# Patient Record
Sex: FEMALE | Race: BLACK OR AFRICAN AMERICAN | Employment: FULL TIME | URBAN - METROPOLITAN AREA
[De-identification: names, ages, dates, MRNs, and addresses within clinical notes are randomized per-mention and may not be internally consistent; named-entity substitution may affect disease eponyms.]

---

## 2017-01-19 ENCOUNTER — OFFICE VISIT (OUTPATIENT)
Dept: INTERNAL MEDICINE CLINIC | Age: 52
End: 2017-01-19

## 2017-01-19 VITALS
DIASTOLIC BLOOD PRESSURE: 78 MMHG | SYSTOLIC BLOOD PRESSURE: 126 MMHG | WEIGHT: 280 LBS | RESPIRATION RATE: 14 BRPM | HEART RATE: 80 BPM | BODY MASS INDEX: 51.21 KG/M2

## 2017-01-19 DIAGNOSIS — G47.33 OSA (OBSTRUCTIVE SLEEP APNEA): ICD-10-CM

## 2017-01-19 DIAGNOSIS — R80.9 TYPE 2 DIABETES MELLITUS WITH MICROALBUMINURIA, WITHOUT LONG-TERM CURRENT USE OF INSULIN (HCC): ICD-10-CM

## 2017-01-19 DIAGNOSIS — E78.5 HYPERLIPIDEMIA, UNSPECIFIED HYPERLIPIDEMIA TYPE: ICD-10-CM

## 2017-01-19 DIAGNOSIS — I10 ESSENTIAL HYPERTENSION, BENIGN: ICD-10-CM

## 2017-01-19 DIAGNOSIS — J45.20 MILD INTERMITTENT ASTHMA WITHOUT COMPLICATION: ICD-10-CM

## 2017-01-19 DIAGNOSIS — E11.29 TYPE 2 DIABETES MELLITUS WITH MICROALBUMINURIA, WITHOUT LONG-TERM CURRENT USE OF INSULIN (HCC): ICD-10-CM

## 2017-01-19 DIAGNOSIS — E66.01 MORBID OBESITY WITH BMI OF 50.0-59.9, ADULT (HCC): ICD-10-CM

## 2017-01-19 DIAGNOSIS — E11.29 TYPE 2 DIABETES MELLITUS WITH MICROALBUMINURIA, WITHOUT LONG-TERM CURRENT USE OF INSULIN (HCC): Primary | ICD-10-CM

## 2017-01-19 DIAGNOSIS — Z12.11 COLON CANCER SCREENING: ICD-10-CM

## 2017-01-19 DIAGNOSIS — J00 NASOPHARYNGITIS: ICD-10-CM

## 2017-01-19 DIAGNOSIS — R80.9 TYPE 2 DIABETES MELLITUS WITH MICROALBUMINURIA, WITHOUT LONG-TERM CURRENT USE OF INSULIN (HCC): Primary | ICD-10-CM

## 2017-01-19 LAB
A/G RATIO: 1.2 (ref 1.1–2.2)
ALBUMIN SERPL-MCNC: 4.6 G/DL (ref 3.4–5)
ALP BLD-CCNC: 68 U/L (ref 40–129)
ALT SERPL-CCNC: 12 U/L (ref 10–40)
ANION GAP SERPL CALCULATED.3IONS-SCNC: 17 MMOL/L (ref 3–16)
AST SERPL-CCNC: 12 U/L (ref 15–37)
BASOPHILS ABSOLUTE: 0 K/UL (ref 0–0.2)
BASOPHILS RELATIVE PERCENT: 0.4 %
BILIRUB SERPL-MCNC: <0.2 MG/DL (ref 0–1)
BUN BLDV-MCNC: 12 MG/DL (ref 7–20)
CALCIUM SERPL-MCNC: 9.7 MG/DL (ref 8.3–10.6)
CHLORIDE BLD-SCNC: 96 MMOL/L (ref 99–110)
CO2: 26 MMOL/L (ref 21–32)
CREAT SERPL-MCNC: 0.8 MG/DL (ref 0.6–1.1)
EOSINOPHILS ABSOLUTE: 0.1 K/UL (ref 0–0.6)
EOSINOPHILS RELATIVE PERCENT: 0.9 %
GFR AFRICAN AMERICAN: >60
GFR NON-AFRICAN AMERICAN: >60
GLOBULIN: 3.8 G/DL
GLUCOSE BLD-MCNC: 130 MG/DL (ref 70–99)
HCT VFR BLD CALC: 32.7 % (ref 36–48)
HEMOGLOBIN: 10.9 G/DL (ref 12–16)
LYMPHOCYTES ABSOLUTE: 2.7 K/UL (ref 1–5.1)
LYMPHOCYTES RELATIVE PERCENT: 29.8 %
MCH RBC QN AUTO: 26.1 PG (ref 26–34)
MCHC RBC AUTO-ENTMCNC: 33.3 G/DL (ref 31–36)
MCV RBC AUTO: 78.2 FL (ref 80–100)
MONOCYTES ABSOLUTE: 0.4 K/UL (ref 0–1.3)
MONOCYTES RELATIVE PERCENT: 4.5 %
NEUTROPHILS ABSOLUTE: 5.7 K/UL (ref 1.7–7.7)
NEUTROPHILS RELATIVE PERCENT: 64.4 %
PDW BLD-RTO: 15.9 % (ref 12.4–15.4)
PLATELET # BLD: 389 K/UL (ref 135–450)
PMV BLD AUTO: 8.7 FL (ref 5–10.5)
POTASSIUM SERPL-SCNC: 4.5 MMOL/L (ref 3.5–5.1)
RBC # BLD: 4.18 M/UL (ref 4–5.2)
SODIUM BLD-SCNC: 139 MMOL/L (ref 136–145)
TOTAL PROTEIN: 8.4 G/DL (ref 6.4–8.2)
WBC # BLD: 8.9 K/UL (ref 4–11)

## 2017-01-19 PROCEDURE — 99214 OFFICE O/P EST MOD 30 MIN: CPT | Performed by: INTERNAL MEDICINE

## 2017-01-19 RX ORDER — FLUTICASONE PROPIONATE 50 MCG
1 SPRAY, SUSPENSION (ML) NASAL DAILY
Qty: 1 BOTTLE | Refills: 3 | Status: SHIPPED | OUTPATIENT
Start: 2017-01-19 | End: 2017-09-21 | Stop reason: SDUPTHER

## 2017-01-19 RX ORDER — AMLODIPINE BESYLATE 10 MG/1
TABLET ORAL
Qty: 30 TABLET | Refills: 5 | Status: SHIPPED | OUTPATIENT
Start: 2017-01-19 | End: 2017-02-22

## 2017-01-19 RX ORDER — FEXOFENADINE HCL 180 MG/1
180 TABLET ORAL DAILY
Qty: 30 TABLET | Refills: 5 | Status: SHIPPED | OUTPATIENT
Start: 2017-01-19 | End: 2017-09-21 | Stop reason: SDUPTHER

## 2017-01-19 RX ORDER — SIMVASTATIN 20 MG
20 TABLET ORAL DAILY
COMMUNITY
Start: 2017-01-16 | End: 2017-02-22

## 2017-01-19 RX ORDER — VALSARTAN AND HYDROCHLOROTHIAZIDE 320; 25 MG/1; MG/1
1 TABLET, FILM COATED ORAL DAILY
Qty: 30 TABLET | Refills: 5 | Status: SHIPPED | OUTPATIENT
Start: 2017-01-19 | End: 2017-09-21 | Stop reason: SDUPTHER

## 2017-01-20 LAB
ESTIMATED AVERAGE GLUCOSE: 159.9 MG/DL
HBA1C MFR BLD: 7.2 %

## 2017-01-26 ENCOUNTER — TELEPHONE (OUTPATIENT)
Dept: BARIATRICS/WEIGHT MGMT | Age: 52
End: 2017-01-26

## 2017-02-06 ENCOUNTER — OFFICE VISIT (OUTPATIENT)
Dept: INTERNAL MEDICINE CLINIC | Age: 52
End: 2017-02-06

## 2017-02-06 VITALS
BODY MASS INDEX: 51.58 KG/M2 | TEMPERATURE: 98.2 F | WEIGHT: 282 LBS | DIASTOLIC BLOOD PRESSURE: 84 MMHG | SYSTOLIC BLOOD PRESSURE: 136 MMHG

## 2017-02-06 DIAGNOSIS — R80.9 TYPE 2 DIABETES MELLITUS WITH MICROALBUMINURIA, WITHOUT LONG-TERM CURRENT USE OF INSULIN (HCC): ICD-10-CM

## 2017-02-06 DIAGNOSIS — E11.29 TYPE 2 DIABETES MELLITUS WITH MICROALBUMINURIA, WITHOUT LONG-TERM CURRENT USE OF INSULIN (HCC): ICD-10-CM

## 2017-02-06 DIAGNOSIS — J02.9 EXUDATIVE PHARYNGITIS: Primary | ICD-10-CM

## 2017-02-06 DIAGNOSIS — J02.9 EXUDATIVE PHARYNGITIS: ICD-10-CM

## 2017-02-06 LAB
BASOPHILS ABSOLUTE: 0.1 K/UL (ref 0–0.2)
BASOPHILS RELATIVE PERCENT: 1 %
EOSINOPHILS ABSOLUTE: 0.1 K/UL (ref 0–0.6)
EOSINOPHILS RELATIVE PERCENT: 2.2 %
HCT VFR BLD CALC: 30.7 % (ref 36–48)
HEMOGLOBIN: 9.9 G/DL (ref 12–16)
LYMPHOCYTES ABSOLUTE: 2.4 K/UL (ref 1–5.1)
LYMPHOCYTES RELATIVE PERCENT: 36.9 %
MCH RBC QN AUTO: 24.7 PG (ref 26–34)
MCHC RBC AUTO-ENTMCNC: 32.2 G/DL (ref 31–36)
MCV RBC AUTO: 76.7 FL (ref 80–100)
MONOCYTES ABSOLUTE: 0.4 K/UL (ref 0–1.3)
MONOCYTES RELATIVE PERCENT: 6.8 %
NEUTROPHILS ABSOLUTE: 3.5 K/UL (ref 1.7–7.7)
NEUTROPHILS RELATIVE PERCENT: 53.1 %
PDW BLD-RTO: 15.6 % (ref 12.4–15.4)
PLATELET # BLD: 334 K/UL (ref 135–450)
PMV BLD AUTO: 8.7 FL (ref 5–10.5)
RBC # BLD: 4 M/UL (ref 4–5.2)
WBC # BLD: 6.5 K/UL (ref 4–11)

## 2017-02-06 PROCEDURE — 99213 OFFICE O/P EST LOW 20 MIN: CPT | Performed by: INTERNAL MEDICINE

## 2017-02-06 PROCEDURE — 87880 STREP A ASSAY W/OPTIC: CPT | Performed by: INTERNAL MEDICINE

## 2017-02-06 RX ORDER — PENICILLIN V POTASSIUM 500 MG/1
500 TABLET ORAL 2 TIMES DAILY
Qty: 20 TABLET | Refills: 0 | Status: SHIPPED | OUTPATIENT
Start: 2017-02-06 | End: 2017-02-16

## 2017-02-07 LAB
ANION GAP SERPL CALCULATED.3IONS-SCNC: 15 MMOL/L (ref 3–16)
BUN BLDV-MCNC: 10 MG/DL (ref 7–20)
CALCIUM SERPL-MCNC: 9.2 MG/DL (ref 8.3–10.6)
CHLORIDE BLD-SCNC: 100 MMOL/L (ref 99–110)
CO2: 26 MMOL/L (ref 21–32)
CREAT SERPL-MCNC: 0.7 MG/DL (ref 0.6–1.1)
GFR AFRICAN AMERICAN: >60
GFR NON-AFRICAN AMERICAN: >60
GLUCOSE BLD-MCNC: 119 MG/DL (ref 70–99)
POTASSIUM SERPL-SCNC: 4.2 MMOL/L (ref 3.5–5.1)
S PYO AG THROAT QL: NORMAL
SODIUM BLD-SCNC: 141 MMOL/L (ref 136–145)

## 2017-02-08 LAB
ORGANISM: ABNORMAL
THROAT CULTURE: ABNORMAL
THROAT CULTURE: ABNORMAL

## 2017-02-21 DIAGNOSIS — E78.5 HYPERLIPIDEMIA: ICD-10-CM

## 2017-02-21 DIAGNOSIS — E11.9 TYPE 2 DIABETES MELLITUS WITHOUT COMPLICATION (HCC): ICD-10-CM

## 2017-02-21 DIAGNOSIS — I10 ESSENTIAL HYPERTENSION, BENIGN: ICD-10-CM

## 2017-02-22 RX ORDER — ASPIRIN 81 MG/1
TABLET, CHEWABLE ORAL
Qty: 30 TABLET | Refills: 10 | Status: SHIPPED | OUTPATIENT
Start: 2017-02-22 | End: 2018-03-06 | Stop reason: SDUPTHER

## 2017-02-22 RX ORDER — AMLODIPINE BESYLATE 10 MG/1
TABLET ORAL
Qty: 30 TABLET | Refills: 4 | Status: SHIPPED | OUTPATIENT
Start: 2017-02-22 | End: 2017-09-21 | Stop reason: SDUPTHER

## 2017-02-22 RX ORDER — SIMVASTATIN 20 MG
TABLET ORAL
Qty: 30 TABLET | Refills: 10 | Status: SHIPPED | OUTPATIENT
Start: 2017-02-22 | End: 2018-03-06 | Stop reason: SDUPTHER

## 2017-03-02 ENCOUNTER — OFFICE VISIT (OUTPATIENT)
Dept: GYNECOLOGY | Age: 52
End: 2017-03-02

## 2017-03-02 VITALS
TEMPERATURE: 96.1 F | SYSTOLIC BLOOD PRESSURE: 166 MMHG | HEIGHT: 62 IN | RESPIRATION RATE: 17 BRPM | HEART RATE: 62 BPM | WEIGHT: 287 LBS | DIASTOLIC BLOOD PRESSURE: 103 MMHG | BODY MASS INDEX: 52.81 KG/M2

## 2017-03-02 DIAGNOSIS — D25.1 INTRAMURAL LEIOMYOMA OF UTERUS: Primary | ICD-10-CM

## 2017-03-02 DIAGNOSIS — D50.0 IRON DEFICIENCY ANEMIA DUE TO CHRONIC BLOOD LOSS: ICD-10-CM

## 2017-03-02 PROCEDURE — 99214 OFFICE O/P EST MOD 30 MIN: CPT | Performed by: OBSTETRICS & GYNECOLOGY

## 2017-03-05 ASSESSMENT — ENCOUNTER SYMPTOMS
EYES NEGATIVE: 1
GASTROINTESTINAL NEGATIVE: 1
RESPIRATORY NEGATIVE: 1

## 2017-03-09 ENCOUNTER — HOSPITAL ENCOUNTER (OUTPATIENT)
Dept: ULTRASOUND IMAGING | Age: 52
Discharge: HOME | End: 2017-03-09
Attending: OBSTETRICS & GYNECOLOGY | Admitting: OBSTETRICS & GYNECOLOGY

## 2017-03-09 DIAGNOSIS — D25.1 INTRAMURAL LEIOMYOMA OF UTERUS: ICD-10-CM

## 2017-03-09 DIAGNOSIS — D50.0 IRON DEFICIENCY ANEMIA DUE TO CHRONIC BLOOD LOSS: ICD-10-CM

## 2017-03-16 ENCOUNTER — TELEPHONE (OUTPATIENT)
Dept: GYNECOLOGY | Age: 52
End: 2017-03-16

## 2017-04-05 PROBLEM — K63.5 COLON POLYPS: Status: ACTIVE | Noted: 2017-04-05

## 2017-04-12 ENCOUNTER — OFFICE VISIT (OUTPATIENT)
Dept: PULMONOLOGY | Age: 52
End: 2017-04-12

## 2017-04-12 VITALS
HEART RATE: 86 BPM | WEIGHT: 282 LBS | BODY MASS INDEX: 51.89 KG/M2 | TEMPERATURE: 98.2 F | SYSTOLIC BLOOD PRESSURE: 138 MMHG | OXYGEN SATURATION: 97 % | RESPIRATION RATE: 16 BRPM | DIASTOLIC BLOOD PRESSURE: 84 MMHG | HEIGHT: 62 IN

## 2017-04-12 DIAGNOSIS — G47.33 OSA TREATED WITH BIPAP: Primary | ICD-10-CM

## 2017-04-12 PROCEDURE — 99213 OFFICE O/P EST LOW 20 MIN: CPT | Performed by: INTERNAL MEDICINE

## 2017-04-12 RX ORDER — M-VIT,TX,IRON,MINS/CALC/FOLIC 27MG-0.4MG
1 TABLET ORAL DAILY
Status: ON HOLD | COMMUNITY
End: 2020-11-06 | Stop reason: CLARIF

## 2017-04-12 ASSESSMENT — SLEEP AND FATIGUE QUESTIONNAIRES
HOW LIKELY ARE YOU TO NOD OFF OR FALL ASLEEP WHILE WATCHING TV: 1
ESS TOTAL SCORE: 8
HOW LIKELY ARE YOU TO NOD OFF OR FALL ASLEEP WHILE SITTING AND READING: 1
HOW LIKELY ARE YOU TO NOD OFF OR FALL ASLEEP WHILE SITTING AND TALKING TO SOMEONE: 1
HOW LIKELY ARE YOU TO NOD OFF OR FALL ASLEEP IN A CAR, WHILE STOPPED FOR A FEW MINUTES IN TRAFFIC: 1
HOW LIKELY ARE YOU TO NOD OFF OR FALL ASLEEP WHEN YOU ARE A PASSENGER IN A CAR FOR AN HOUR WITHOUT A BREAK: 1
HOW LIKELY ARE YOU TO NOD OFF OR FALL ASLEEP WHILE SITTING INACTIVE IN A PUBLIC PLACE: 1
HOW LIKELY ARE YOU TO NOD OFF OR FALL ASLEEP WHILE LYING DOWN TO REST IN THE AFTERNOON WHEN CIRCUMSTANCES PERMIT: 1
HOW LIKELY ARE YOU TO NOD OFF OR FALL ASLEEP WHILE SITTING QUIETLY AFTER LUNCH WITHOUT ALCOHOL: 1

## 2017-04-19 ENCOUNTER — OFFICE VISIT (OUTPATIENT)
Dept: INTERNAL MEDICINE CLINIC | Age: 52
End: 2017-04-19

## 2017-04-19 VITALS
HEART RATE: 63 BPM | OXYGEN SATURATION: 95 % | SYSTOLIC BLOOD PRESSURE: 126 MMHG | BODY MASS INDEX: 51.94 KG/M2 | WEIGHT: 284 LBS | DIASTOLIC BLOOD PRESSURE: 70 MMHG

## 2017-04-19 DIAGNOSIS — E78.5 HYPERLIPIDEMIA, UNSPECIFIED HYPERLIPIDEMIA TYPE: ICD-10-CM

## 2017-04-19 DIAGNOSIS — R80.9 TYPE 2 DIABETES MELLITUS WITH MICROALBUMINURIA, WITHOUT LONG-TERM CURRENT USE OF INSULIN (HCC): Primary | ICD-10-CM

## 2017-04-19 DIAGNOSIS — I10 ESSENTIAL HYPERTENSION, BENIGN: ICD-10-CM

## 2017-04-19 DIAGNOSIS — D50.0 IRON DEFICIENCY ANEMIA DUE TO CHRONIC BLOOD LOSS: ICD-10-CM

## 2017-04-19 DIAGNOSIS — E11.29 TYPE 2 DIABETES MELLITUS WITH MICROALBUMINURIA, WITHOUT LONG-TERM CURRENT USE OF INSULIN (HCC): Primary | ICD-10-CM

## 2017-04-19 LAB — HBA1C MFR BLD: 7.2 %

## 2017-04-19 PROCEDURE — 83036 HEMOGLOBIN GLYCOSYLATED A1C: CPT | Performed by: INTERNAL MEDICINE

## 2017-04-19 PROCEDURE — 99214 OFFICE O/P EST MOD 30 MIN: CPT | Performed by: INTERNAL MEDICINE

## 2017-04-26 ENCOUNTER — TELEPHONE (OUTPATIENT)
Dept: GYNECOLOGY | Age: 52
End: 2017-04-26

## 2017-05-10 DIAGNOSIS — E11.9 TYPE 2 DIABETES MELLITUS WITHOUT COMPLICATION, UNSPECIFIED LONG TERM INSULIN USE STATUS: ICD-10-CM

## 2017-05-10 DIAGNOSIS — E87.6 HYPOKALEMIA: ICD-10-CM

## 2017-05-10 RX ORDER — POTASSIUM CHLORIDE 1500 MG/1
TABLET, EXTENDED RELEASE ORAL
Qty: 30 TABLET | Refills: 0 | Status: SHIPPED | OUTPATIENT
Start: 2017-05-10 | End: 2017-06-05 | Stop reason: SDUPTHER

## 2017-05-10 RX ORDER — GLIMEPIRIDE 4 MG/1
TABLET ORAL
Qty: 30 TABLET | Refills: 0 | Status: SHIPPED | OUTPATIENT
Start: 2017-05-10 | End: 2017-06-11 | Stop reason: SDUPTHER

## 2017-05-30 ENCOUNTER — TELEPHONE (OUTPATIENT)
Dept: GYNECOLOGY | Age: 52
End: 2017-05-30

## 2017-06-01 ENCOUNTER — OFFICE VISIT (OUTPATIENT)
Dept: INTERNAL MEDICINE CLINIC | Age: 52
End: 2017-06-01

## 2017-06-01 VITALS
SYSTOLIC BLOOD PRESSURE: 138 MMHG | BODY MASS INDEX: 51.58 KG/M2 | WEIGHT: 282 LBS | RESPIRATION RATE: 14 BRPM | DIASTOLIC BLOOD PRESSURE: 74 MMHG | HEART RATE: 60 BPM

## 2017-06-01 DIAGNOSIS — I10 ESSENTIAL HYPERTENSION, BENIGN: ICD-10-CM

## 2017-06-01 DIAGNOSIS — G47.33 OSA (OBSTRUCTIVE SLEEP APNEA): ICD-10-CM

## 2017-06-01 DIAGNOSIS — E11.29 TYPE 2 DIABETES MELLITUS WITH MICROALBUMINURIA, WITHOUT LONG-TERM CURRENT USE OF INSULIN (HCC): ICD-10-CM

## 2017-06-01 DIAGNOSIS — R06.09 DOE (DYSPNEA ON EXERTION): ICD-10-CM

## 2017-06-01 DIAGNOSIS — D50.0 IRON DEFICIENCY ANEMIA DUE TO CHRONIC BLOOD LOSS: ICD-10-CM

## 2017-06-01 DIAGNOSIS — Z01.818 PREOP GENERAL PHYSICAL EXAM: Primary | ICD-10-CM

## 2017-06-01 DIAGNOSIS — E66.01 MORBID OBESITY WITH BMI OF 50.0-59.9, ADULT (HCC): ICD-10-CM

## 2017-06-01 DIAGNOSIS — D25.1 FIBROIDS, INTRAMURAL: ICD-10-CM

## 2017-06-01 DIAGNOSIS — R80.9 TYPE 2 DIABETES MELLITUS WITH MICROALBUMINURIA, WITHOUT LONG-TERM CURRENT USE OF INSULIN (HCC): ICD-10-CM

## 2017-06-01 DIAGNOSIS — J98.4 RESTRICTIVE LUNG DISEASE: ICD-10-CM

## 2017-06-01 DIAGNOSIS — E78.5 HYPERLIPIDEMIA, UNSPECIFIED HYPERLIPIDEMIA TYPE: ICD-10-CM

## 2017-06-01 PROCEDURE — 93000 ELECTROCARDIOGRAM COMPLETE: CPT | Performed by: INTERNAL MEDICINE

## 2017-06-01 PROCEDURE — 99244 OFF/OP CNSLTJ NEW/EST MOD 40: CPT | Performed by: INTERNAL MEDICINE

## 2017-06-02 LAB
A/G RATIO: 1.2 (ref 1.1–2.2)
ALBUMIN SERPL-MCNC: 4.3 G/DL (ref 3.4–5)
ALP BLD-CCNC: 60 U/L (ref 40–129)
ALT SERPL-CCNC: 14 U/L (ref 10–40)
ANION GAP SERPL CALCULATED.3IONS-SCNC: 14 MMOL/L (ref 3–16)
AST SERPL-CCNC: 14 U/L (ref 15–37)
BASOPHILS ABSOLUTE: 0 K/UL (ref 0–0.2)
BASOPHILS RELATIVE PERCENT: 0.6 %
BILIRUB SERPL-MCNC: <0.2 MG/DL (ref 0–1)
BUN BLDV-MCNC: 11 MG/DL (ref 7–20)
CALCIUM SERPL-MCNC: 9 MG/DL (ref 8.3–10.6)
CHLORIDE BLD-SCNC: 99 MMOL/L (ref 99–110)
CHOLESTEROL, TOTAL: 188 MG/DL (ref 0–199)
CO2: 26 MMOL/L (ref 21–32)
CREAT SERPL-MCNC: 0.7 MG/DL (ref 0.6–1.1)
EOSINOPHILS ABSOLUTE: 0.1 K/UL (ref 0–0.6)
EOSINOPHILS RELATIVE PERCENT: 1.4 %
ESTIMATED AVERAGE GLUCOSE: 171.4 MG/DL
FOLATE: 13.6 NG/ML (ref 4.78–24.2)
GFR AFRICAN AMERICAN: >60
GFR NON-AFRICAN AMERICAN: >60
GLOBULIN: 3.7 G/DL
GLUCOSE BLD-MCNC: 144 MG/DL (ref 70–99)
HBA1C MFR BLD: 7.6 %
HCT VFR BLD CALC: 34.3 % (ref 36–48)
HDLC SERPL-MCNC: 45 MG/DL (ref 40–60)
HEMOGLOBIN: 11 G/DL (ref 12–16)
LDL CHOLESTEROL CALCULATED: 126 MG/DL
LYMPHOCYTES ABSOLUTE: 2.3 K/UL (ref 1–5.1)
LYMPHOCYTES RELATIVE PERCENT: 34.3 %
MCH RBC QN AUTO: 25 PG (ref 26–34)
MCHC RBC AUTO-ENTMCNC: 32.2 G/DL (ref 31–36)
MCV RBC AUTO: 77.6 FL (ref 80–100)
MONOCYTES ABSOLUTE: 0.3 K/UL (ref 0–1.3)
MONOCYTES RELATIVE PERCENT: 5.2 %
NEUTROPHILS ABSOLUTE: 3.9 K/UL (ref 1.7–7.7)
NEUTROPHILS RELATIVE PERCENT: 58.5 %
PDW BLD-RTO: 17.4 % (ref 12.4–15.4)
PLATELET # BLD: 267 K/UL (ref 135–450)
PMV BLD AUTO: 8.8 FL (ref 5–10.5)
POTASSIUM SERPL-SCNC: 4 MMOL/L (ref 3.5–5.1)
RBC # BLD: 4.41 M/UL (ref 4–5.2)
SODIUM BLD-SCNC: 139 MMOL/L (ref 136–145)
TOTAL PROTEIN: 8 G/DL (ref 6.4–8.2)
TRIGL SERPL-MCNC: 87 MG/DL (ref 0–150)
VITAMIN B-12: 304 PG/ML (ref 211–911)
VLDLC SERPL CALC-MCNC: 17 MG/DL
WBC # BLD: 6.6 K/UL (ref 4–11)

## 2017-06-05 ENCOUNTER — TELEPHONE (OUTPATIENT)
Dept: INTERNAL MEDICINE CLINIC | Age: 52
End: 2017-06-05

## 2017-06-05 DIAGNOSIS — E87.6 HYPOKALEMIA: ICD-10-CM

## 2017-06-05 RX ORDER — POTASSIUM CHLORIDE 1500 MG/1
TABLET, EXTENDED RELEASE ORAL
Qty: 30 TABLET | Refills: 0 | Status: SHIPPED | OUTPATIENT
Start: 2017-06-05 | End: 2017-07-04 | Stop reason: SDUPTHER

## 2017-06-07 ENCOUNTER — HOSPITAL ENCOUNTER (OUTPATIENT)
Dept: NON INVASIVE DIAGNOSTICS | Age: 52
Discharge: HOME | End: 2017-06-07
Attending: INTERNAL MEDICINE | Admitting: INTERNAL MEDICINE

## 2017-06-07 DIAGNOSIS — Z01.818 ENCOUNTER FOR OTHER PREPROCEDURAL EXAMINATION: ICD-10-CM

## 2017-06-07 DIAGNOSIS — Z01.818 PREOP GENERAL PHYSICAL EXAM: ICD-10-CM

## 2017-06-07 DIAGNOSIS — R06.09 DOE (DYSPNEA ON EXERTION): ICD-10-CM

## 2017-06-07 DIAGNOSIS — I10 ESSENTIAL HYPERTENSION: ICD-10-CM

## 2017-06-07 LAB
LV EF: 68 %
LVEF MODALITY: NORMAL

## 2017-06-08 ENCOUNTER — OFFICE VISIT (OUTPATIENT)
Dept: CARDIOLOGY CLINIC | Age: 52
End: 2017-06-08

## 2017-06-08 ENCOUNTER — TELEPHONE (OUTPATIENT)
Dept: CARDIOLOGY CLINIC | Age: 52
End: 2017-06-08

## 2017-06-08 ENCOUNTER — HOSPITAL ENCOUNTER (OUTPATIENT)
Dept: PREADMISSION TESTING | Age: 52
Discharge: HOME | End: 2017-06-08
Attending: OBSTETRICS & GYNECOLOGY | Admitting: OBSTETRICS & GYNECOLOGY

## 2017-06-08 VITALS — WEIGHT: 280 LBS | HEIGHT: 63 IN | BODY MASS INDEX: 49.61 KG/M2

## 2017-06-08 VITALS
WEIGHT: 282.6 LBS | DIASTOLIC BLOOD PRESSURE: 70 MMHG | BODY MASS INDEX: 50.07 KG/M2 | HEIGHT: 63 IN | HEART RATE: 64 BPM | SYSTOLIC BLOOD PRESSURE: 110 MMHG

## 2017-06-08 DIAGNOSIS — I10 ESSENTIAL HYPERTENSION, BENIGN: ICD-10-CM

## 2017-06-08 DIAGNOSIS — R80.9 TYPE 2 DIABETES MELLITUS WITH MICROALBUMINURIA, WITHOUT LONG-TERM CURRENT USE OF INSULIN (HCC): ICD-10-CM

## 2017-06-08 DIAGNOSIS — E66.01 MORBID OBESITY WITH BMI OF 50.0-59.9, ADULT (HCC): ICD-10-CM

## 2017-06-08 DIAGNOSIS — E11.29 TYPE 2 DIABETES MELLITUS WITH MICROALBUMINURIA, WITHOUT LONG-TERM CURRENT USE OF INSULIN (HCC): ICD-10-CM

## 2017-06-08 DIAGNOSIS — E78.5 HYPERLIPIDEMIA, UNSPECIFIED HYPERLIPIDEMIA TYPE: ICD-10-CM

## 2017-06-08 DIAGNOSIS — D25.1 FIBROIDS, INTRAMURAL: ICD-10-CM

## 2017-06-08 DIAGNOSIS — I10 ESSENTIAL HYPERTENSION, BENIGN: Primary | ICD-10-CM

## 2017-06-08 LAB
A/G RATIO: 1.1 (ref 1.1–2.2)
ALBUMIN SERPL-MCNC: 4.2 G/DL (ref 3.4–5)
ALP BLD-CCNC: 69 U/L (ref 40–129)
ALT SERPL-CCNC: 14 U/L (ref 10–40)
ANION GAP SERPL CALCULATED.3IONS-SCNC: 19 MMOL/L (ref 3–16)
AST SERPL-CCNC: 12 U/L (ref 15–37)
BASOPHILS ABSOLUTE: 0 K/UL (ref 0–0.2)
BASOPHILS RELATIVE PERCENT: 0.3 %
BILIRUB SERPL-MCNC: 0.3 MG/DL (ref 0–1)
BUN BLDV-MCNC: 15 MG/DL (ref 7–20)
CALCIUM SERPL-MCNC: 9.4 MG/DL (ref 8.3–10.6)
CHLORIDE BLD-SCNC: 100 MMOL/L (ref 99–110)
CO2: 25 MMOL/L (ref 21–32)
CREAT SERPL-MCNC: 0.7 MG/DL (ref 0.6–1.1)
EOSINOPHILS ABSOLUTE: 0.1 K/UL (ref 0–0.6)
EOSINOPHILS RELATIVE PERCENT: 0.8 %
GFR AFRICAN AMERICAN: >60
GFR NON-AFRICAN AMERICAN: >60
GLOBULIN: 3.8 G/DL
GLUCOSE BLD-MCNC: 102 MG/DL (ref 70–99)
HCT VFR BLD CALC: 35.2 % (ref 36–48)
HEMOGLOBIN: 11.6 G/DL (ref 12–16)
INR BLD: 1.11 (ref 0.85–1.15)
LYMPHOCYTES ABSOLUTE: 2.6 K/UL (ref 1–5.1)
LYMPHOCYTES RELATIVE PERCENT: 35 %
MCH RBC QN AUTO: 25.4 PG (ref 26–34)
MCHC RBC AUTO-ENTMCNC: 33 G/DL (ref 31–36)
MCV RBC AUTO: 76.9 FL (ref 80–100)
MONOCYTES ABSOLUTE: 0.4 K/UL (ref 0–1.3)
MONOCYTES RELATIVE PERCENT: 5.2 %
NEUTROPHILS ABSOLUTE: 4.4 K/UL (ref 1.7–7.7)
NEUTROPHILS RELATIVE PERCENT: 58.7 %
PDW BLD-RTO: 17.9 % (ref 12.4–15.4)
PLATELET # BLD: 298 K/UL (ref 135–450)
PMV BLD AUTO: 8.8 FL (ref 5–10.5)
POTASSIUM SERPL-SCNC: 4 MMOL/L (ref 3.5–5.1)
PROTHROMBIN TIME: 12.5 SEC (ref 9.6–13)
RBC # BLD: 4.58 M/UL (ref 4–5.2)
SODIUM BLD-SCNC: 144 MMOL/L (ref 136–145)
TOTAL PROTEIN: 8 G/DL (ref 6.4–8.2)
WBC # BLD: 7.5 K/UL (ref 4–11)

## 2017-06-08 PROCEDURE — 99214 OFFICE O/P EST MOD 30 MIN: CPT | Performed by: INTERNAL MEDICINE

## 2017-06-08 PROCEDURE — 93000 ELECTROCARDIOGRAM COMPLETE: CPT | Performed by: INTERNAL MEDICINE

## 2017-06-08 RX ORDER — LIDOCAINE HYDROCHLORIDE 10 MG/ML
1 INJECTION, SOLUTION EPIDURAL; INFILTRATION; INTRACAUDAL; PERINEURAL
Status: CANCELLED | OUTPATIENT
Start: 2017-06-08 | End: 2017-06-08

## 2017-06-08 RX ORDER — CHLORHEXIDINE GLUCONATE 0.12 MG/ML
15 RINSE ORAL 2 TIMES DAILY
Status: CANCELLED | OUTPATIENT
Start: 2017-06-08

## 2017-06-08 RX ORDER — SODIUM CHLORIDE 0.9 % (FLUSH) 0.9 %
10 SYRINGE (ML) INJECTION PRN
Status: CANCELLED | OUTPATIENT
Start: 2017-06-08

## 2017-06-08 RX ORDER — SODIUM CHLORIDE, SODIUM LACTATE, POTASSIUM CHLORIDE, CALCIUM CHLORIDE 600; 310; 30; 20 MG/100ML; MG/100ML; MG/100ML; MG/100ML
INJECTION, SOLUTION INTRAVENOUS CONTINUOUS
Status: CANCELLED | OUTPATIENT
Start: 2017-06-08

## 2017-06-08 RX ORDER — SODIUM CHLORIDE 0.9 % (FLUSH) 0.9 %
10 SYRINGE (ML) INJECTION EVERY 12 HOURS SCHEDULED
Status: CANCELLED | OUTPATIENT
Start: 2017-06-08

## 2017-06-08 RX ORDER — HEPARIN SODIUM 5000 [USP'U]/ML
5000 INJECTION, SOLUTION INTRAVENOUS; SUBCUTANEOUS ONCE
Status: CANCELLED | OUTPATIENT
Start: 2017-06-08 | End: 2017-06-08

## 2017-06-08 RX ORDER — LANCETS 33 GAUGE
EACH MISCELLANEOUS
Qty: 100 EACH | Refills: 11 | Status: SHIPPED | OUTPATIENT
Start: 2017-06-08 | End: 2017-06-21 | Stop reason: SDUPTHER

## 2017-06-09 ENCOUNTER — HOSPITAL ENCOUNTER (OUTPATIENT)
Dept: NON INVASIVE DIAGNOSTICS | Age: 52
Discharge: HOME | End: 2017-06-09
Attending: INTERNAL MEDICINE | Admitting: INTERNAL MEDICINE

## 2017-06-09 ENCOUNTER — HOSPITAL ENCOUNTER (OUTPATIENT)
Dept: SURGERY | Age: 52
Discharge: HOME | End: 2017-06-09
Admitting: OBSTETRICS & GYNECOLOGY

## 2017-06-09 DIAGNOSIS — Z01.818 ENCOUNTER FOR OTHER PREPROCEDURAL EXAMINATION: ICD-10-CM

## 2017-06-09 LAB
LV EF: 63 %
LVEF MODALITY: NORMAL

## 2017-06-11 DIAGNOSIS — E11.9 TYPE 2 DIABETES MELLITUS WITHOUT COMPLICATION, UNSPECIFIED LONG TERM INSULIN USE STATUS: ICD-10-CM

## 2017-06-12 RX ORDER — GLIMEPIRIDE 4 MG/1
TABLET ORAL
Qty: 30 TABLET | Refills: 0 | Status: SHIPPED | OUTPATIENT
Start: 2017-06-12 | End: 2017-07-20 | Stop reason: SDUPTHER

## 2017-06-13 ENCOUNTER — HOSPITAL ENCOUNTER (OUTPATIENT)
Dept: CARDIAC CATH/INVASIVE PROCEDURES | Age: 52
Discharge: HOME | End: 2017-06-13
Attending: INTERNAL MEDICINE | Admitting: INTERNAL MEDICINE

## 2017-06-13 VITALS — HEIGHT: 63 IN | BODY MASS INDEX: 49.61 KG/M2 | WEIGHT: 280 LBS

## 2017-06-13 DIAGNOSIS — Z01.818 ENCOUNTER FOR OTHER PREPROCEDURAL EXAMINATION: ICD-10-CM

## 2017-06-13 DIAGNOSIS — E11.9 TYPE 2 DIABETES MELLITUS WITHOUT COMPLICATION (HCC): ICD-10-CM

## 2017-06-13 LAB
CHOLESTEROL, TOTAL: 144 MG/DL (ref 0–199)
HDLC SERPL-MCNC: 41 MG/DL (ref 40–60)
LDL CHOLESTEROL CALCULATED: 86 MG/DL
TRIGL SERPL-MCNC: 86 MG/DL (ref 0–150)
VLDLC SERPL CALC-MCNC: 17 MG/DL

## 2017-06-13 PROCEDURE — 93458 L HRT ARTERY/VENTRICLE ANGIO: CPT | Performed by: INTERNAL MEDICINE

## 2017-06-13 RX ORDER — ONDANSETRON 2 MG/ML
4 INJECTION INTRAMUSCULAR; INTRAVENOUS EVERY 6 HOURS PRN
Status: DISCONTINUED | OUTPATIENT
Start: 2017-06-13 | End: 2017-06-14 | Stop reason: HOSPADM

## 2017-06-13 RX ORDER — SODIUM CHLORIDE 9 MG/ML
INJECTION, SOLUTION INTRAVENOUS CONTINUOUS
Status: ACTIVE | OUTPATIENT
Start: 2017-06-13 | End: 2017-06-13

## 2017-06-13 RX ORDER — SODIUM CHLORIDE 0.9 % (FLUSH) 0.9 %
10 SYRINGE (ML) INJECTION EVERY 12 HOURS SCHEDULED
Status: DISCONTINUED | OUTPATIENT
Start: 2017-06-13 | End: 2017-06-14 | Stop reason: HOSPADM

## 2017-06-13 RX ORDER — ACETAMINOPHEN 325 MG/1
650 TABLET ORAL EVERY 4 HOURS PRN
Status: DISCONTINUED | OUTPATIENT
Start: 2017-06-13 | End: 2017-06-14 | Stop reason: HOSPADM

## 2017-06-13 RX ORDER — SODIUM CHLORIDE 0.9 % (FLUSH) 0.9 %
10 SYRINGE (ML) INJECTION PRN
Status: DISCONTINUED | OUTPATIENT
Start: 2017-06-13 | End: 2017-06-14 | Stop reason: HOSPADM

## 2017-06-13 ASSESSMENT — ENCOUNTER SYMPTOMS
EYES NEGATIVE: 1
ALLERGIC/IMMUNOLOGIC NEGATIVE: 1
GASTROINTESTINAL NEGATIVE: 1
SHORTNESS OF BREATH: 1

## 2017-06-21 ENCOUNTER — OFFICE VISIT (OUTPATIENT)
Dept: INTERNAL MEDICINE CLINIC | Age: 52
End: 2017-06-21

## 2017-06-21 ENCOUNTER — HOSPITAL ENCOUNTER (OUTPATIENT)
Dept: VASCULAR LAB | Age: 52
Discharge: HOME | End: 2017-06-21
Attending: INTERNAL MEDICINE | Admitting: INTERNAL MEDICINE

## 2017-06-21 VITALS
RESPIRATION RATE: 14 BRPM | BODY MASS INDEX: 51.19 KG/M2 | HEART RATE: 60 BPM | WEIGHT: 289 LBS | DIASTOLIC BLOOD PRESSURE: 82 MMHG | SYSTOLIC BLOOD PRESSURE: 152 MMHG

## 2017-06-21 DIAGNOSIS — M79.604 RIGHT LEG PAIN: ICD-10-CM

## 2017-06-21 DIAGNOSIS — R60.0 LOCALIZED EDEMA: ICD-10-CM

## 2017-06-21 DIAGNOSIS — I10 ESSENTIAL HYPERTENSION, BENIGN: Primary | ICD-10-CM

## 2017-06-21 DIAGNOSIS — R80.9 TYPE 2 DIABETES MELLITUS WITH MICROALBUMINURIA, WITHOUT LONG-TERM CURRENT USE OF INSULIN (HCC): ICD-10-CM

## 2017-06-21 DIAGNOSIS — E11.29 TYPE 2 DIABETES MELLITUS WITH MICROALBUMINURIA, WITHOUT LONG-TERM CURRENT USE OF INSULIN (HCC): ICD-10-CM

## 2017-06-21 DIAGNOSIS — R60.0 LEG EDEMA, RIGHT: ICD-10-CM

## 2017-06-21 DIAGNOSIS — E11.9 TYPE 2 DIABETES MELLITUS WITHOUT COMPLICATION, WITHOUT LONG-TERM CURRENT USE OF INSULIN (HCC): ICD-10-CM

## 2017-06-21 LAB
A/G RATIO: 1.1 (ref 1.1–2.2)
ALBUMIN SERPL-MCNC: 4.2 G/DL (ref 3.4–5)
ALP BLD-CCNC: 73 U/L (ref 40–129)
ALT SERPL-CCNC: 13 U/L (ref 10–40)
ANION GAP SERPL CALCULATED.3IONS-SCNC: 16 MMOL/L (ref 3–16)
AST SERPL-CCNC: 11 U/L (ref 15–37)
BASOPHILS ABSOLUTE: 0 K/UL (ref 0–0.2)
BASOPHILS RELATIVE PERCENT: 0.6 %
BILIRUB SERPL-MCNC: 0.3 MG/DL (ref 0–1)
BUN BLDV-MCNC: 11 MG/DL (ref 7–20)
CALCIUM SERPL-MCNC: 9.4 MG/DL (ref 8.3–10.6)
CHLORIDE BLD-SCNC: 97 MMOL/L (ref 99–110)
CO2: 25 MMOL/L (ref 21–32)
CREAT SERPL-MCNC: 0.7 MG/DL (ref 0.6–1.1)
CREATININE URINE: 65.9 MG/DL (ref 28–259)
EOSINOPHILS ABSOLUTE: 0.1 K/UL (ref 0–0.6)
EOSINOPHILS RELATIVE PERCENT: 1 %
GFR AFRICAN AMERICAN: >60
GFR NON-AFRICAN AMERICAN: >60
GLOBULIN: 3.8 G/DL
GLUCOSE BLD-MCNC: 138 MG/DL (ref 70–99)
HCT VFR BLD CALC: 35.2 % (ref 36–48)
HEMOGLOBIN: 11.2 G/DL (ref 12–16)
INR BLD: 1.07 (ref 0.85–1.15)
LYMPHOCYTES ABSOLUTE: 2.2 K/UL (ref 1–5.1)
LYMPHOCYTES RELATIVE PERCENT: 31.8 %
MCH RBC QN AUTO: 25.1 PG (ref 26–34)
MCHC RBC AUTO-ENTMCNC: 31.8 G/DL (ref 31–36)
MCV RBC AUTO: 79 FL (ref 80–100)
MICROALBUMIN UR-MCNC: 4.3 MG/DL
MICROALBUMIN/CREAT UR-RTO: 65.3 MG/G (ref 0–30)
MONOCYTES ABSOLUTE: 0.3 K/UL (ref 0–1.3)
MONOCYTES RELATIVE PERCENT: 5 %
NEUTROPHILS ABSOLUTE: 4.2 K/UL (ref 1.7–7.7)
NEUTROPHILS RELATIVE PERCENT: 61.6 %
PDW BLD-RTO: 17.9 % (ref 12.4–15.4)
PLATELET # BLD: 327 K/UL (ref 135–450)
PMV BLD AUTO: 8.9 FL (ref 5–10.5)
POTASSIUM SERPL-SCNC: 4.1 MMOL/L (ref 3.5–5.1)
PROTHROMBIN TIME: 12.1 SEC (ref 9.6–13)
RBC # BLD: 4.45 M/UL (ref 4–5.2)
SODIUM BLD-SCNC: 138 MMOL/L (ref 136–145)
TOTAL PROTEIN: 8 G/DL (ref 6.4–8.2)
WBC # BLD: 6.8 K/UL (ref 4–11)

## 2017-06-21 PROCEDURE — 99214 OFFICE O/P EST MOD 30 MIN: CPT | Performed by: INTERNAL MEDICINE

## 2017-06-21 RX ORDER — ATENOLOL 50 MG/1
50 TABLET ORAL DAILY
Qty: 30 TABLET | Refills: 5 | Status: SHIPPED | OUTPATIENT
Start: 2017-06-21 | End: 2017-09-21 | Stop reason: SDUPTHER

## 2017-06-21 RX ORDER — LANCETS 33 GAUGE
EACH MISCELLANEOUS
Qty: 100 EACH | Refills: 11 | Status: SHIPPED | OUTPATIENT
Start: 2017-06-21 | End: 2017-09-21 | Stop reason: SDUPTHER

## 2017-06-22 LAB
ESTIMATED AVERAGE GLUCOSE: 168.6 MG/DL
HBA1C MFR BLD: 7.5 %

## 2017-07-04 DIAGNOSIS — E87.6 HYPOKALEMIA: ICD-10-CM

## 2017-07-05 RX ORDER — POTASSIUM CHLORIDE 1500 MG/1
TABLET, EXTENDED RELEASE ORAL
Qty: 30 TABLET | Refills: 0 | Status: SHIPPED | OUTPATIENT
Start: 2017-07-05 | End: 2017-08-11 | Stop reason: SDUPTHER

## 2017-07-10 ENCOUNTER — OFFICE VISIT (OUTPATIENT)
Dept: PULMONOLOGY | Age: 52
End: 2017-07-10

## 2017-07-10 VITALS
OXYGEN SATURATION: 95 % | BODY MASS INDEX: 51.03 KG/M2 | DIASTOLIC BLOOD PRESSURE: 80 MMHG | RESPIRATION RATE: 16 BRPM | HEART RATE: 62 BPM | HEIGHT: 63 IN | WEIGHT: 288 LBS | TEMPERATURE: 98 F | SYSTOLIC BLOOD PRESSURE: 120 MMHG

## 2017-07-10 DIAGNOSIS — G47.33 OSA TREATED WITH BIPAP: Primary | ICD-10-CM

## 2017-07-10 PROCEDURE — 99213 OFFICE O/P EST LOW 20 MIN: CPT | Performed by: INTERNAL MEDICINE

## 2017-07-13 ENCOUNTER — TELEPHONE (OUTPATIENT)
Dept: INTERNAL MEDICINE CLINIC | Age: 52
End: 2017-07-13

## 2017-07-13 ENCOUNTER — OFFICE VISIT (OUTPATIENT)
Dept: INTERNAL MEDICINE CLINIC | Age: 52
End: 2017-07-13

## 2017-07-13 VITALS
OXYGEN SATURATION: 94 % | SYSTOLIC BLOOD PRESSURE: 151 MMHG | BODY MASS INDEX: 51.3 KG/M2 | HEART RATE: 59 BPM | DIASTOLIC BLOOD PRESSURE: 90 MMHG | WEIGHT: 289.6 LBS

## 2017-07-13 DIAGNOSIS — R22.43 LOCALIZED SWELLING OF BOTH LOWER LEGS: Primary | ICD-10-CM

## 2017-07-13 DIAGNOSIS — R22.43 LOCALIZED SWELLING OF BOTH LOWER LEGS: ICD-10-CM

## 2017-07-13 PROCEDURE — 99213 OFFICE O/P EST LOW 20 MIN: CPT | Performed by: NURSE PRACTITIONER

## 2017-07-20 ENCOUNTER — TELEPHONE (OUTPATIENT)
Dept: INTERNAL MEDICINE CLINIC | Age: 52
End: 2017-07-20

## 2017-07-20 DIAGNOSIS — E11.9 TYPE 2 DIABETES MELLITUS WITHOUT COMPLICATION, UNSPECIFIED LONG TERM INSULIN USE STATUS: ICD-10-CM

## 2017-07-20 RX ORDER — GLIMEPIRIDE 4 MG/1
TABLET ORAL
Qty: 30 TABLET | Refills: 0 | Status: SHIPPED | OUTPATIENT
Start: 2017-07-20 | End: 2017-09-21 | Stop reason: SDUPTHER

## 2017-08-02 LAB
CATARACTS: POSITIVE
DIABETIC RETINOPATHY: NEGATIVE
GLAUCOMA: NEGATIVE
INTRAOCULAR PRESSURE EYE: NORMAL
VISUAL ACUITY DISTANCE LEFT EYE: NORMAL
VISUAL ACUITY DISTANCE RIGHT EYE: NORMAL

## 2017-08-11 DIAGNOSIS — E87.6 HYPOKALEMIA: ICD-10-CM

## 2017-08-11 RX ORDER — POTASSIUM CHLORIDE 1500 MG/1
TABLET, EXTENDED RELEASE ORAL
Qty: 30 TABLET | Refills: 0 | Status: SHIPPED | OUTPATIENT
Start: 2017-08-11 | End: 2017-09-11 | Stop reason: SDUPTHER

## 2017-09-11 DIAGNOSIS — E87.6 HYPOKALEMIA: ICD-10-CM

## 2017-09-11 RX ORDER — POTASSIUM CHLORIDE 20 MEQ/1
TABLET, EXTENDED RELEASE ORAL
Qty: 30 TABLET | Refills: 0 | Status: SHIPPED | OUTPATIENT
Start: 2017-09-11 | End: 2017-09-21 | Stop reason: SDUPTHER

## 2017-09-21 ENCOUNTER — OFFICE VISIT (OUTPATIENT)
Dept: INTERNAL MEDICINE CLINIC | Age: 52
End: 2017-09-21

## 2017-09-21 VITALS
WEIGHT: 286.8 LBS | SYSTOLIC BLOOD PRESSURE: 135 MMHG | RESPIRATION RATE: 14 BRPM | BODY MASS INDEX: 50.8 KG/M2 | DIASTOLIC BLOOD PRESSURE: 79 MMHG | HEART RATE: 61 BPM

## 2017-09-21 DIAGNOSIS — R80.9 TYPE 2 DIABETES MELLITUS WITH MICROALBUMINURIA, WITHOUT LONG-TERM CURRENT USE OF INSULIN (HCC): Primary | ICD-10-CM

## 2017-09-21 DIAGNOSIS — G47.33 OSA (OBSTRUCTIVE SLEEP APNEA): ICD-10-CM

## 2017-09-21 DIAGNOSIS — E78.5 HYPERLIPIDEMIA, UNSPECIFIED HYPERLIPIDEMIA TYPE: ICD-10-CM

## 2017-09-21 DIAGNOSIS — E11.29 TYPE 2 DIABETES MELLITUS WITH MICROALBUMINURIA, WITHOUT LONG-TERM CURRENT USE OF INSULIN (HCC): ICD-10-CM

## 2017-09-21 DIAGNOSIS — E66.01 MORBID OBESITY WITH BMI OF 50.0-59.9, ADULT (HCC): ICD-10-CM

## 2017-09-21 DIAGNOSIS — E11.29 TYPE 2 DIABETES MELLITUS WITH MICROALBUMINURIA, WITHOUT LONG-TERM CURRENT USE OF INSULIN (HCC): Primary | ICD-10-CM

## 2017-09-21 DIAGNOSIS — R80.9 TYPE 2 DIABETES MELLITUS WITH MICROALBUMINURIA, WITHOUT LONG-TERM CURRENT USE OF INSULIN (HCC): ICD-10-CM

## 2017-09-21 DIAGNOSIS — D25.1 FIBROIDS, INTRAMURAL: ICD-10-CM

## 2017-09-21 DIAGNOSIS — Z23 NEEDS FLU SHOT: ICD-10-CM

## 2017-09-21 DIAGNOSIS — I10 ESSENTIAL HYPERTENSION, BENIGN: ICD-10-CM

## 2017-09-21 DIAGNOSIS — J45.20 MILD INTERMITTENT ASTHMA WITHOUT COMPLICATION: ICD-10-CM

## 2017-09-21 DIAGNOSIS — E87.6 HYPOKALEMIA: ICD-10-CM

## 2017-09-21 PROBLEM — M79.604 RIGHT LEG PAIN: Status: RESOLVED | Noted: 2017-06-21 | Resolved: 2017-09-21

## 2017-09-21 PROBLEM — R60.0 LEG EDEMA, RIGHT: Status: RESOLVED | Noted: 2017-06-21 | Resolved: 2017-09-21

## 2017-09-21 LAB
A/G RATIO: 1.2 (ref 1.1–2.2)
ALBUMIN SERPL-MCNC: 4.6 G/DL (ref 3.4–5)
ALP BLD-CCNC: 66 U/L (ref 40–129)
ALT SERPL-CCNC: 14 U/L (ref 10–40)
ANION GAP SERPL CALCULATED.3IONS-SCNC: 15 MMOL/L (ref 3–16)
AST SERPL-CCNC: 14 U/L (ref 15–37)
BASOPHILS ABSOLUTE: 0 K/UL (ref 0–0.2)
BASOPHILS RELATIVE PERCENT: 0.5 %
BILIRUB SERPL-MCNC: 0.3 MG/DL (ref 0–1)
BUN BLDV-MCNC: 17 MG/DL (ref 7–20)
CALCIUM SERPL-MCNC: 9.8 MG/DL (ref 8.3–10.6)
CHLORIDE BLD-SCNC: 98 MMOL/L (ref 99–110)
CO2: 26 MMOL/L (ref 21–32)
CREAT SERPL-MCNC: 0.7 MG/DL (ref 0.6–1.1)
EOSINOPHILS ABSOLUTE: 0.1 K/UL (ref 0–0.6)
EOSINOPHILS RELATIVE PERCENT: 1.2 %
GFR AFRICAN AMERICAN: >60
GFR NON-AFRICAN AMERICAN: >60
GLOBULIN: 3.9 G/DL
GLUCOSE BLD-MCNC: 90 MG/DL (ref 70–99)
HCT VFR BLD CALC: 37.4 % (ref 36–48)
HEMOGLOBIN: 12.4 G/DL (ref 12–16)
LYMPHOCYTES ABSOLUTE: 2.8 K/UL (ref 1–5.1)
LYMPHOCYTES RELATIVE PERCENT: 39.9 %
MCH RBC QN AUTO: 26.5 PG (ref 26–34)
MCHC RBC AUTO-ENTMCNC: 33 G/DL (ref 31–36)
MCV RBC AUTO: 80.2 FL (ref 80–100)
MONOCYTES ABSOLUTE: 0.4 K/UL (ref 0–1.3)
MONOCYTES RELATIVE PERCENT: 5.5 %
NEUTROPHILS ABSOLUTE: 3.8 K/UL (ref 1.7–7.7)
NEUTROPHILS RELATIVE PERCENT: 52.9 %
PDW BLD-RTO: 15.1 % (ref 12.4–15.4)
PLATELET # BLD: 290 K/UL (ref 135–450)
PMV BLD AUTO: 9 FL (ref 5–10.5)
POTASSIUM SERPL-SCNC: 4 MMOL/L (ref 3.5–5.1)
RBC # BLD: 4.67 M/UL (ref 4–5.2)
SODIUM BLD-SCNC: 139 MMOL/L (ref 136–145)
TOTAL PROTEIN: 8.5 G/DL (ref 6.4–8.2)
WBC # BLD: 7.1 K/UL (ref 4–11)

## 2017-09-21 PROCEDURE — 90630 INFLUENZA, QUADV, 18-64 YRS, ID, PF, MICRO INJ, 0.1ML (FLUZONE QUADV, PF): CPT | Performed by: INTERNAL MEDICINE

## 2017-09-21 PROCEDURE — 99214 OFFICE O/P EST MOD 30 MIN: CPT | Performed by: INTERNAL MEDICINE

## 2017-09-21 PROCEDURE — 90471 IMMUNIZATION ADMIN: CPT | Performed by: INTERNAL MEDICINE

## 2017-09-21 RX ORDER — ALBUTEROL SULFATE 90 UG/1
2 AEROSOL, METERED RESPIRATORY (INHALATION) EVERY 6 HOURS PRN
Qty: 1 INHALER | Refills: 5 | Status: SHIPPED | OUTPATIENT
Start: 2017-09-21 | End: 2019-09-09

## 2017-09-21 RX ORDER — AMLODIPINE BESYLATE 10 MG/1
TABLET ORAL
Qty: 30 TABLET | Refills: 5 | Status: SHIPPED | OUTPATIENT
Start: 2017-09-21 | End: 2018-06-04 | Stop reason: SDUPTHER

## 2017-09-21 RX ORDER — GLIMEPIRIDE 4 MG/1
TABLET ORAL
Qty: 30 TABLET | Refills: 5 | Status: SHIPPED | OUTPATIENT
Start: 2017-09-21 | End: 2017-11-17 | Stop reason: SDUPTHER

## 2017-09-21 RX ORDER — POTASSIUM CHLORIDE 20 MEQ/1
TABLET, EXTENDED RELEASE ORAL
Qty: 30 TABLET | Refills: 5 | Status: SHIPPED | OUTPATIENT
Start: 2017-09-21 | End: 2018-08-26 | Stop reason: SDUPTHER

## 2017-09-21 RX ORDER — LANCETS 33 GAUGE
EACH MISCELLANEOUS
Qty: 100 EACH | Refills: 5 | Status: SHIPPED | OUTPATIENT
Start: 2017-09-21 | End: 2019-01-02 | Stop reason: SDUPTHER

## 2017-09-21 RX ORDER — FEXOFENADINE HCL 180 MG/1
180 TABLET ORAL DAILY
Qty: 30 TABLET | Refills: 5 | Status: SHIPPED | OUTPATIENT
Start: 2017-09-21 | End: 2019-09-09 | Stop reason: SDUPTHER

## 2017-09-21 RX ORDER — FLUTICASONE PROPIONATE 50 MCG
1 SPRAY, SUSPENSION (ML) NASAL DAILY
Qty: 1 BOTTLE | Refills: 5 | Status: SHIPPED | OUTPATIENT
Start: 2017-09-21 | End: 2019-09-09 | Stop reason: SDUPTHER

## 2017-09-21 RX ORDER — VALSARTAN AND HYDROCHLOROTHIAZIDE 320; 25 MG/1; MG/1
1 TABLET, FILM COATED ORAL DAILY
Qty: 30 TABLET | Refills: 5 | Status: SHIPPED | OUTPATIENT
Start: 2017-09-21 | End: 2018-05-29 | Stop reason: SDUPTHER

## 2017-09-21 RX ORDER — METFORMIN HYDROCHLORIDE 500 MG/1
TABLET, EXTENDED RELEASE ORAL
Qty: 120 TABLET | Refills: 5 | Status: SHIPPED | OUTPATIENT
Start: 2017-09-21 | End: 2018-07-27 | Stop reason: SDUPTHER

## 2017-09-21 RX ORDER — ATENOLOL 50 MG/1
50 TABLET ORAL DAILY
Qty: 30 TABLET | Refills: 5 | Status: SHIPPED | OUTPATIENT
Start: 2017-09-21 | End: 2018-06-26 | Stop reason: SDUPTHER

## 2017-09-21 ASSESSMENT — PATIENT HEALTH QUESTIONNAIRE - PHQ9
1. LITTLE INTEREST OR PLEASURE IN DOING THINGS: 0
SUM OF ALL RESPONSES TO PHQ QUESTIONS 1-9: 0
2. FEELING DOWN, DEPRESSED OR HOPELESS: 0
SUM OF ALL RESPONSES TO PHQ9 QUESTIONS 1 & 2: 0

## 2017-09-22 DIAGNOSIS — R77.9 ELEVATED SERUM PROTEIN LEVEL: Primary | ICD-10-CM

## 2017-09-22 LAB
ESTIMATED AVERAGE GLUCOSE: 162.8 MG/DL
HBA1C MFR BLD: 7.3 %

## 2017-09-25 ENCOUNTER — OFFICE VISIT (OUTPATIENT)
Dept: INTERNAL MEDICINE CLINIC | Age: 52
End: 2017-09-25

## 2017-09-25 VITALS
SYSTOLIC BLOOD PRESSURE: 157 MMHG | HEART RATE: 72 BPM | BODY MASS INDEX: 51.12 KG/M2 | WEIGHT: 288.6 LBS | DIASTOLIC BLOOD PRESSURE: 86 MMHG | OXYGEN SATURATION: 95 %

## 2017-09-25 DIAGNOSIS — M54.50 ACUTE RIGHT-SIDED LOW BACK PAIN WITHOUT SCIATICA: Primary | ICD-10-CM

## 2017-09-25 DIAGNOSIS — R35.0 URINARY FREQUENCY: ICD-10-CM

## 2017-09-25 LAB
BILIRUBIN, POC: NORMAL
BLOOD URINE, POC: NORMAL
CLARITY, POC: CLEAR
COLOR, POC: NORMAL
GLUCOSE URINE, POC: NORMAL
KETONES, POC: NORMAL
LEUKOCYTE EST, POC: NORMAL
NITRITE, POC: NORMAL
PH, POC: 6
PROTEIN, POC: NORMAL
SPECIFIC GRAVITY, POC: 1005
UROBILINOGEN, POC: 0.2

## 2017-09-25 PROCEDURE — 81002 URINALYSIS NONAUTO W/O SCOPE: CPT | Performed by: NURSE PRACTITIONER

## 2017-09-25 PROCEDURE — 99213 OFFICE O/P EST LOW 20 MIN: CPT | Performed by: NURSE PRACTITIONER

## 2017-09-25 RX ORDER — NAPROXEN 500 MG/1
500 TABLET ORAL 2 TIMES DAILY WITH MEALS
Qty: 60 TABLET | Refills: 0 | Status: SHIPPED | OUTPATIENT
Start: 2017-09-25 | End: 2017-11-16 | Stop reason: ALTCHOICE

## 2017-10-12 ENCOUNTER — CARE COORDINATION (OUTPATIENT)
Dept: CARE COORDINATION | Age: 52
End: 2017-10-12

## 2017-11-01 NOTE — CARE COORDINATION
Called and left message with [facility] name and cell number, brief description of [facility] role and message to please call.

## 2017-11-16 ENCOUNTER — OFFICE VISIT (OUTPATIENT)
Dept: INTERNAL MEDICINE CLINIC | Age: 52
End: 2017-11-16

## 2017-11-16 VITALS
HEART RATE: 65 BPM | DIASTOLIC BLOOD PRESSURE: 84 MMHG | WEIGHT: 288 LBS | SYSTOLIC BLOOD PRESSURE: 138 MMHG | BODY MASS INDEX: 51.02 KG/M2 | OXYGEN SATURATION: 95 %

## 2017-11-16 DIAGNOSIS — E78.5 HYPERLIPIDEMIA, UNSPECIFIED HYPERLIPIDEMIA TYPE: ICD-10-CM

## 2017-11-16 DIAGNOSIS — R77.9 ELEVATED SERUM PROTEIN LEVEL: ICD-10-CM

## 2017-11-16 DIAGNOSIS — G47.33 OSA (OBSTRUCTIVE SLEEP APNEA): ICD-10-CM

## 2017-11-16 DIAGNOSIS — I10 ESSENTIAL HYPERTENSION, BENIGN: ICD-10-CM

## 2017-11-16 DIAGNOSIS — R80.9 TYPE 2 DIABETES MELLITUS WITH MICROALBUMINURIA, WITHOUT LONG-TERM CURRENT USE OF INSULIN (HCC): Primary | ICD-10-CM

## 2017-11-16 DIAGNOSIS — E11.29 TYPE 2 DIABETES MELLITUS WITH MICROALBUMINURIA, WITHOUT LONG-TERM CURRENT USE OF INSULIN (HCC): ICD-10-CM

## 2017-11-16 DIAGNOSIS — E55.9 VITAMIN D DEFICIENCY: ICD-10-CM

## 2017-11-16 DIAGNOSIS — R80.9 TYPE 2 DIABETES MELLITUS WITH MICROALBUMINURIA, WITHOUT LONG-TERM CURRENT USE OF INSULIN (HCC): ICD-10-CM

## 2017-11-16 DIAGNOSIS — J45.20 MILD INTERMITTENT ASTHMA WITHOUT COMPLICATION: ICD-10-CM

## 2017-11-16 DIAGNOSIS — E11.29 TYPE 2 DIABETES MELLITUS WITH MICROALBUMINURIA, WITHOUT LONG-TERM CURRENT USE OF INSULIN (HCC): Primary | ICD-10-CM

## 2017-11-16 LAB
BASOPHILS ABSOLUTE: 0 K/UL (ref 0–0.2)
BASOPHILS RELATIVE PERCENT: 0.6 %
EOSINOPHILS ABSOLUTE: 0.1 K/UL (ref 0–0.6)
EOSINOPHILS RELATIVE PERCENT: 1 %
HBA1C MFR BLD: 5.8 %
HCT VFR BLD CALC: 37.2 % (ref 36–48)
HEMOGLOBIN: 12.4 G/DL (ref 12–16)
LYMPHOCYTES ABSOLUTE: 2.9 K/UL (ref 1–5.1)
LYMPHOCYTES RELATIVE PERCENT: 40 %
MCH RBC QN AUTO: 26.5 PG (ref 26–34)
MCHC RBC AUTO-ENTMCNC: 33.3 G/DL (ref 31–36)
MCV RBC AUTO: 79.7 FL (ref 80–100)
MONOCYTES ABSOLUTE: 0.5 K/UL (ref 0–1.3)
MONOCYTES RELATIVE PERCENT: 6.4 %
NEUTROPHILS ABSOLUTE: 3.8 K/UL (ref 1.7–7.7)
NEUTROPHILS RELATIVE PERCENT: 52 %
PDW BLD-RTO: 16.1 % (ref 12.4–15.4)
PLATELET # BLD: 276 K/UL (ref 135–450)
PMV BLD AUTO: 9.3 FL (ref 5–10.5)
RBC # BLD: 4.67 M/UL (ref 4–5.2)
WBC # BLD: 7.2 K/UL (ref 4–11)

## 2017-11-16 PROCEDURE — 83036 HEMOGLOBIN GLYCOSYLATED A1C: CPT | Performed by: INTERNAL MEDICINE

## 2017-11-16 PROCEDURE — 99214 OFFICE O/P EST MOD 30 MIN: CPT | Performed by: INTERNAL MEDICINE

## 2017-11-16 NOTE — PROGRESS NOTES
[facility]- Internal Medicine  Progress Note  Dian Roman. Rosalina Sosa MD, MPH     Assessment/Plan    Marilyn Parr was seen today for follow-up. Diagnoses and all orders for this visit:  Type 2 diabetes mellitus with microalbuminuria ([address]  Has added a small amount of exercise into schedule  Tolerating metformin,a nd brings BS readings - which are 120- 300, mostly in the mid-hi 100s  POCT A1C 5.8- much lower than expected based on home readings- will recheck in the lab  CMP,A1C    Essential hypertension, benign  Stable, at goal, using thigh cuff,continue current regimen. Goals reviewed with pt, who will continue to monitor. ZA (obstructive sleep apnea)  Non compliant with RX  Advised to resume nighlty  Follow up with Dr Brown/Hill    Mild intermittent asthma without complication  Stable, rarely using albuterol    Hyperlipidemia  No change    Elevated serum protein level  Check SPEP, CBC,CMP    Vitamin D deficiency  Recheck with labs today              Discussed medications with patient, who voiced understanding of their use and indications. All questions answered. Return in about 2 months (around 1/16/2018) for 30 minute.                  Bria Sheth   YOB: 1965    Date of Visit:  11/16/2017    Allergies   Allergen Reactions    Prednisone      Made her feel loopy     Outpatient Prescriptions Marked as Taking for the 11/16/17 encounter (Office Visit) with Kenda Jeans, MD   Medication Sig Dispense Refill    naproxen (EC NAPROSYN) 500 MG EC tablet Take 1 tablet by mouth 2 times daily (with meals) 60 tablet 0    ONETOUCH DELICA LANCETS 14R MISC TEST THREE TIMES DAILY 100 each 5    amLODIPine (NORVASC) 10 MG tablet TAKE ONE TABLET BY MOUTH DAILY 30 tablet 5    valsartan-hydrochlorothiazide (DIOVAN HCT) 320-25 MG per tablet Take 1 tablet by mouth daily 30 tablet 5    fluticasone (FLONASE) 50 MCG/ACT nasal spray 1 spray by Nasal route daily 1 Bottle 5    fexofenadine (ALLEGRA ALLERGY) 180 MG tablet Take 1 tablet by mouth daily Start after completion of Allegra-D 30 tablet 5    metFORMIN (GLUCOPHAGE-XR) 500 MG extended release tablet TAKE FOUR TABLETS BY MOUTH DAILY WITH BREAKFAST 120 tablet 5    potassium chloride (KLOR-CON M) 20 MEQ extended release tablet TAKE ONE TABLET BY MOUTH DAILY 30 tablet 5    glimepiride (AMARYL) 4 MG tablet TAKE ONE TABLET BY MOUTH EVERY MORNING BEFORE BREAKFAST 30 tablet 5    atenolol (TENORMIN) 50 MG tablet Take 1 tablet by mouth daily 30 tablet 5    albuterol sulfate HFA (PROVENTIL HFA) 108 (90 Base) MCG/ACT inhaler Inhale 2 puffs into the lungs every 6 hours as needed for Wheezing 1 Inhaler 5    glucose blood VI test strips (ONE TOUCH ULTRA TEST) strip TEST THREE TIMES A  strip 4    ONE TOUCH ULTRA TEST strip TEST THREE TIMES A  strip 4    Multiple Vitamins-Minerals (THERAPEUTIC MULTIVITAMIN-MINERALS) tablet Take 1 tablet by mouth daily      aspirin 81 MG chewable tablet CHEW ONE TABLET BY MOUTH DAILY 30 tablet 10    simvastatin (ZOCOR) 20 MG tablet TAKE ONE TABLET BY MOUTH EVERY NIGHT AT BEDTIME 30 tablet 10       Chief Complaint   Patient presents with    Follow-up     DM   per MA triage  HPI  Pt is here for follow up of chronic medical problems, mainly DM,HTN    No pain, not taking naproxen  Back on 2 g metformin , tolerating    Asthma:  Current treatment includes beta agonist inhalers. Using preventive medication(s) consistently: not applicable. Residual symptoms: dyspnea. Patient denies any other symptoms. She requires her rescue inhaler 1 time(s) per month. Treatment Adherence:   Medication compliance:  compliant all of the time  Diet compliance:  noncompliant: did not go to DM ed  Weight trend: stable- morbidly obese  Current exercise: stationary bike 5 minutes, 15 minutes exercise tape QOD. 3-4 x/ week   Barriers: fear of failure    Diabetes Mellitus Type 2: Current symptoms/problems include none.     Home blood sugar records: fasting range: 150-170  Any episodes of hypoglycemia? no  Eye exam current (within one year): yes  Tobacco history: She  reports that she has never smoked. She has never used smokeless tobacco.   Daily Aspirin? Yes    Hypertension:  Home blood pressure monitoring: No.  She is not adherent to a low sodium diet. Patient denies chest pain, headache, lightheadedness, blurred vision, peripheral edema, palpitations, dry cough and fatigue. Antihypertensive medication side effects: no medication side effects noted. Use of agents associated with hypertension: none. Hyperlipidemia:  No new myalgias or GI upset on simvastatin (Zocor). Lab Results   Component Value Date    LABA1C 5.8 11/16/2017    LABA1C 7.3 09/21/2017    LABA1C 7.5 06/21/2017     Lab Results   Component Value Date    LABMICR 4.30 (H) 06/21/2017    CREATININE 0.7 09/21/2017     Lab Results   Component Value Date    ALT 14 09/21/2017    AST 14 (L) 09/21/2017     Lab Results   Component Value Date    CHOL 144 06/13/2017    TRIG 86 06/13/2017    HDL 41 06/13/2017    LDLCALC 86 06/13/2017            No results found for: [facility]  Lab Results   Component Value Date    TSH 1.90 10/03/2016    TSH 2.68 02/19/2016    TSH 1.73 10/27/2014       Vit D, 25-Hydroxy (ng/ml)   Date Value   05/21/2013 14 (L)   Sleep Apnea:  Current treatment: cPAP. Compliance: compliant most of the time. Residual symptoms include: snoring. Review of Systems  As documented in HPI   Vitals:    11/16/17 1411 11/16/17 1429   BP: (!) 148/96 138/84   Pulse: 65    SpO2: 95%    Weight: 288 lb (130.6 kg)      Body mass index is 51.02 kg/m². Wt Readings from Last 3 Encounters:   11/16/17 288 lb (130.6 kg)   09/25/17 288 lb 9.6 oz (130.9 kg)   09/21/17 286 lb 12.8 oz (130.1 kg)     BP Readings from Last 3 Encounters:   11/16/17 138/84   09/25/17 (!) 157/86   09/21/17 135/79        Physical Exam   Constitutional: She is oriented to person, place, and time.  She appears well-developed and well-nourished. No distress. Eyes: Conjunctivae are normal. No scleral icterus. Neck: No JVD present. Cardiovascular: Normal rate, regular rhythm, normal heart sounds and intact distal pulses. Pulmonary/Chest: Breath sounds normal. No respiratory distress. She has no wheezes. She has no rales. Abdominal: Soft. She exhibits no mass. There is no tenderness. Musculoskeletal: She exhibits no edema. Neurological: She is alert and oriented to person, place, and time. Skin: Skin is warm and dry. Psychiatric: She has a normal mood and affect.

## 2017-11-17 ENCOUNTER — CARE COORDINATION (OUTPATIENT)
Dept: CARE COORDINATION | Age: 52
End: 2017-11-17

## 2017-11-17 DIAGNOSIS — E55.9 VITAMIN D DEFICIENCY: Primary | ICD-10-CM

## 2017-11-17 LAB
A/G RATIO: 1.2 (ref 1.1–2.2)
ALBUMIN SERPL-MCNC: 3.2 G/DL (ref 3.1–4.9)
ALBUMIN SERPL-MCNC: 4.4 G/DL (ref 3.4–5)
ALP BLD-CCNC: 67 U/L (ref 40–129)
ALPHA-1-GLOBULIN: 0.3 G/DL (ref 0.2–0.4)
ALPHA-2-GLOBULIN: 0.8 G/DL (ref 0.4–1.1)
ALT SERPL-CCNC: 14 U/L (ref 10–40)
ANION GAP SERPL CALCULATED.3IONS-SCNC: 11 MMOL/L (ref 3–16)
AST SERPL-CCNC: 13 U/L (ref 15–37)
BETA GLOBULIN: 1.5 G/DL (ref 0.9–1.6)
BILIRUB SERPL-MCNC: 0.3 MG/DL (ref 0–1)
BUN BLDV-MCNC: 14 MG/DL (ref 7–20)
CALCIUM SERPL-MCNC: 10.1 MG/DL (ref 8.3–10.6)
CHLORIDE BLD-SCNC: 98 MMOL/L (ref 99–110)
CO2: 30 MMOL/L (ref 21–32)
CREAT SERPL-MCNC: 0.6 MG/DL (ref 0.6–1.1)
ESTIMATED AVERAGE GLUCOSE: 174.3 MG/DL
GAMMA GLOBULIN: 2.2 G/DL (ref 0.6–1.8)
GFR AFRICAN AMERICAN: >60
GFR NON-AFRICAN AMERICAN: >60
GLOBULIN: 3.6 G/DL
GLUCOSE BLD-MCNC: 68 MG/DL (ref 70–99)
HBA1C MFR BLD: 7.7 %
POTASSIUM SERPL-SCNC: 3.9 MMOL/L (ref 3.5–5.1)
SODIUM BLD-SCNC: 139 MMOL/L (ref 136–145)
SPE/IFE INTERPRETATION: NORMAL
TOTAL PROTEIN: 8 G/DL (ref 6.4–8.2)
VITAMIN D 25-HYDROXY: 14.1 NG/ML

## 2017-11-17 RX ORDER — GLIMEPIRIDE 2 MG/1
TABLET ORAL
Qty: 30 TABLET | Refills: 5 | Status: SHIPPED | OUTPATIENT
Start: 2017-11-17 | End: 2018-01-18 | Stop reason: SDUPTHER

## 2017-11-17 RX ORDER — ERGOCALCIFEROL 1.25 MG/1
50000 CAPSULE ORAL WEEKLY
Qty: 12 CAPSULE | Refills: 0 | Status: SHIPPED | OUTPATIENT
Start: 2017-11-17 | End: 2018-04-05

## 2018-01-18 ENCOUNTER — CARE COORDINATION (OUTPATIENT)
Dept: CARE COORDINATION | Age: 53
End: 2018-01-18

## 2018-01-18 ENCOUNTER — OFFICE VISIT (OUTPATIENT)
Dept: INTERNAL MEDICINE CLINIC | Age: 53
End: 2018-01-18

## 2018-01-18 VITALS
BODY MASS INDEX: 50.64 KG/M2 | WEIGHT: 285.8 LBS | SYSTOLIC BLOOD PRESSURE: 138 MMHG | DIASTOLIC BLOOD PRESSURE: 84 MMHG | HEIGHT: 63 IN | HEART RATE: 79 BPM | OXYGEN SATURATION: 94 %

## 2018-01-18 DIAGNOSIS — R80.9 TYPE 2 DIABETES MELLITUS WITH MICROALBUMINURIA, WITHOUT LONG-TERM CURRENT USE OF INSULIN (HCC): Primary | ICD-10-CM

## 2018-01-18 DIAGNOSIS — E78.5 HYPERLIPIDEMIA, UNSPECIFIED HYPERLIPIDEMIA TYPE: ICD-10-CM

## 2018-01-18 DIAGNOSIS — E11.29 TYPE 2 DIABETES MELLITUS WITH MICROALBUMINURIA, WITHOUT LONG-TERM CURRENT USE OF INSULIN (HCC): Primary | ICD-10-CM

## 2018-01-18 DIAGNOSIS — G47.33 OSA (OBSTRUCTIVE SLEEP APNEA): ICD-10-CM

## 2018-01-18 DIAGNOSIS — I10 ESSENTIAL HYPERTENSION, BENIGN: ICD-10-CM

## 2018-01-18 DIAGNOSIS — E66.01 MORBID OBESITY WITH BMI OF 50.0-59.9, ADULT (HCC): ICD-10-CM

## 2018-01-18 PROCEDURE — 99214 OFFICE O/P EST MOD 30 MIN: CPT | Performed by: INTERNAL MEDICINE

## 2018-01-18 RX ORDER — GLIMEPIRIDE 4 MG/1
TABLET ORAL
Qty: 30 TABLET | Refills: 3 | Status: SHIPPED | OUTPATIENT
Start: 2018-01-18 | End: 2018-07-27 | Stop reason: SDUPTHER

## 2018-01-18 NOTE — ASSESSMENT & PLAN NOTE
Uncontrolled based on pt reports  Total noncompliance with diet- needs info on carb counting/ diet  CMP,A1C  Increase glimepiride, consider GLP 1 agent

## 2018-01-18 NOTE — PROGRESS NOTES
[facility]- Internal Medicine  Progress Note  Kisha Roland. Isamar Denney MD, MPH     Assessment/Plan    Sabiha Tom was seen today for follow-up. Diagnoses and all orders for this visit:  Type 2 diabetes mellitus with microalbuminuria ([facility]  Uncontrolled based on pt reports  Total noncompliance with diet- needs info on carb counting/ diet  CMP,A1C  Increase glimepiride, consider GLP 1 agent      Essential hypertension, benign  Stable, at goal, continue current regimen. Goals reviewed with pt, who was advised to monitor. CMP, TSH  Con current Rx, work on wt loss, cont cpap    Hyperlipidemia  Stable on zocor, asa  recheck in June 2018  Lifestyle mods    ZA (obstructive sleep apnea)  Complaint with CPAP without residual sx    Morbid obesity with BMI of 50.0-59.9, adult ([facility]  Working on diet with RN CC  Consider GLP 1 agent             Discussed medications with patient, who voiced understanding of their use and indications. All questions answered. Return in about 3 months (around 4/18/2018). Pepito Tucker   YOB: 1965    Date of Visit:  1/18/2018    Allergies   Allergen Reactions    Prednisone      Made her feel loopy     Outpatient Prescriptions Marked as Taking for the 1/18/18 encounter (Office Visit) with Marlon Chino MD   Medication Sig Dispense Refill    vitamin D (ERGOCALCIFEROL) 78727 units CAPS capsule Take 1 capsule by mouth once a week Take prescription supplement for 3 months, then switch to OTC vitamin D3 at 2000 IU/day for maintenance.  12 capsule 0    glimepiride (AMARYL) 2 MG tablet TAKE ONE TABLET BY MOUTH EVERY MORNING BEFORE BREAKFAST 30 tablet 5    ONETOUCH DELICA LANCETS 92A MISC TEST THREE TIMES DAILY 100 each 5    amLODIPine (NORVASC) 10 MG tablet TAKE ONE TABLET BY MOUTH DAILY 30 tablet 5    valsartan-hydrochlorothiazide (DIOVAN HCT) 320-25 MG per tablet Take 1 tablet by mouth daily 30 tablet 5    fluticasone (FLONASE) 50 MCG/ACT nasal spray 1 spray by Nasal route daily 1 Bottle 5    fexofenadine (ALLEGRA ALLERGY) 180 MG tablet Take 1 tablet by mouth daily Start after completion of Allegra-D 30 tablet 5    metFORMIN (GLUCOPHAGE-XR) 500 MG extended release tablet TAKE FOUR TABLETS BY MOUTH DAILY WITH BREAKFAST 120 tablet 5    potassium chloride (KLOR-CON M) 20 MEQ extended release tablet TAKE ONE TABLET BY MOUTH DAILY 30 tablet 5    atenolol (TENORMIN) 50 MG tablet Take 1 tablet by mouth daily 30 tablet 5    albuterol sulfate HFA (PROVENTIL HFA) 108 (90 Base) MCG/ACT inhaler Inhale 2 puffs into the lungs every 6 hours as needed for Wheezing 1 Inhaler 5    glucose blood VI test strips (ONE TOUCH ULTRA TEST) strip TEST THREE TIMES A  strip 4    ONE TOUCH ULTRA TEST strip TEST THREE TIMES A  strip 4    Multiple Vitamins-Minerals (THERAPEUTIC MULTIVITAMIN-MINERALS) tablet Take 1 tablet by mouth daily      aspirin 81 MG chewable tablet CHEW ONE TABLET BY MOUTH DAILY 30 tablet 10    simvastatin (ZOCOR) 20 MG tablet TAKE ONE TABLET BY MOUTH EVERY NIGHT AT BEDTIME 30 tablet 10    ferrous sulfate (FE TABS) 325 (65 FE) MG EC tablet Take 1 tablet by mouth 2 times daily 60 tablet 3       Chief Complaint   Patient presents with    Follow-up     2 months   per MA triage  HPI  Pt is here for follow up of chronic medical problems  CC:  Patient presents for follow-up of   1. Type 2 diabetes mellitus with microalbuminuria, without long-term current use of insulin ([facility]    2. Hyperlipidemia, unspecified hyperlipidemia type    3. Essential hypertension, benign    4. ZA (obstructive sleep apnea)    5. Morbid obesity with BMI of 50.0-59.9, adult ([facility]        Treatment Adherence:   Medication compliance:  compliant all of the time  Diet compliance:  noncompliant: see below  Weight trend: stable  Current exercise: walks every day at work - 2 flights up and down all day.    Barriers: none    Diabetes Mellitus Type 2: Current symptoms/problems include

## 2018-01-18 NOTE — ASSESSMENT & PLAN NOTE
Stable, at goal, continue current regimen. Goals reviewed with pt, who was advised to monitor.   CMP, TSH  Con current Rx, work on wt loss, cont cpap

## 2018-01-18 NOTE — PATIENT INSTRUCTIONS
plate format. Talk to your doctor, a dietitian, or a diabetes educator about your concerns. Carbohydrate counting  With carbohydrate counting, you plan meals based on the amount of carbohydrate in each food. Carbohydrate raises blood sugar higher and more quickly than any other nutrient. It is found in desserts, breads and cereals, and fruit. It's also found in starchy vegetables such as potatoes and corn, grains such as rice and pasta, and milk and yogurt. Spreading carbohydrate throughout the day helps keep your blood sugar levels within your target range. Your daily amount depends on several things, including your weight, how active you are, which diabetes medicines you take, and what your goals are for your blood sugar levels. A registered dietitian or diabetes educator can help you plan how much carbohydrate to include in each meal and snack. A guideline for your daily amount of carbohydrate is:  · 45 to 60 grams at each meal. That's about the same as 3 to 4 carbohydrate servings. · 15 to 20 grams at each snack. That's about the same as 1 carbohydrate serving. The Nutrition Facts label on packaged foods tells you how much carbohydrate is in a serving of the food. First, look at the serving size on the food label. Is that the amount you eat in a serving? All of the nutrition information on a food label is based on that serving size. So if you eat more or less than that, you'll need to adjust the other numbers. Total carbohydrate is the next thing you need to look for on the label. If you count carbohydrate servings, one serving of carbohydrate is 15 grams. For foods that don't come with labels, such as fresh fruits and vegetables, you'll need a guide that lists carbohydrate in these foods. Ask your doctor, dietitian, or diabetes educator about books or other nutrition guides you can use.   If you take insulin, you need to know how many grams of carbohydrate are in a meal. This lets you know how much Version: 11.5  © 6377-6497 Healthwise, "[employer]. Care instructions adapted under license by [facility] ([facility]). If you have questions about a medical condition or this instruction, always ask your healthcare professional. [address] any warranty or liability for your use of this information. Patient Education        Learning About Diabetes Food Guidelines  Your Care Instructions    Meal planning is important to manage diabetes. It helps keep your blood sugar at a target level (which you set with your doctor). You don't have to eat special foods. You can eat what your family eats, including sweets once in a while. But you do have to pay attention to how often you eat and how much you eat of certain foods. You may want to work with a dietitian or a certified diabetes educator (CDE) to help you plan meals and snacks. A dietitian or CDE can also help you lose weight if that is one of your goals. What should you know about eating carbs? Managing the amount of carbohydrate (carbs) you eat is an important part of healthy meals when you have diabetes. Carbohydrate is found in many foods. · Learn which foods have carbs. And learn the amounts of carbs in different foods. ¨ Bread, cereal, pasta, and rice have about 15 grams of carbs in a serving. A serving is 1 slice of bread (1 ounce), ½ cup of cooked cereal, or 1/3 cup of cooked pasta or rice. ¨ Fruits have 15 grams of carbs in a serving. A serving is 1 small fresh fruit, such as an apple or orange; ½ of a banana; ½ cup of cooked or canned fruit; ½ cup of fruit juice; 1 cup of melon or raspberries; or 2 tablespoons of dried fruit. ¨ Milk and no-sugar-added yogurt have 15 grams of carbs in a serving. A serving is 1 cup of milk or 2/3 cup of no-sugar-added yogurt. ¨ Starchy vegetables have 15 grams of carbs in a serving.  A serving is ½ cup of mashed potatoes or sweet potato; 1 cup winter squash; ½ of a small baked potato; ½ cup of cooked beans; or ½ cup cooked corn or green peas. · Learn how much carbs to eat each day and at each meal. A dietitian or CDE can teach you how to keep track of the amount of carbs you eat. This is called carbohydrate counting. · If you are not sure how to count carbohydrate grams, use the Plate Method to plan meals. It is a good, quick way to make sure that you have a balanced meal. It also helps you spread carbs throughout the day. ¨ Divide your plate by types of foods. Put non-starchy vegetables on half the plate, meat or other protein food on one-quarter of the plate, and a grain or starchy vegetable in the final quarter of the plate. To this you can add a small piece of fruit and 1 cup of milk or yogurt, depending on how many carbs you are supposed to eat at a meal.  · Try to eat about the same amount of carbs at each meal. Do not \"save up\" your daily allowance of carbs to eat at one meal.  · Proteins have very little or no carbs per serving. Examples of proteins are beef, chicken, turkey, fish, eggs, tofu, cheese, cottage cheese, and peanut butter. A serving size of meat is 3 ounces, which is about the size of a deck of cards. Examples of meat substitute serving sizes (equal to 1 ounce of meat) are 1/4 cup of cottage cheese, 1 egg, 1 tablespoon of peanut butter, and ½ cup of tofu. How can you eat out and still eat healthy? · Learn to estimate the serving sizes of foods that have carbohydrate. If you measure food at home, it will be easier to estimate the amount in a serving of restaurant food. · If the meal you order has too much carbohydrate (such as potatoes, corn, or baked beans), ask to have a low-carbohydrate food instead. Ask for a salad or green vegetables. · If you use insulin, check your blood sugar before and after eating out to help you plan how much to eat in the future. · If you eat more carbohydrate at a meal than you had planned, take a walk or do other exercise.  This will help lower your blood

## 2018-01-18 NOTE — CARE COORDINATION
Independent  Is patient able to live independently?:  Yes     Current Housing:  Private Residence        Per the Fall Risk Screening, did the patient have 2 or more falls or 1 fall with injury in the past year?:  No     Frequent urination at night?:  No  Do you use rails/bars?:  No  Do you have a non-slip tub mat?:  No     Are you experiencing loss of meaning?:  No  Are you experiencing loss of hope and peace?:  No     Thinking about your patient's physical health needs, are there any symptoms or problems (risk indicators) you are unsure about that require further investigation?:  Mild vague physical symptoms or problems; but do not impact on daily life or are not of concern to patient   Are the patients physical health problems impacting on their mental well-being?:  Mild impact on mental well-being e.g. \"\"feeling fed-up\"\", \"\"reduced enjoyment\"\"   Are there any problems with your patients lifestyle behaviors (alcohol, drugs, diet, exercise) that are impacting on physical or mental well-being?:  Some mild concern of potential negative impact on well-being   Do you have any other concerns about your patients mental well-being?  How would you rate their severity and impact on the patient?:  No identified areas of concern   How would you rate their home environment in terms of safety and stability (including domestic violence, insecure housing, neighbor harassment)?:  Consistently safe, supportive, stable, no identified problems   How do daily activities impact on the patient's well-being? (include current or anticipated unemployment, work, caregiving, access to transportation or other):  No identified problems or perceived positive benefits   How would you rate their social network (family, work, friends)?:  Good participation with social networks   How would you rate their financial resources (including ability to afford all required medical care)?:  Financially secure, resources adequate, no identified problems How wells does the patient now understand their health and well-being (symptoms, signs or risk factors) and what they need to do to manage their health?:  Reasonable to good understanding and already engages in managing health or is willing to undertake better management   How well do you think your patient can engage in healthcare discussions? (Barriers include language, deafness, aphasia, alcohol or drug problems, learning difficulties, concentration):  Clear and open communication, no identified barriers   Do other services need to be involved to help this patient?:  Other care/services not required at this time   Are current services involved with this patient well-coordinated? (Include coordination with other services you are now recommendation): All required care/services in place and well-coordinated   Suggested Interventions and Community Resources  Diabetes Education: In Process    Life Skills Coaching: In Process   Registered Dietician:  Not Started   Zone Management Tools:  Completed         Set up/Review an Education Plan, Set up/Review Goals              Prior to Admission medications    Medication Sig Start Date End Date Taking? Authorizing Provider   glimepiride (AMARYL) 4 MG tablet TAKE ONE TABLET BY MOUTH EVERY MORNING BEFORE BREAKFAST 1/18/18   Heron Felix MD   vitamin D (ERGOCALCIFEROL) 22138 units CAPS capsule Take 1 capsule by mouth once a week Take prescription supplement for 3 months, then switch to OTC vitamin D3 at 2000 IU/day for maintenance.  11/17/17   Heron Felix MD   [address] [facility] [address] [facility] TEST THREE TIMES DAILY 9/21/17   Heron Felix MD   amLODIPine (NORVASC) 10 MG tablet TAKE ONE TABLET BY MOUTH DAILY 9/21/17   Heron Felix MD   valsartan-hydrochlorothiazide (DIOVAN HCT) 320-25 MG per tablet Take 1 tablet by mouth daily 9/21/17   Heron Felix MD   fluticasone [facility]) 50 MCG/ACT nasal spray 1 spray

## 2018-02-21 ENCOUNTER — OFFICE VISIT (OUTPATIENT)
Dept: INTERNAL MEDICINE CLINIC | Age: 53
End: 2018-02-21

## 2018-02-21 VITALS
DIASTOLIC BLOOD PRESSURE: 76 MMHG | RESPIRATION RATE: 14 BRPM | BODY MASS INDEX: 51.19 KG/M2 | SYSTOLIC BLOOD PRESSURE: 124 MMHG | HEART RATE: 68 BPM | WEIGHT: 284.4 LBS

## 2018-02-21 DIAGNOSIS — R80.9 TYPE 2 DIABETES MELLITUS WITH MICROALBUMINURIA, WITHOUT LONG-TERM CURRENT USE OF INSULIN (HCC): Primary | ICD-10-CM

## 2018-02-21 DIAGNOSIS — E11.29 TYPE 2 DIABETES MELLITUS WITH MICROALBUMINURIA, WITHOUT LONG-TERM CURRENT USE OF INSULIN (HCC): Primary | ICD-10-CM

## 2018-02-21 PROCEDURE — 99213 OFFICE O/P EST LOW 20 MIN: CPT | Performed by: INTERNAL MEDICINE

## 2018-02-21 RX ORDER — PIOGLITAZONEHYDROCHLORIDE 15 MG/1
15 TABLET ORAL DAILY
Qty: 30 TABLET | Refills: 2 | Status: SHIPPED | OUTPATIENT
Start: 2018-02-21 | End: 2019-01-07 | Stop reason: ALTCHOICE

## 2018-02-21 NOTE — PATIENT INSTRUCTIONS

## 2018-02-21 NOTE — PROGRESS NOTES
TOUCH ULTRA TEST strip TEST THREE TIMES A DAY 17  Yes Nicole Martinez MD   Multiple Vitamins-Minerals (THERAPEUTIC MULTIVITAMIN-MINERALS) tablet Take 1 tablet by mouth daily   Yes Historical Provider, MD   aspirin 81 MG chewable tablet CHEW ONE TABLET BY MOUTH DAILY 17  Yes Nicole Martinez MD   simvastatin (ZOCOR) 20 MG tablet TAKE ONE TABLET BY MOUTH EVERY NIGHT AT BEDTIME 17  Yes Nicole Martinez MD   ferrous sulfate (FE TABS) 325 (65 FE) MG EC tablet Take 1 tablet by mouth 2 times daily 10/4/16  Yes Nicole Martinez MD       Past Medical History:   Diagnosis Date    Asthma exacerbation 2016    Back pain     Diabetes mellitus ([address] 2014    TYPE 2    Epicondylitis, lateral (tennis elbow) 10/27/2014    Fibroid 2011    Hyperlipidemia     Hypertension     Iron deficiency anemia due to chronic blood loss 10/4/2016    Knee pain     ZA (obstructive sleep apnea) 3/1/2011    Sleep study done 2011     Vitamin D deficiency 2012       Past Surgical History:   Procedure Laterality Date     SECTION      x3    COLONOSCOPY      TUBAL LIGATION      BTL       Social History   Substance Use Topics    Smoking status: Never Smoker    Smokeless tobacco: Never Used      Comment: passive smoke exposure     Alcohol use 0.0 oz/week      Comment: occasionally        Family History   Problem Relation Age of Onset    Diabetes Mother      DM 2    Heart Attack Mother     Heart Disease Mother     High Blood Pressure Mother     Cancer Father      Lung    Stroke Father 48     smoker    No Known Problems Brother     Hypertension Sister     Hypertension Brother     No Known Problems Maternal Aunt     No Known Problems Maternal Uncle     No Known Problems Paternal Aunt     No Known Problems Paternal Uncle     No Known Problems Maternal Grandmother     No Known Problems Maternal Grandfather     No Known Problems Paternal Grandmother     No Known Problems Paternal Grandfather     No Known Problems Other     Rheum Arthritis Neg Hx     Osteoarthritis Neg Hx     Asthma Neg Hx     Breast Cancer Neg Hx     Heart Failure Neg Hx     High Cholesterol Neg Hx     Migraines Neg Hx     Ovarian Cancer Neg Hx     Rashes/Skin Problems Neg Hx     Seizures Neg Hx     Thyroid Disease Neg Hx

## 2018-03-06 ENCOUNTER — TELEPHONE (OUTPATIENT)
Dept: INTERNAL MEDICINE CLINIC | Age: 53
End: 2018-03-06

## 2018-03-06 DIAGNOSIS — E78.5 HYPERLIPIDEMIA: ICD-10-CM

## 2018-03-06 DIAGNOSIS — E11.9 TYPE 2 DIABETES MELLITUS WITHOUT COMPLICATION (HCC): ICD-10-CM

## 2018-03-06 RX ORDER — SIMVASTATIN 20 MG
TABLET ORAL
Qty: 30 TABLET | Refills: 8 | Status: SHIPPED | OUTPATIENT
Start: 2018-03-06 | End: 2019-04-02 | Stop reason: SDUPTHER

## 2018-03-06 RX ORDER — ASPIRIN 81 MG/1
TABLET, CHEWABLE ORAL
Qty: 30 TABLET | Refills: 9 | Status: SHIPPED | OUTPATIENT
Start: 2018-03-06 | End: 2019-04-02 | Stop reason: SDUPTHER

## 2018-03-06 NOTE — TELEPHONE ENCOUNTER
Contacted patient to let her know what Dr Tamiko Greenwood advised regarding Vit D3 rx. Patient voiced understanding and will  Vit D OTC. Patient stated that the Dulaglutide that was called in was too expensive- $500 for her to . Wants to know if there was something else that she could take? Please advise. We also have insulin samples available.

## 2018-03-07 ENCOUNTER — TELEPHONE (OUTPATIENT)
Dept: ORTHOPEDIC SURGERY | Age: 53
End: 2018-03-07

## 2018-04-03 ENCOUNTER — CARE COORDINATION (OUTPATIENT)
Dept: CARE COORDINATION | Age: 53
End: 2018-04-03

## 2018-04-05 ENCOUNTER — OFFICE VISIT (OUTPATIENT)
Dept: INTERNAL MEDICINE CLINIC | Age: 53
End: 2018-04-05

## 2018-04-05 VITALS
HEART RATE: 74 BPM | RESPIRATION RATE: 14 BRPM | SYSTOLIC BLOOD PRESSURE: 140 MMHG | DIASTOLIC BLOOD PRESSURE: 70 MMHG | WEIGHT: 276.2 LBS | BODY MASS INDEX: 49.71 KG/M2

## 2018-04-05 DIAGNOSIS — E78.5 HYPERLIPIDEMIA, UNSPECIFIED HYPERLIPIDEMIA TYPE: ICD-10-CM

## 2018-04-05 DIAGNOSIS — R80.9 TYPE 2 DIABETES MELLITUS WITH MICROALBUMINURIA, WITHOUT LONG-TERM CURRENT USE OF INSULIN (HCC): ICD-10-CM

## 2018-04-05 DIAGNOSIS — Z12.39 SCREENING FOR BREAST CANCER: Primary | ICD-10-CM

## 2018-04-05 DIAGNOSIS — I10 ESSENTIAL HYPERTENSION, BENIGN: ICD-10-CM

## 2018-04-05 DIAGNOSIS — E11.29 TYPE 2 DIABETES MELLITUS WITH MICROALBUMINURIA, WITHOUT LONG-TERM CURRENT USE OF INSULIN (HCC): ICD-10-CM

## 2018-04-05 PROCEDURE — 99214 OFFICE O/P EST MOD 30 MIN: CPT | Performed by: INTERNAL MEDICINE

## 2018-04-06 ENCOUNTER — TELEPHONE (OUTPATIENT)
Dept: INTERNAL MEDICINE CLINIC | Age: 53
End: 2018-04-06

## 2018-04-11 ENCOUNTER — HOSPITAL ENCOUNTER (OUTPATIENT)
Dept: MAMMOGRAPHY | Age: 53
Discharge: HOME | End: 2018-04-11
Attending: INTERNAL MEDICINE | Admitting: INTERNAL MEDICINE

## 2018-04-11 DIAGNOSIS — Z12.31 VISIT FOR SCREENING MAMMOGRAM: ICD-10-CM

## 2018-05-29 ENCOUNTER — TELEPHONE (OUTPATIENT)
Dept: INTERNAL MEDICINE CLINIC | Age: 53
End: 2018-05-29

## 2018-05-29 DIAGNOSIS — I10 ESSENTIAL HYPERTENSION, BENIGN: ICD-10-CM

## 2018-05-29 RX ORDER — VALSARTAN AND HYDROCHLOROTHIAZIDE 320; 25 MG/1; MG/1
1 TABLET, FILM COATED ORAL DAILY
Qty: 30 TABLET | Refills: 5 | Status: SHIPPED | OUTPATIENT
Start: 2018-05-29 | End: 2018-11-26 | Stop reason: SDUPTHER

## 2018-06-04 DIAGNOSIS — I10 ESSENTIAL HYPERTENSION, BENIGN: ICD-10-CM

## 2018-06-04 RX ORDER — AMLODIPINE BESYLATE 10 MG/1
TABLET ORAL
Qty: 30 TABLET | Refills: 5 | Status: SHIPPED | OUTPATIENT
Start: 2018-06-04 | End: 2019-01-24 | Stop reason: SDUPTHER

## 2018-06-22 ENCOUNTER — CARE COORDINATION (OUTPATIENT)
Dept: INTERNAL MEDICINE CLINIC | Age: 53
End: 2018-06-22

## 2018-06-25 ENCOUNTER — CARE COORDINATION (OUTPATIENT)
Dept: CARE COORDINATION | Age: 53
End: 2018-06-25

## 2018-07-02 ENCOUNTER — CARE COORDINATION (OUTPATIENT)
Dept: CARE COORDINATION | Age: 53
End: 2018-07-02

## 2018-07-05 ENCOUNTER — OFFICE VISIT (OUTPATIENT)
Dept: INTERNAL MEDICINE CLINIC | Age: 53
End: 2018-07-05

## 2018-07-05 VITALS
BODY MASS INDEX: 50.94 KG/M2 | DIASTOLIC BLOOD PRESSURE: 84 MMHG | WEIGHT: 283 LBS | SYSTOLIC BLOOD PRESSURE: 150 MMHG | OXYGEN SATURATION: 96 % | HEART RATE: 86 BPM

## 2018-07-05 DIAGNOSIS — E78.5 HYPERLIPIDEMIA, UNSPECIFIED HYPERLIPIDEMIA TYPE: ICD-10-CM

## 2018-07-05 DIAGNOSIS — E11.29 TYPE 2 DIABETES MELLITUS WITH MICROALBUMINURIA, WITHOUT LONG-TERM CURRENT USE OF INSULIN (HCC): Primary | ICD-10-CM

## 2018-07-05 DIAGNOSIS — R80.9 TYPE 2 DIABETES MELLITUS WITH MICROALBUMINURIA, WITHOUT LONG-TERM CURRENT USE OF INSULIN (HCC): Primary | ICD-10-CM

## 2018-07-05 DIAGNOSIS — I10 ESSENTIAL HYPERTENSION, BENIGN: ICD-10-CM

## 2018-07-05 LAB — HBA1C MFR BLD: 8.2 %

## 2018-07-05 PROCEDURE — 99214 OFFICE O/P EST MOD 30 MIN: CPT | Performed by: INTERNAL MEDICINE

## 2018-07-05 PROCEDURE — 83036 HEMOGLOBIN GLYCOSYLATED A1C: CPT | Performed by: INTERNAL MEDICINE

## 2018-07-05 NOTE — PROGRESS NOTES
Hyperlipidemia, unspecified hyperlipidemia type  Patient is tolerating anti-lipid meds such as statin without complications, no myalgia. Preventive medicine: patient has had indicated immunizations. Orders Placed This Encounter   Procedures    POCT glycosylated hemoglobin (Hb A1C)       Prior to Admission medications    Medication Sig Start Date End Date Taking?  Authorizing Provider   Insulin Degludec 200 UNIT/ML SOPN Inject 15 Units into the skin every evening   Yes Historical Provider, MD   Liraglutide (VICTOZA) 18 MG/3ML SOPN SC injection Inject 1.2 mg into the skin daily 7/5/18  Yes Magdi Gan DO   atenolol (TENORMIN) 50 MG tablet TAKE ONE TABLET BY MOUTH DAILY 6/26/18  Yes Magdi Gan DO   ONE TOUCH ULTRA TEST strip USE TO TEST THREE TIMES A DAY 6/25/18  Yes Magdi Gan DO   amLODIPine (NORVASC) 10 MG tablet TAKE ONE TABLET BY MOUTH DAILY 6/4/18  Yes Magdi Gan DO   valsartan-hydrochlorothiazide (DIOVAN HCT) 320-25 MG per tablet Take 1 tablet by mouth daily 5/29/18  Yes Magdi Gan DO   aspirin 81 MG chewable tablet CHEW ONE TABLET BY MOUTH DAILY 3/6/18  Yes Magdi Gan DO   simvastatin (ZOCOR) 20 MG tablet TAKE ONE TABLET BY MOUTH EVERY NIGHT AT BEDTIME 3/6/18  Yes Magdi Gan DO   glimepiride (AMARYL) 4 MG tablet TAKE ONE TABLET BY MOUTH EVERY MORNING BEFORE BREAKFAST 1/18/18  Yes MD Nieves Eller LANCETS [id number] [facility] TEST THREE TIMES DAILY 9/21/17  Yes Karen Torres MD   metFORMIN (GLUCOPHAGE-XR) 500 MG extended release tablet TAKE FOUR TABLETS BY MOUTH DAILY WITH BREAKFAST  Patient taking differently: Take 1,000 mg by mouth 2 times daily (before meals) TAKE FOUR TABLETS BY MOUTH DAILY WITH BREAKFAST 9/21/17  Yes Karen Torres MD   potassium chloride (KLOR-CON M) 20 MEQ extended release tablet TAKE ONE TABLET BY MOUTH DAILY 9/21/17  Yes Karen Torres MD   albuterol sulfate HFA (PROVENTIL HFA) 108 (90 Base) MCG/ACT inhaler Inhale 2 puffs into the lungs every 6 hours as needed for Wheezing 17  Yes Rudolph Egan MD   Multiple Vitamins-Minerals (THERAPEUTIC MULTIVITAMIN-MINERALS) tablet Take 1 tablet by mouth daily   Yes Historical Provider, MD   pioglitazone (ACTOS) 15 MG tablet Take 1 tablet by mouth daily 18   Verlee Sicard, DO   fluticasone [facility]) 50 MCG/ACT nasal spray 1 spray by Nasal route daily 17   Rudolph Egan MD   fexofenadine [facility] ALLERGY) 180 MG tablet Take 1 tablet by mouth daily Start after completion of Allegra-D 17   Rudolph Egan MD   Compression Stockings MISC by Does not apply route Bilateral knee high stockings.   Size 20-30 17   Raulito Henson, APRN - CNP       Past Medical History:   Diagnosis Date    Asthma exacerbation 2016    Back pain     Diabetes mellitus ([address] 2014    TYPE 2    Epicondylitis, lateral (tennis elbow) 10/27/2014    Fibroid 2011    Hyperlipidemia     Hypertension     Iron deficiency anemia due to chronic blood loss 10/4/2016    Knee pain     ZA (obstructive sleep apnea) 3/1/2011    Sleep study done 2011     Vitamin D deficiency 2012       Past Surgical History:   Procedure Laterality Date     SECTION      x3    COLONOSCOPY      TUBAL LIGATION      BTL       Social History   Substance Use Topics    Smoking status: Never Smoker    Smokeless tobacco: Never Used      Comment: passive smoke exposure     Alcohol use 0.0 oz/week      Comment: occasionally        Family History   Problem Relation Age of Onset    Diabetes Mother         DM 2    Heart Attack Mother     Heart Disease Mother     High Blood Pressure Mother     Cancer Father         Lung    Stroke Father 48        smoker    No Known Problems Brother     Hypertension Sister     Hypertension Brother     No Known Problems Maternal Aunt     No Known Problems Maternal Uncle     No Known Problems Paternal

## 2018-07-05 NOTE — PATIENT INSTRUCTIONS
Patient Education        Home Blood Pressure Test: About This Test  What is it? A home blood pressure test allows you to keep track of your blood pressure at home. Blood pressure is a measure of the force of blood against the walls of your arteries. Blood pressure readings include two numbers, such as 130/80 (say \"130 over 80\"). The first number is the systolic pressure. The second number is the diastolic pressure. Why is this test done? You may do this test at home to:  · Find out if you have high blood pressure. · Track your blood pressure if you have high blood pressure. · Track how well medicine is working to reduce high blood pressure. · Check how lifestyle changes, such as weight loss and exercise, are affecting blood pressure. How can you prepare for the test?  · Do not use caffeine, tobacco, or medicines known to raise blood pressure (such as nasal decongestant sprays) for at least 30 minutes before taking your blood pressure. · Do not exercise for at least 30 minutes before taking your blood pressure. What happens before the test?  Take your blood pressure while you feel comfortable and relaxed. Sit quietly with both feet on the floor for at least 5 minutes before the test.  What happens during the test?  · Sit with your arm slightly bent and resting on a table so that your upper arm is at the same level as your heart. · Roll up your sleeve or take off your shirt to expose your upper arm. · Wrap the blood pressure cuff around your upper arm so that the lower edge of the cuff is about 1 inch above the bend of your elbow. Proceed with the following steps depending on if you are using an automatic or manual pressure monitor. Automatic blood pressure monitors  · Press the on/off button on the automatic monitor and wait until the ready-to-measure \"heart\" symbol appears next to zero in the display window. · Press the start button. The cuff will inflate and deflate by itself.   · Your blood pressure numbers will appear on the screen. · Write your numbers in your log book, along with the date and time. Manual blood pressure monitors  · Place the earpieces of a stethoscope in your ears, and place the bell of the stethoscope over the artery, just below the cuff. · Close the valve on the rubber inflating bulb. · Squeeze the bulb rapidly with your opposite hand to inflate the cuff until the dial or column of mercury reads about 30 mm Hg higher than your usual systolic pressure. If you do not know your usual pressure, inflate the cuff to 210 mm Hg or until the pulse at your wrist disappears. · Open the pressure valve just slightly by twisting or pressing the valve on the bulb. · As you watch the pressure slowly fall, note the level on the dial at which you first start to hear a pulsing or tapping sound through the stethoscope. This is your systolic blood pressure. · Continue letting the air out slowly. The sounds will become muffled and will finally disappear. Note the pressure when the sounds completely disappear. This is your diastolic blood pressure. Let out all the remaining air. · Write your numbers in your log book, along with the date and time. What else should you know about the test?  Here are the categories of blood pressure for adults:  Ideal blood pressure. Systolic is less than 038, and diastolic is less than 80. Elevated blood pressure. Systolic is 771 to 484, and diastolic is less than 80. High blood pressure (hypertension). Systolic is 972 or above. Diastolic is 80 or above. One or both numbers may be high. It is more accurate to take the average of several readings made throughout the day than to rely on a single reading. Follow-up care is a key part of your treatment and safety. Be sure to make and go to all appointments, and call your doctor if you are having problems. It's also a good idea to keep a list of the medicines you take. Where can you learn more?   Go to

## 2018-07-09 ENCOUNTER — CARE COORDINATION (OUTPATIENT)
Dept: CARE COORDINATION | Age: 53
End: 2018-07-09

## 2018-07-11 ENCOUNTER — CARE COORDINATION (OUTPATIENT)
Dept: INTERNAL MEDICINE CLINIC | Age: 53
End: 2018-07-11

## 2018-07-17 ENCOUNTER — CARE COORDINATION (OUTPATIENT)
Dept: CARE COORDINATION | Age: 53
End: 2018-07-17

## 2018-07-17 NOTE — CARE COORDINATION
RD outreach to remind patient of appt tomorrow 7/18/18 at 3:00 pm. LM and request for call back if r/s is needed.

## 2018-07-18 ENCOUNTER — CARE COORDINATION (OUTPATIENT)
Dept: CARE COORDINATION | Age: 53
End: 2018-07-18

## 2018-07-18 NOTE — CARE COORDINATION
Date    CREATININE 0.6 2018    BUN 12 2018     2018    K 4.3 2018    CL 94 (L) 2018    CO2 26 2018         Anthropometric Measurements:    Height: 5'2\" (62 in.)  Weight: 276 lbs. (125.5 kg)---> Did not weigh today, this weight is from 18  BMI: 50.5  IBW: 50.1 kg (110 lbs. )  %IBW: 150.5%  AdBW: 80.3 kg (177 lbs.)    Physical Exam Findings:    Deferred    Food and Nutrition History:    24-Hour Diet Recall    Breakfast  Time: 8:30 am  Consumed: [address] (two cheeseburgers or one breakfast sandwich), or skips    AM Snack  Time: N/A  Consumed: Chips    Lunch  Time: 1:45 pm  Consumed: Packs leftovers from night before    PM Snack  None    Dinner  Time: Tries to consume before 8 pm  Consumed: Spaghetti/ramen noodles/sometimes skips/sometimes just juice    Bedtime Snack  Time: N/A  Consumed: Chips/SF Ice Cream    Beverages: Water (~64 oz.), Juice (~64 oz.), Soda occasionally    Frequency of meals away from home: 2-3x weekly for breakfast and once every other week for dinner    Exercise: None    Blood Sugar Checks: occasionally--checked this morning at fastin mg/dL    Medications: Patient states she is taking her diabetes medications regularly now but was not in the past. She had a questions in regards to her insulin and she stated that she has only been taking 2 U in the evenings (This is because the box on the directions stated 2U to check if insulin was coming out of pen before giving self injection.) RD discussed that notes say 15 U and discussed how to properly give injection and how to use pen. Patient verbalized understanding. Summary of Appointment: Patient was seen today for diabetes education session. We discussed patient's current intake and eating habits (listed above in food recall). Patient states that she used to attend DM classes with her  but never paid attention during the class.  She also stated that her goals for today's session are to learn ([address] Equation). Estimated daily CHO Needs: 168 g (based on 45% total calorie intake)  Estimated daily Protein Needs: 100 g (based on 0.8 g/kg)  Estimated daily Fluid Needs: 3136 ml (103 oz.) (based on 25 mL/kg)    Intervention #1    Nutrition Counseling: Used open-ended questions to assess patients perceived susceptibility and severity of disease state. Discussed potential impact of health threat on patient's lifestyle. Used open-ended questions to assess patient's perception regarding benefits of and barriers to implementation of nutrition therapy. Goal(s): Patient will apply constructs discussed during nutrition counseling to initiate and encourage behavior change to positively affect disease state/nutrition status. Intervention #2    Nutrition Education: Clearly defined the benefits of nutrition therapy. Summarized and affirmed positive aspects of current nutrition patterns. Provided education regarding value of adherence to controlled CHO intake. Discussed ways to establish application of Plate Method meal planning efforts and discussed concepts of alternatives and choices regarding implementation of consistent CHO diet. Explored ideas for small, incremental goals to initiate behavior change. Goal(s) Patient will apply education to define small goals that will help to implement consistent CHO nutrition therapy. Nutrition Monitoring and Evaluation    Indicator/Goal Criteria   #1 CHO Count/protein #1 45 g CHO per meal/15 CHO per snack. Always pair CHO with protein at each meal and snack   #2 Juice Intake #2 Limit juice to only 2 glasses daily instead of 6 and replace with water   #3 PA #3 Walk 3x weekly for 30 minutes   #4 BS checks: Be more consistent with checking sugars (at least every morning at fasting and 2-hour after meal)    Follow Up:  In-person f/u appt Thursday, August 9 at 11:30 am.      Merlyn Rosado RDN, LD  Care Coordination Team Dietitian  [phone]

## 2018-07-19 ENCOUNTER — CARE COORDINATION (OUTPATIENT)
Dept: CARE COORDINATION | Age: 53
End: 2018-07-19

## 2018-07-27 RX ORDER — GLIMEPIRIDE 2 MG/1
TABLET ORAL
Qty: 30 TABLET | Refills: 4 | Status: SHIPPED | OUTPATIENT
Start: 2018-07-27 | End: 2019-01-24 | Stop reason: SDUPTHER

## 2018-07-27 RX ORDER — METFORMIN HYDROCHLORIDE 500 MG/1
TABLET, EXTENDED RELEASE ORAL
Qty: 120 TABLET | Refills: 4 | Status: SHIPPED | OUTPATIENT
Start: 2018-07-27 | End: 2019-01-24 | Stop reason: SDUPTHER

## 2018-08-08 ENCOUNTER — CARE COORDINATION (OUTPATIENT)
Dept: CARE COORDINATION | Age: 53
End: 2018-08-08

## 2018-08-08 NOTE — CARE COORDINATION
RD outreach to remind patient of appt tomorrow after PCP OV at 11:30 am. Patient confirmed appt. Will notify [facility].

## 2018-08-09 ENCOUNTER — OFFICE VISIT (OUTPATIENT)
Dept: GYNECOLOGY | Age: 53
End: 2018-08-09

## 2018-08-09 ENCOUNTER — CARE COORDINATION (OUTPATIENT)
Dept: CARE COORDINATION | Age: 53
End: 2018-08-09

## 2018-08-09 VITALS
BODY MASS INDEX: 49.99 KG/M2 | SYSTOLIC BLOOD PRESSURE: 127 MMHG | RESPIRATION RATE: 17 BRPM | WEIGHT: 282.13 LBS | HEIGHT: 63 IN | HEART RATE: 51 BPM | DIASTOLIC BLOOD PRESSURE: 75 MMHG

## 2018-08-09 DIAGNOSIS — Z01.419 WELL WOMAN EXAM WITH ROUTINE GYNECOLOGICAL EXAM: Primary | ICD-10-CM

## 2018-08-09 PROCEDURE — 99396 PREV VISIT EST AGE 40-64: CPT | Performed by: OBSTETRICS & GYNECOLOGY

## 2018-08-09 NOTE — CARE COORDINATION
Awilda Smith  2018    Registered Dietitian Progress Note for Care Coordination    Barriers to Meeting Goals: none    Action:  -Met with patient Face-to-Face at PCP office. Patient stated that she has been making healthy changes to her lifestyle. She recently joined a water challenge at work for 6 six weeks. She is to consume 64 oz. Water daily. This has helped her decrease juice and she stated that she is no longer consuming any juice. Her biggest challenge is exercise and is currently only moving while at work. She stated that she is going to start walking 3x weekly for 30 minutes. Her diet recall is below. She stated that she is watching carbohydrates and is staying under 45 g CHO per meal and 15 g CHO per snack. Educated patient on pairing protein with CHO at each meal and snack. Also discussed ways to reduce sodium and fat from diet. She is currently logging all blood sugars and is trying to be more consistent with BS checks. Numbers are below. Patient is happy with her progress and stated that she is going to continue to take each goal one at a time. All patient's concerns/questions were answered and patient verbalized understanding of all information discussed.     24-Hour Diet Recall    Breakfast  Consumed: English Muffin with egg, Monegasque smith, and cheese    AM Snack  Consumed: grapes/cucumber in pickle juice    Lunch  Consumed: BBQ pulled pork sandwich    PM Snack  Consumed: None    Dinner  Time: 8 pm  Consumed: grilled cheese    Beverages: Water (>64 oz.)    Blood sugar readings:  Today: Fastin mg/dL  Yesterday: Fastin, afternoon: 199 mg/dL (stated she ate too much and portion was big with unhealthy option)  8/3: 124 mg/dL  : 311 (consumed fish sandwich)  : Afternoon 88 mg/dL (three white castle sandwiches)  : 170 mg/dL fasting  : 172 Afternoon  : Fastin mg/dL, After Lunch: 255 mg/dL (did not say what she ate)  : Fastin mg/dL, noon: 151 mg/dL    Plan of Care:  -Increase exercise 3x weekly for 30 minutes  -Continue to log readings  -Consume protein at each meal and snack    Follow Up:  -Will do f/u face-to-face 10/5 before PCP OV at 2:30 pm. (A1C will be checked then)      Della Morales, [address] [place] [address],   Care Coordination Team Dietitian  [phone]

## 2018-08-12 ASSESSMENT — ENCOUNTER SYMPTOMS
EYES NEGATIVE: 1
RESPIRATORY NEGATIVE: 1
GASTROINTESTINAL NEGATIVE: 1

## 2018-08-13 LAB
HPV COMMENT: NORMAL
HPV TYPE 16: NOT DETECTED
HPV TYPE 18: NOT DETECTED
HPVOH (OTHER TYPES): NOT DETECTED

## 2018-08-13 NOTE — PROGRESS NOTES
Subjective:      Patient ID: Joi Juarez is a 47 y.o. female. Patient is here for annual. Patient with occasional vaginal irritation, none now. Irregular cycles-perimenopausal.         Review of Systems   Constitutional: Negative. HENT: Negative. Eyes: Negative. Respiratory: Negative. Cardiovascular: Negative. Gastrointestinal: Negative. Genitourinary: Negative. Musculoskeletal: Negative. Skin: Negative. Neurological: Negative. Psychiatric/Behavioral: Negative. Date of Birth 1965  Past Medical History:   Diagnosis Date    Asthma exacerbation 2016    Back pain     Diabetes mellitus ([address] 2014    TYPE 2    Epicondylitis, lateral (tennis elbow) 10/27/2014    Fibroid 2011    Hyperlipidemia     Hypertension     Iron deficiency anemia due to chronic blood loss 10/4/2016    Knee pain     Menorrhagia     ZA (obstructive sleep apnea) 3/1/2011    Sleep study done 2011     Vitamin D deficiency 2012     Past Surgical History:   Procedure Laterality Date     SECTION      x3    COLONOSCOPY      TUBAL LIGATION      BTL     OB History    Para Term  AB Living   3 3 3     3   SAB TAB Ectopic Molar Multiple Live Births             3      # Outcome Date GA Lbr Alexey/2nd Weight Sex Delivery Anes PTL Lv   3 Term 200 37w0d   M CS-Unspec   ROSELIA   2 Term 80 37w0d   F CS-Unspec   ROSELIA   1 Term 1986 40w0d   M CS-Unspec   ROSELIA        Social History     Social History    Marital status:      Spouse name: Serafin Hung Number of children: 3    Years of education: N/A     Occupational History          fulltime.  People Working cooperatively     Social History Main Topics    Smoking status: Never Smoker    Smokeless tobacco: Never Used      Comment: passive smoke exposure     Alcohol use 0.0 oz/week      Comment: occasionally     Drug use: No    Sexual activity: Yes     Partners: Male     Other Topics Concern    Not on file     Social History Narrative    No narrative on file     Allergies   Allergen Reactions    Prednisone Other (See Comments)     Made her feel loopy     Outpatient Prescriptions Marked as Taking for the 8/9/18 encounter (Office Visit) with Reba Coats MD   Medication Sig Dispense Refill    metFORMIN (GLUCOPHAGE-XR) 500 MG extended release tablet TAKE FOUR TABLETS BY MOUTH DAILY WITH BREAKFAST 120 tablet 4    glimepiride (AMARYL) 2 MG tablet TAKE ONE TABLET BY MOUTH EVERY MORNING BEFORE BREAKFAST 30 tablet 4    Insulin Degludec 200 UNIT/ML SOPN Inject 15 Units into the skin every evening      atenolol (TENORMIN) 50 MG tablet TAKE ONE TABLET BY MOUTH DAILY 30 tablet 2    ONE TOUCH ULTRA TEST strip USE TO TEST THREE TIMES A DAY 50 strip 3    amLODIPine (NORVASC) 10 MG tablet TAKE ONE TABLET BY MOUTH DAILY 30 tablet 5    valsartan-hydrochlorothiazide (DIOVAN HCT) 320-25 MG per tablet Take 1 tablet by mouth daily 30 tablet 5    aspirin 81 MG chewable tablet CHEW ONE TABLET BY MOUTH DAILY 30 tablet 9    simvastatin (ZOCOR) 20 MG tablet TAKE ONE TABLET BY MOUTH EVERY NIGHT AT BEDTIME 30 tablet 8    pioglitazone (ACTOS) 15 MG tablet Take 1 tablet by mouth daily 30 tablet 2    ONETOUCH DELICA LANCETS 98Z MISC TEST THREE TIMES DAILY 100 each 5    fluticasone (FLONASE) 50 MCG/ACT nasal spray 1 spray by Nasal route daily 1 Bottle 5    fexofenadine (ALLEGRA ALLERGY) 180 MG tablet Take 1 tablet by mouth daily Start after completion of Allegra-D 30 tablet 5    potassium chloride (KLOR-CON M) 20 MEQ extended release tablet TAKE ONE TABLET BY MOUTH DAILY 30 tablet 5    albuterol sulfate HFA (PROVENTIL HFA) 108 (90 Base) MCG/ACT inhaler Inhale 2 puffs into the lungs every 6 hours as needed for Wheezing 1 Inhaler 5    Compression Stockings MISC by Does not apply route Bilateral knee high stockings.   Size 20-30 1 each 0    Multiple Vitamins-Minerals (THERAPEUTIC MULTIVITAMIN-MINERALS) tablet Take 1 tablet by mouth daily       Family History   Problem Relation Age of Onset    Diabetes Mother         DM 2    Heart Attack Mother     Heart Disease Mother     High Blood Pressure Mother     Cancer Father         Lung    Stroke Father 48        smoker    No Known Problems Brother     Hypertension Sister     Hypertension Brother     No Known Problems Maternal Aunt     No Known Problems Maternal Uncle     No Known Problems Paternal Aunt     No Known Problems Paternal Uncle     No Known Problems Maternal Grandmother     No Known Problems Maternal Grandfather     No Known Problems Paternal Grandmother     No Known Problems Paternal Grandfather     No Known Problems Other     Rheum Arthritis Neg Hx     Osteoarthritis Neg Hx     Asthma Neg Hx     Breast Cancer Neg Hx     Heart Failure Neg Hx     High Cholesterol Neg Hx     Migraines Neg Hx     Ovarian Cancer Neg Hx     Rashes/Skin Problems Neg Hx     Seizures Neg Hx     Thyroid Disease Neg Hx      /75 (Site: Left Arm, Position: Sitting, Cuff Size: Large Adult)   Pulse 51   Resp 17   Ht 5' 3\" (1.6 m)   Wt 282 lb 2 oz (128 kg)   Breastfeeding? No   BMI 49.98 kg/m²       Objective:   Physical Exam   Constitutional: She is oriented to person, place, and time. She appears well-developed and well-nourished. No distress. HENT:   Head: Normocephalic and atraumatic. Eyes: Pupils are equal, round, and reactive to light. EOM are normal.   Neck: Normal range of motion. Neck supple. No thyromegaly present. Cardiovascular: Normal rate, regular rhythm and normal heart sounds. Exam reveals no gallop and no friction rub. No murmur heard. Pulmonary/Chest: Effort normal and breath sounds normal. No respiratory distress. She has no wheezes. She has no rales. Abdominal: Soft. Bowel sounds are normal. She exhibits no distension and no mass. There is no hepatomegaly. There is no tenderness. There is no rebound and no guarding. No hernia.

## 2018-08-26 DIAGNOSIS — E87.6 HYPOKALEMIA: ICD-10-CM

## 2018-08-27 RX ORDER — POTASSIUM CHLORIDE 20 MEQ/1
TABLET, EXTENDED RELEASE ORAL
Qty: 30 TABLET | Refills: 5 | Status: SHIPPED | OUTPATIENT
Start: 2018-08-27 | End: 2018-09-05 | Stop reason: SDUPTHER

## 2018-09-04 DIAGNOSIS — E87.6 HYPOKALEMIA: ICD-10-CM

## 2018-09-05 RX ORDER — POTASSIUM CHLORIDE 20 MEQ/1
TABLET, EXTENDED RELEASE ORAL
Qty: 30 TABLET | Refills: 2 | Status: SHIPPED | OUTPATIENT
Start: 2018-09-05 | End: 2019-05-06 | Stop reason: ALTCHOICE

## 2018-09-30 DIAGNOSIS — I10 ESSENTIAL HYPERTENSION, BENIGN: ICD-10-CM

## 2018-10-01 ENCOUNTER — CARE COORDINATION (OUTPATIENT)
Dept: CARE COORDINATION | Age: 53
End: 2018-10-01

## 2018-10-01 RX ORDER — ATENOLOL 50 MG/1
TABLET ORAL
Qty: 30 TABLET | Refills: 5 | Status: SHIPPED | OUTPATIENT
Start: 2018-10-01 | End: 2019-05-31 | Stop reason: SDUPTHER

## 2018-10-01 NOTE — CARE COORDINATION
Call to patient. LM on VM w [facility] name and cell to please call    Patient met w RD on 8/9.  Follow up re changes / goals    Lab Results   Component Value Date    LABA1C 8.2 07/05/2018     Lab Results   Component Value Date    .3 04/05/2018       Future Appointments  Date Time Provider Thu Yanez   10/5/2018 3:00 PM Yusef King DO [place] [id number] [facility]     PLan  Upcoming appt

## 2018-10-03 ENCOUNTER — CARE COORDINATION (OUTPATIENT)
Dept: CARE COORDINATION | Age: 53
End: 2018-10-03

## 2018-10-05 ENCOUNTER — OFFICE VISIT (OUTPATIENT)
Dept: INTERNAL MEDICINE CLINIC | Age: 53
End: 2018-10-05
Payer: COMMERCIAL

## 2018-10-05 ENCOUNTER — CARE COORDINATION (OUTPATIENT)
Dept: CARE COORDINATION | Age: 53
End: 2018-10-05

## 2018-10-05 VITALS
SYSTOLIC BLOOD PRESSURE: 142 MMHG | WEIGHT: 284.2 LBS | HEIGHT: 63 IN | HEART RATE: 71 BPM | RESPIRATION RATE: 14 BRPM | BODY MASS INDEX: 50.36 KG/M2 | OXYGEN SATURATION: 96 % | DIASTOLIC BLOOD PRESSURE: 80 MMHG

## 2018-10-05 DIAGNOSIS — M72.2 PLANTAR FASCIITIS OF LEFT FOOT: ICD-10-CM

## 2018-10-05 DIAGNOSIS — E11.29 TYPE 2 DIABETES MELLITUS WITH MICROALBUMINURIA, WITHOUT LONG-TERM CURRENT USE OF INSULIN (HCC): Primary | ICD-10-CM

## 2018-10-05 DIAGNOSIS — E66.01 CLASS 3 SEVERE OBESITY DUE TO EXCESS CALORIES WITH SERIOUS COMORBIDITY AND BODY MASS INDEX (BMI) OF 50.0 TO 59.9 IN ADULT (HCC): ICD-10-CM

## 2018-10-05 DIAGNOSIS — I10 ESSENTIAL HYPERTENSION, BENIGN: ICD-10-CM

## 2018-10-05 DIAGNOSIS — E11.29 TYPE 2 DIABETES MELLITUS WITH MICROALBUMINURIA, WITHOUT LONG-TERM CURRENT USE OF INSULIN (HCC): ICD-10-CM

## 2018-10-05 DIAGNOSIS — R80.9 TYPE 2 DIABETES MELLITUS WITH MICROALBUMINURIA, WITHOUT LONG-TERM CURRENT USE OF INSULIN (HCC): ICD-10-CM

## 2018-10-05 DIAGNOSIS — R80.9 TYPE 2 DIABETES MELLITUS WITH MICROALBUMINURIA, WITHOUT LONG-TERM CURRENT USE OF INSULIN (HCC): Primary | ICD-10-CM

## 2018-10-05 DIAGNOSIS — E78.5 HYPERLIPIDEMIA, UNSPECIFIED HYPERLIPIDEMIA TYPE: ICD-10-CM

## 2018-10-05 LAB
A/G RATIO: 1.1 (ref 1.1–2.2)
ALBUMIN SERPL-MCNC: 4.3 G/DL (ref 3.4–5)
ALP BLD-CCNC: 72 U/L (ref 40–129)
ALT SERPL-CCNC: 14 U/L (ref 10–40)
ANION GAP SERPL CALCULATED.3IONS-SCNC: 16 MMOL/L (ref 3–16)
AST SERPL-CCNC: 14 U/L (ref 15–37)
BILIRUB SERPL-MCNC: <0.2 MG/DL (ref 0–1)
BUN BLDV-MCNC: 16 MG/DL (ref 7–20)
CALCIUM SERPL-MCNC: 9.9 MG/DL (ref 8.3–10.6)
CHLORIDE BLD-SCNC: 96 MMOL/L (ref 99–110)
CO2: 28 MMOL/L (ref 21–32)
CREAT SERPL-MCNC: 0.7 MG/DL (ref 0.6–1.1)
GFR AFRICAN AMERICAN: >60
GFR NON-AFRICAN AMERICAN: >60
GLOBULIN: 4 G/DL
GLUCOSE BLD-MCNC: 136 MG/DL (ref 70–99)
POTASSIUM SERPL-SCNC: 4 MMOL/L (ref 3.5–5.1)
SODIUM BLD-SCNC: 140 MMOL/L (ref 136–145)
TOTAL PROTEIN: 8.3 G/DL (ref 6.4–8.2)

## 2018-10-05 PROCEDURE — 99214 OFFICE O/P EST MOD 30 MIN: CPT | Performed by: INTERNAL MEDICINE

## 2018-10-05 NOTE — CARE COORDINATION
blood sugar check #2 keep log of readings #2 continues to do so, however lost meter this week and has not checked in a couple of days   #3  Protein Intake #3 Consume at each meal and snack #3 in progress, incorporate more nuts at snack time       Plan of Care:  - Aim for 6,000 steps daily  - Get new meter and continue to check sugars  - Pair CHO with protein at meals and snacks    Follow Up:    -will call in two weeks.       Nai Flores RDN, LD  Care Coordination Team Dietitian  [phone]

## 2018-10-05 NOTE — PROGRESS NOTES
and all orders for this visit:    Type 2 diabetes mellitus with microalbuminuria, without long-term current use of insulin (HCC)  On actos, stat, ARB, sulfonylurea, metformin, uncontrolled, last A1c was 8.2%, will recheck value, unfortunately has not lost weight  -     COMPREHENSIVE METABOLIC PANEL; Future  -     HEMOGLOBIN A1C; Future  -     Diabetic Foot Exam    Hyperlipidemia, unspecified hyperlipidemia type  Patient is tolerating anti-lipid meds such as statin without complications, no myalgia. Essential hypertension, benign  Controlled overall, continue meds, follow renal function     Plantar fasciitis of left foot  Given stretches PDF    Class 3 obesity (BMI > 50)  We discussed the option of weight reduction, have elected on considering weight management programs. Other orders  -     Blood Glucose Monitoring Suppl (ACURA BLOOD GLUCOSE METER) w/Device KIT; Inject 1 Applicatorful into the skin 2 times daily (before meals)    Preventive medicine: patient has has not yet had indicated immunizations. She will get flu shot @ work, mammogram due 4/2019, Cscope due 2022, evidence of fragments of tubular adenoma. Orders Placed This Encounter   Procedures    COMPREHENSIVE METABOLIC PANEL    HEMOGLOBIN A1C    Diabetic Foot Exam       Prior to Admission medications    Medication Sig Start Date End Date Taking?  Authorizing Provider   Blood Glucose Monitoring Suppl [facility] BLOOD GLUCOSE METER) w/Device KIT Inject 1 Applicatorful into the skin 2 times daily (before meals) 10/5/18  Yes Yusef Sale, DO   atenolol (TENORMIN) 50 MG tablet TAKE ONE TABLET BY MOUTH DAILY 10/1/18  Yes Yusef Sale, DO   potassium chloride (KLOR-CON M) 20 MEQ extended release tablet TAKE ONE TABLET BY MOUTH DAILY 9/5/18  Yes Yusef Sale, DO   metFORMIN (GLUCOPHAGE-XR) 500 MG extended release tablet TAKE FOUR TABLETS BY MOUTH DAILY WITH BREAKFAST 7/27/18  Yes Yusef Sale, DO   glimepiride (AMARYL) 2 MG tablet TAKE ONE TABLET BY MOUTH EVERY MORNING BEFORE BREAKFAST 7/27/18  Yes Rose Vizcaino DO   Insulin Degludec 200 UNIT/ML SOPN Inject 15 Units into the skin every evening   Yes Historical Provider, MD   ONE TOUCH ULTRA TEST strip USE TO TEST THREE TIMES A DAY 6/25/18  Yes Rose Vizcaino DO   amLODIPine (NORVASC) 10 MG tablet TAKE ONE TABLET BY MOUTH DAILY  Patient taking differently: 5 mg TAKE ONE TABLET BY MOUTH DAILY 6/4/18  Yes Rose Vizcaino DO   valsartan-hydrochlorothiazide (DIOVAN HCT) 320-25 MG per tablet Take 1 tablet by mouth daily 5/29/18  Yes Rose Vizcaino DO   aspirin 81 MG chewable tablet CHEW ONE TABLET BY MOUTH DAILY 3/6/18  Yes Rose Vizcaino DO   simvastatin (ZOCOR) 20 MG tablet TAKE ONE TABLET BY MOUTH EVERY NIGHT AT BEDTIME 3/6/18  Yes Rose Vizcaino DO   pioglitazone (ACTOS) 15 MG tablet Take 1 tablet by mouth daily 2/21/18  Yes DO CATIA LozoyaTOUCH DELICA LANCETS 40U MISC TEST THREE TIMES DAILY 9/21/17  Yes Hawk Ann MD   fexofenadine [facility] ALLERGY) 180 MG tablet Take 1 tablet by mouth daily Start after completion of Allegra-D 9/21/17  Yes Hawk Ann MD   albuterol sulfate HFA (PROVENTIL HFA) 108 (90 Base) MCG/ACT inhaler Inhale 2 puffs into the lungs every 6 hours as needed for Wheezing 9/21/17  Yes Hawk Ann MD   Multiple Vitamins-Minerals (THERAPEUTIC MULTIVITAMIN-MINERALS) tablet Take 1 tablet by mouth daily   Yes Historical Provider, MD   fluticasone [facility]) 50 MCG/ACT nasal spray 1 spray by Nasal route daily 9/21/17   Hawk Ann MD       Past Medical History:   Diagnosis Date    Asthma exacerbation 6/2/2016    Back pain     Diabetes mellitus ([address] 7/29/2014    TYPE 2    Epicondylitis, lateral (tennis elbow) 10/27/2014    Fibroid 1/2011    Hyperlipidemia     Hypertension     Iron deficiency anemia due to chronic blood loss 10/4/2016    Knee pain     Menorrhagia     ZA (obstructive sleep apnea) 3/1/2011    Sleep study done 2/25/2011    [place]

## 2018-10-05 NOTE — PATIENT INSTRUCTIONS
Patient Education            Current as of: April 3, 2017  Content Version: 11.7  © 3039-2199 [employer]. Care instructions adapted under license by [facility] ([facility]). If you have questions about a medical condition or this instruction, always ask your healthcare professional. [address] any warranty or liability for your use of this information.

## 2018-10-06 LAB
ESTIMATED AVERAGE GLUCOSE: 200.1 MG/DL
HBA1C MFR BLD: 8.6 %

## 2018-10-09 ENCOUNTER — TELEPHONE (OUTPATIENT)
Dept: INTERNAL MEDICINE CLINIC | Age: 53
End: 2018-10-09

## 2018-10-10 ENCOUNTER — TELEPHONE (OUTPATIENT)
Dept: INTERNAL MEDICINE CLINIC | Age: 53
End: 2018-10-10

## 2018-10-10 NOTE — TELEPHONE ENCOUNTER
Patient called at 12:00 because she just picked up her Byduren earlier and she wants to confirm what the correct dosage is. Should she go with instructions on the box? Please call at number provided to discuss/clarify. Thanks.

## 2018-10-17 ENCOUNTER — CARE COORDINATION (OUTPATIENT)
Dept: CARE COORDINATION | Age: 53
End: 2018-10-17

## 2018-10-17 NOTE — CARE COORDINATION
Lisandro Or  10/17/2018    Registered Dietitian Progress Note for Care Coordination    Assessment: Eliseo Moncada is a 47 y.o. female. Barriers to meeting goals: lack of motivation    Action:  - Spoke with patient over the phone regarding f/u from appointment two weeks ago. Patient had A1C checked after appointment it went increased from 8.2% to 8.6%. She had not been checking BS readings d/t broken meter. However, patient stated that she got a new meter and checked this morning and it was 187 mg/dL. She plans to check again later in the day. She started counting CHO in certain foods and sticking to 15 g CHO per snack. She hasn't increased steps but plans to start incorporating more activity throughout the day. Patient also continues to consume >64 oz. Water daily. Discussed tips for proper portion control and continuation of label reading for CHO intake. All patient's concerns/questions were answered and patient verbalized understanding of all information discussed. Nutrition Monitoring and Evaluation  Indicator/Goal Criteria Progress   #1 PA #1 Aim for 6,000 steps daily #1 Still averaging around 3,500 steps daily   #2 BS checks #2 get new meter to start checking sugars regularly #2 got new meter, this morning was 187 mg/dL   #3  CHO/Protein Intake #3 Pair CHO with protein at each meal and snack #3 Focusing on CHO intake and slowly incorporating protein to each meal/snack       Plan of Care:  - Increase steps daily to 6,000  - Pair CHO with protein at each meal and snack  - Follow MyPlate guidelines for proper portion control    Follow Up:    -Will call in one month. Will notify [facility] of update.       Noe Reeves RDN, LD  Care Coordination Team Dietitian  [phone]

## 2018-10-19 ENCOUNTER — NURSE ONLY (OUTPATIENT)
Dept: INTERNAL MEDICINE CLINIC | Age: 53
End: 2018-10-19

## 2018-10-19 DIAGNOSIS — E11.8 TYPE 2 DIABETES MELLITUS WITH COMPLICATION, WITHOUT LONG-TERM CURRENT USE OF INSULIN (HCC): Primary | ICD-10-CM

## 2018-11-19 ENCOUNTER — CARE COORDINATION (OUTPATIENT)
Dept: CARE COORDINATION | Age: 53
End: 2018-11-19

## 2018-11-26 DIAGNOSIS — I10 ESSENTIAL HYPERTENSION, BENIGN: ICD-10-CM

## 2018-11-27 RX ORDER — VALSARTAN AND HYDROCHLOROTHIAZIDE 320; 25 MG/1; MG/1
TABLET, FILM COATED ORAL
Qty: 30 TABLET | Refills: 4 | Status: SHIPPED | OUTPATIENT
Start: 2018-11-27 | End: 2019-01-07 | Stop reason: ALTCHOICE

## 2018-11-28 ENCOUNTER — TELEPHONE (OUTPATIENT)
Dept: BARIATRICS/WEIGHT MGMT | Age: 53
End: 2018-11-28

## 2018-12-04 ENCOUNTER — CARE COORDINATION (OUTPATIENT)
Dept: INTERNAL MEDICINE CLINIC | Age: 53
End: 2018-12-04

## 2019-01-02 RX ORDER — LANCETS 33 GAUGE
EACH MISCELLANEOUS
Qty: 100 EACH | Refills: 10 | Status: SHIPPED | OUTPATIENT
Start: 2019-01-02 | End: 2020-04-13 | Stop reason: SDUPTHER

## 2019-01-07 ENCOUNTER — OFFICE VISIT (OUTPATIENT)
Dept: INTERNAL MEDICINE CLINIC | Age: 54
End: 2019-01-07
Payer: COMMERCIAL

## 2019-01-07 VITALS
BODY MASS INDEX: 50.79 KG/M2 | RESPIRATION RATE: 16 BRPM | SYSTOLIC BLOOD PRESSURE: 138 MMHG | HEIGHT: 62 IN | DIASTOLIC BLOOD PRESSURE: 86 MMHG | HEART RATE: 64 BPM | TEMPERATURE: 98.5 F | WEIGHT: 276 LBS

## 2019-01-07 DIAGNOSIS — E78.5 HYPERLIPIDEMIA, UNSPECIFIED HYPERLIPIDEMIA TYPE: ICD-10-CM

## 2019-01-07 DIAGNOSIS — R80.9 TYPE 2 DIABETES MELLITUS WITH MICROALBUMINURIA, WITHOUT LONG-TERM CURRENT USE OF INSULIN (HCC): Primary | ICD-10-CM

## 2019-01-07 DIAGNOSIS — E66.01 CLASS 3 SEVERE OBESITY DUE TO EXCESS CALORIES WITH SERIOUS COMORBIDITY AND BODY MASS INDEX (BMI) OF 50.0 TO 59.9 IN ADULT (HCC): ICD-10-CM

## 2019-01-07 DIAGNOSIS — J45.20 MILD INTERMITTENT ASTHMA WITHOUT COMPLICATION: ICD-10-CM

## 2019-01-07 DIAGNOSIS — I10 ESSENTIAL HYPERTENSION, BENIGN: ICD-10-CM

## 2019-01-07 DIAGNOSIS — E11.29 TYPE 2 DIABETES MELLITUS WITH MICROALBUMINURIA, WITHOUT LONG-TERM CURRENT USE OF INSULIN (HCC): Primary | ICD-10-CM

## 2019-01-07 LAB — HBA1C MFR BLD: 8 %

## 2019-01-07 PROCEDURE — 99214 OFFICE O/P EST MOD 30 MIN: CPT | Performed by: INTERNAL MEDICINE

## 2019-01-07 PROCEDURE — 83036 HEMOGLOBIN GLYCOSYLATED A1C: CPT | Performed by: INTERNAL MEDICINE

## 2019-01-07 RX ORDER — LOSARTAN POTASSIUM AND HYDROCHLOROTHIAZIDE 12.5; 1 MG/1; MG/1
1 TABLET ORAL DAILY
Qty: 30 TABLET | Refills: 5 | Status: SHIPPED | OUTPATIENT
Start: 2019-01-07 | End: 2019-05-06 | Stop reason: ALTCHOICE

## 2019-01-07 ASSESSMENT — PATIENT HEALTH QUESTIONNAIRE - PHQ9
SUM OF ALL RESPONSES TO PHQ QUESTIONS 1-9: 0
1. LITTLE INTEREST OR PLEASURE IN DOING THINGS: 0
2. FEELING DOWN, DEPRESSED OR HOPELESS: 0
SUM OF ALL RESPONSES TO PHQ QUESTIONS 1-9: 0
SUM OF ALL RESPONSES TO PHQ9 QUESTIONS 1 & 2: 0

## 2019-01-24 DIAGNOSIS — I10 ESSENTIAL HYPERTENSION, BENIGN: ICD-10-CM

## 2019-01-29 RX ORDER — GLIMEPIRIDE 2 MG/1
TABLET ORAL
Qty: 30 TABLET | Refills: 3 | Status: SHIPPED | OUTPATIENT
Start: 2019-01-29 | End: 2019-01-31 | Stop reason: DRUGHIGH

## 2019-01-29 RX ORDER — METFORMIN HYDROCHLORIDE 500 MG/1
TABLET, EXTENDED RELEASE ORAL
Qty: 120 TABLET | Refills: 3 | Status: SHIPPED | OUTPATIENT
Start: 2019-01-29 | End: 2019-08-30 | Stop reason: SDUPTHER

## 2019-01-29 RX ORDER — AMLODIPINE BESYLATE 10 MG/1
TABLET ORAL
Qty: 30 TABLET | Refills: 4 | Status: SHIPPED | OUTPATIENT
Start: 2019-01-29 | End: 2019-08-30 | Stop reason: SDUPTHER

## 2019-01-31 ENCOUNTER — CARE COORDINATION (OUTPATIENT)
Dept: CARE COORDINATION | Age: 54
End: 2019-01-31

## 2019-01-31 RX ORDER — GLIMEPIRIDE 4 MG/1
4 TABLET ORAL
COMMUNITY
End: 2019-07-04 | Stop reason: SDUPTHER

## 2019-04-02 ENCOUNTER — CARE COORDINATION (OUTPATIENT)
Dept: INTERNAL MEDICINE CLINIC | Age: 54
End: 2019-04-02

## 2019-05-06 ENCOUNTER — OFFICE VISIT (OUTPATIENT)
Dept: INTERNAL MEDICINE CLINIC | Age: 54
End: 2019-05-06
Payer: COMMERCIAL

## 2019-05-06 VITALS
TEMPERATURE: 98.5 F | SYSTOLIC BLOOD PRESSURE: 138 MMHG | WEIGHT: 280 LBS | HEIGHT: 62 IN | HEART RATE: 60 BPM | BODY MASS INDEX: 51.53 KG/M2 | RESPIRATION RATE: 16 BRPM | OXYGEN SATURATION: 98 % | DIASTOLIC BLOOD PRESSURE: 80 MMHG

## 2019-05-06 DIAGNOSIS — I10 ESSENTIAL HYPERTENSION, BENIGN: ICD-10-CM

## 2019-05-06 DIAGNOSIS — R80.9 TYPE 2 DIABETES MELLITUS WITH MICROALBUMINURIA, WITHOUT LONG-TERM CURRENT USE OF INSULIN (HCC): Primary | ICD-10-CM

## 2019-05-06 DIAGNOSIS — E11.29 TYPE 2 DIABETES MELLITUS WITH MICROALBUMINURIA, WITHOUT LONG-TERM CURRENT USE OF INSULIN (HCC): Primary | ICD-10-CM

## 2019-05-06 DIAGNOSIS — E66.01 CLASS 3 SEVERE OBESITY DUE TO EXCESS CALORIES WITH SERIOUS COMORBIDITY AND BODY MASS INDEX (BMI) OF 50.0 TO 59.9 IN ADULT (HCC): ICD-10-CM

## 2019-05-06 DIAGNOSIS — J45.20 MILD INTERMITTENT ASTHMA WITHOUT COMPLICATION: ICD-10-CM

## 2019-05-06 DIAGNOSIS — R20.2 PARESTHESIA: ICD-10-CM

## 2019-05-06 PROCEDURE — 99214 OFFICE O/P EST MOD 30 MIN: CPT | Performed by: INTERNAL MEDICINE

## 2019-05-06 RX ORDER — VALSARTAN AND HYDROCHLOROTHIAZIDE 320; 25 MG/1; MG/1
1 TABLET, FILM COATED ORAL DAILY
COMMUNITY
End: 2019-08-30 | Stop reason: SDUPTHER

## 2019-05-06 RX ORDER — REPAGLINIDE 2 MG/1
2 TABLET ORAL
Qty: 90 TABLET | Refills: 3 | Status: SHIPPED | OUTPATIENT
Start: 2019-05-06 | End: 2019-10-03

## 2019-05-06 SDOH — HEALTH STABILITY: MENTAL HEALTH: HOW MANY STANDARD DRINKS CONTAINING ALCOHOL DO YOU HAVE ON A TYPICAL DAY?: 1 OR 2

## 2019-05-06 SDOH — HEALTH STABILITY: MENTAL HEALTH: HOW OFTEN DO YOU HAVE A DRINK CONTAINING ALCOHOL?: 2-4 TIMES A MONTH

## 2019-05-06 ASSESSMENT — PATIENT HEALTH QUESTIONNAIRE - PHQ9
SUM OF ALL RESPONSES TO PHQ9 QUESTIONS 1 & 2: 0
SUM OF ALL RESPONSES TO PHQ QUESTIONS 1-9: 0
SUM OF ALL RESPONSES TO PHQ QUESTIONS 1-9: 0
2. FEELING DOWN, DEPRESSED OR HOPELESS: 0
1. LITTLE INTEREST OR PLEASURE IN DOING THINGS: 0

## 2019-05-06 NOTE — PROGRESS NOTES
PROGRESS NOTE:    Trista Contreras    5/6/2019    Chief Complaint   Patient presents with    Follow-up     Intermittent numbness of left thigh. HPI:    Mr(s)Kenneth Contreras presents to clinic today with issues noted above. She is having left sided lateral leg \"numbness\" as she stated, it comes sporadically, sitting seems worse. Unfortunately gained 4lbs back. Been more tired than usual.    Patient is taking BP medications at home without side effects, BP is not being checked at home. Patient is tolerating anti-lipid meds such as statin without complications, no myalgia. Patient is taking DM meds as prescribed and is not checking sugars at home, denies hypoglycemia. Did not bring bottles today, she is not taking januvia/bydureon due to cost, all requires PA. Patient denies chest pain, SOB, NVD, FC, rash, malaise, rigor, dizziness/lightheadness, other pertinent ROS was also reviewed. /80 (Site: Right Upper Arm, Position: Sitting, Cuff Size: Large Adult)   Pulse 60   Temp 98.5 °F (36.9 °C) (Oral)   Resp 16   Ht 5' 2\" (1.575 m)   Wt 280 lb (127 kg)   SpO2 98% Comment: Room Air  BMI 51.21 kg/m²   Body mass index is 51.21 kg/m². Allergies   Allergen Reactions    Prednisone Other (See Comments)     Made her feel loopy     Physical Exam:    Gen: Patient appears well groomed, well appearing  HEAD: Atraumatic, normocephalic,   Eyes: PERRLA, EOMI   Neck: supple, no thyroid nodule appreciated, no JVD  Chest: Clear to auscultation BRIDGET, unlabored breathing, normal expansion  Heart: Regular rate, regular rhythm, no murmur, no rub  Abdomen: Non-tender, non-distended, bowel sounds present x3  Extremities: +1/4 pitting edema up to mid shins BRIDGET, distal pulses intact  Patient was alert and oriented to person, place and time    Assessment and Plan:    Sierra Aggarwal was seen today for follow-up.     Diagnoses and all orders for this visit:    Obesity class 3  Refer to weight mgmt solutions, she cannot do it alone and needs a structured system, states her previous experience with [employer] did not help    Type 2 diabetes mellitus with microalbuminuria, without long-term current use of insulin (HCC)  Uncontrolled, she was no on glutides or DPPV4's, added Nesina and also try PA for Victoza, also added Tradjenta samples, consider weight mgmt solutions @ [place]  -     HEMOGLOBIN A1C; Future  -     COMPREHENSIVE METABOLIC PANEL; Future  -     HDL CHOLESTEROL; Future  -     LDL CHOLESTEROL, DIRECT; Future    Mild intermittent asthma without complication  Only uses albuterol only PRN    Essential hypertension, benign  Controlled overall, continue meds, follow renal function     Paresthesia  Likely sciatica, given stretches PDF trial    Other orders  -     Liraglutide (VICTOZA) 18 MG/3ML SOPN SC injection; Inject 1.2 mg into the skin daily  -     repaglinide (PRANDIN) 2 MG tablet; Take 1 tablet by mouth 3 times daily (before meals)    Preventive medicine: patient has indicated immunizations. Had shingrix, need records, had fluzone, mammogram due 4/2019.   cscope due 2022. Orders Placed This Encounter   Procedures    HEMOGLOBIN A1C    COMPREHENSIVE METABOLIC PANEL    HDL CHOLESTEROL    LDL CHOLESTEROL, DIRECT       Prior to Admission medications    Medication Sig Start Date End Date Taking?  Authorizing Provider   valsartan-hydrochlorothiazide (DIOVAN-HCT) 320-25 MG per tablet Take 1 tablet by mouth daily   Yes Historical Provider, MD   Liraglutide (VICTOZA) 18 MG/3ML SOPN SC injection Inject 1.2 mg into the skin daily 5/6/19  Yes Dennise Berry, DO   repaglinide (PRANDIN) 2 MG tablet Take 1 tablet by mouth 3 times daily (before meals) 5/6/19  Yes Dennise Berry, DO   aspirin 81 MG chewable tablet CHEW ONE TABLET BY MOUTH DAILY 4/3/19  Yes Dennise Gaoa, DO   simvastatin (ZOCOR) 20 MG tablet TAKE ONE TABLET BY MOUTH EVERY NIGHT AT BEDTIME 4/3/19  Yes Dennise Berry, DO   glimepiride (AMARYL) 4 MG tablet Take 4 mg by mouth every morning (before breakfast)   Yes Historical Provider, MD   amLODIPine (NORVASC) 10 MG tablet TAKE ONE TABLET BY MOUTH DAILY 19  Yes Ritchie Bolivar DO   metFORMIN (GLUCOPHAGE-XR) 500 MG extended release tablet TAKE FOUR TABLETS BY MOUTH DAILY WITH BREAKFAST 19  Yes Ritchie Bolivar DO   [facility] LANCETS 85W MISC Test 3 times daily E11.9 19  Yes Ritchie Bolivar DO   Blood Glucose Monitoring Suppl [facility] BLOOD GLUCOSE METER) w/Device KIT Inject 1 Applicatorful into the skin 2 times daily (before meals) 10/5/18  Yes Ritchie Bolivar DO   atenolol (TENORMIN) 50 MG tablet TAKE ONE TABLET BY MOUTH DAILY 10/1/18  Yes Ritchie Bolivar DO   ONE TOUCH ULTRA TEST strip USE TO TEST THREE TIMES A DAY 18  Yes Ritchie Bolivar DO   fexofenadine (ALLEGRA ALLERGY) 180 MG tablet Take 1 tablet by mouth daily Start after completion of Allegra-D 17  Yes Ernestina Hanson MD   Multiple Vitamins-Minerals (THERAPEUTIC MULTIVITAMIN-MINERALS) tablet Take 1 tablet by mouth daily   Yes Historical Provider, MD   fluticasone [facility]) 50 MCG/ACT nasal spray 1 spray by Nasal route daily 17   Ernestina Hanson MD   albuterol sulfate HFA (PROVENTIL HFA) 108 (90 Base) MCG/ACT inhaler Inhale 2 puffs into the lungs every 6 hours as needed for Wheezing 17   Ernestina Hanson MD       Past Medical History:   Diagnosis Date    Asthma exacerbation 2016    Back pain     Diabetes mellitus ([address] 2014    TYPE 2    Epicondylitis, lateral (tennis elbow) 10/27/2014    Fibroid 2011    Hyperlipidemia     Hypertension     Iron deficiency anemia due to chronic blood loss 10/4/2016    Knee pain     Menorrhagia     ZA (obstructive sleep apnea) 3/1/2011    Sleep study done 2011     Vitamin D deficiency 2012       Past Surgical History:   Procedure Laterality Date     SECTION      x3    COLONOSCOPY      TUBAL LIGATION      BTL       Social History Tobacco Use    Smoking status: Never Smoker    Smokeless tobacco: Never Used    Tobacco comment: passive smoke exposure    Substance Use Topics    Alcohol use:  Yes     Alcohol/week: 1.2 oz     Types: 2 Glasses of wine per week     Frequency: 2-4 times a month     Drinks per session: 1 or 2     Binge frequency: Never     Comment: occasionally        Family History   Problem Relation Age of Onset    Diabetes Mother         DM 2    Heart Attack Mother     Heart Disease Mother     High Blood Pressure Mother     Cancer Father         Lung    Stroke Father 48        smoker    No Known Problems Brother     Hypertension Sister     Hypertension Brother     No Known Problems Maternal Aunt     No Known Problems Maternal Uncle     No Known Problems Paternal Aunt     No Known Problems Paternal Uncle     No Known Problems Maternal Grandmother     No Known Problems Maternal Grandfather     No Known Problems Paternal Grandmother     No Known Problems Paternal Grandfather     No Known Problems Other     Rheum Arthritis Neg Hx     Osteoarthritis Neg Hx     Asthma Neg Hx     Breast Cancer Neg Hx     Heart Failure Neg Hx     High Cholesterol Neg Hx     Migraines Neg Hx     Ovarian Cancer Neg Hx     Rashes/Skin Problems Neg Hx     Seizures Neg Hx     Thyroid Disease Neg Hx

## 2019-05-06 NOTE — PATIENT INSTRUCTIONS
Patient Education        Learning About Diabetes Food Guidelines  Your Care Instructions    Meal planning is important to manage diabetes. It helps keep your blood sugar at a target level (which you set with your doctor). You don't have to eat special foods. You can eat what your family eats, including sweets once in a while. But you do have to pay attention to how often you eat and how much you eat of certain foods. You may want to work with a dietitian or a certified diabetes educator (CDE) to help you plan meals and snacks. A dietitian or CDE can also help you lose weight if that is one of your goals. What should you know about eating carbs? Managing the amount of carbohydrate (carbs) you eat is an important part of healthy meals when you have diabetes. Carbohydrate is found in many foods. · Learn which foods have carbs. And learn the amounts of carbs in different foods. ? Bread, cereal, pasta, and rice have about 15 grams of carbs in a serving. A serving is 1 slice of bread (1 ounce), ½ cup of cooked cereal, or 1/3 cup of cooked pasta or rice. ? Fruits have 15 grams of carbs in a serving. A serving is 1 small fresh fruit, such as an apple or orange; ½ of a banana; ½ cup of cooked or canned fruit; ½ cup of fruit juice; 1 cup of melon or raspberries; or 2 tablespoons of dried fruit. ? Milk and no-sugar-added yogurt have 15 grams of carbs in a serving. A serving is 1 cup of milk or 2/3 cup of no-sugar-added yogurt. ? Starchy vegetables have 15 grams of carbs in a serving. A serving is ½ cup of mashed potatoes or sweet potato; 1 cup winter squash; ½ of a small baked potato; ½ cup of cooked beans; or ½ cup cooked corn or green peas. · Learn how much carbs to eat each day and at each meal. A dietitian or CDE can teach you how to keep track of the amount of carbs you eat. This is called carbohydrate counting. · If you are not sure how to count carbohydrate grams, use the Plate Method to plan meals.  It is a good, quick way to make sure that you have a balanced meal. It also helps you spread carbs throughout the day. ? Divide your plate by types of foods. Put non-starchy vegetables on half the plate, meat or other protein food on one-quarter of the plate, and a grain or starchy vegetable in the final quarter of the plate. To this you can add a small piece of fruit and 1 cup of milk or yogurt, depending on how many carbs you are supposed to eat at a meal.  · Try to eat about the same amount of carbs at each meal. Do not \"save up\" your daily allowance of carbs to eat at one meal.  · Proteins have very little or no carbs per serving. Examples of proteins are beef, chicken, turkey, fish, eggs, tofu, cheese, cottage cheese, and peanut butter. A serving size of meat is 3 ounces, which is about the size of a deck of cards. Examples of meat substitute serving sizes (equal to 1 ounce of meat) are 1/4 cup of cottage cheese, 1 egg, 1 tablespoon of peanut butter, and ½ cup of tofu. How can you eat out and still eat healthy? · Learn to estimate the serving sizes of foods that have carbohydrate. If you measure food at home, it will be easier to estimate the amount in a serving of restaurant food. · If the meal you order has too much carbohydrate (such as potatoes, corn, or baked beans), ask to have a low-carbohydrate food instead. Ask for a salad or green vegetables. · If you use insulin, check your blood sugar before and after eating out to help you plan how much to eat in the future. · If you eat more carbohydrate at a meal than you had planned, take a walk or do other exercise. This will help lower your blood sugar. What else should you know? · Limit saturated fat, such as the fat from meat and dairy products. This is a healthy choice because people who have diabetes are at higher risk of heart disease. So choose lean cuts of meat and nonfat or low-fat dairy products.  Use olive or canola oil instead of butter or shortening when cooking. · Don't skip meals. Your blood sugar may drop too low if you skip meals and take insulin or certain medicines for diabetes. · Check with your doctor before you drink alcohol. Alcohol can cause your blood sugar to drop too low. Alcohol can also cause a bad reaction if you take certain diabetes medicines. Follow-up care is a key part of your treatment and safety. Be sure to make and go to all appointments, and call your doctor if you are having problems. It's also a good idea to know your test results and keep a list of the medicines you take. Where can you learn more? Go to [email or web address] and sign in to your Dasher account. Enter Y032 in the Donuts box to learn more about \"Learning About Diabetes Food Guidelines. \"     If you do not have an account, please click on the \"Sign Up Now\" link. Current as of: July 25, 2018  Content Version: 11.9  © 3566-3967 "[employer]. Care instructions adapted under license by [facility] ([facility]). If you have questions about a medical condition or this instruction, always ask your healthcare professional. [place] [address] any warranty or liability for your use of this information.

## 2019-05-31 DIAGNOSIS — I10 ESSENTIAL HYPERTENSION, BENIGN: ICD-10-CM

## 2019-06-03 RX ORDER — ATENOLOL 50 MG/1
TABLET ORAL
Qty: 30 TABLET | Refills: 5 | Status: SHIPPED | OUTPATIENT
Start: 2019-06-03 | End: 2019-11-05

## 2019-06-07 ENCOUNTER — CARE COORDINATION (OUTPATIENT)
Dept: CARE COORDINATION | Age: 54
End: 2019-06-07

## 2019-06-07 NOTE — CARE COORDINATION
Call to patient,ANABEL on VM w [address] name and number    Lab Results   Component Value Date    LABA1C 7.7 05/06/2019     Lab Results   Component Value Date    .3 05/06/2019     Will likely dc from panel if no return call

## 2019-06-10 ENCOUNTER — OFFICE VISIT (OUTPATIENT)
Dept: INTERNAL MEDICINE CLINIC | Age: 54
End: 2019-06-10

## 2019-06-10 ENCOUNTER — CARE COORDINATION (OUTPATIENT)
Dept: CARE COORDINATION | Age: 54
End: 2019-06-10

## 2019-06-10 DIAGNOSIS — R80.9 TYPE 2 DIABETES MELLITUS WITH MICROALBUMINURIA, WITHOUT LONG-TERM CURRENT USE OF INSULIN (HCC): Primary | ICD-10-CM

## 2019-06-10 DIAGNOSIS — E11.29 TYPE 2 DIABETES MELLITUS WITH MICROALBUMINURIA, WITHOUT LONG-TERM CURRENT USE OF INSULIN (HCC): Primary | ICD-10-CM

## 2019-06-10 PROCEDURE — 99999 PR OFFICE/OUTPT VISIT,PROCEDURE ONLY: CPT | Performed by: DIETITIAN, REGISTERED

## 2019-06-10 NOTE — CARE COORDINATION
Call back to patient as no coupons here at office   Call placed to pharm rep and will be dropping off coupons. Patient checking list to see which GLP 1 is formulary.       Plan   scheduled for [facility] [facility] today will bring food logs   will check on formulary medications  Pharm coupon - rep dropping coupons by office

## 2019-06-10 NOTE — PATIENT INSTRUCTIONS
BEHAVIOR GOALS     When to eat: Every five hours while awake, including a healthy snack between meals    What and how much to eat:   1) Plan meals to follow Plate Guide, aiming for three servings of vegetables, fruits, and whole grains daily.   2) Aim for 30 - 45 g carb per meal; 15 - 20 g carb per snack    Physical Activity: Increase frequency of exercise--5 to 6 days per week    Checking Blood Sugar: Include some tests 2 hours after a meal.  Goal: fasting blood sugar less than 110; 2 hours after meal, less than 140     Keeping Records: Keep log book and bring to next appointment

## 2019-06-10 NOTE — CARE COORDINATION
Ambulatory Care Coordination Note  6/10/2019  CM Risk Score: 6  Oscar Mortality Risk Score:      ACC: Sandra Welch RN    Summary Note:   Return call from patient  Reports doing well   BG fluctuates , \" seems to change based on what I eat\"  Discussed meeting stephon VILCHIS at office as she has been keeping food log    Lab Results   Component Value Date    LABA1C 7.7 05/06/2019     Lab Results   Component Value Date    .3 05/06/2019     A1 C improving, was 8.0 in Jan 2019  Unable to afford Victoza   Tried through [facility] but was more expensive  Has [employer], all others medications affordable. Plan  Scheduled w Darien Perry for today  [facility] to check on [employer] coupon for [employer]. Care Coordination Interventions    Program Enrollment:  Complex Care  Referral from Primary Care Provider:  Yes  Suggested Interventions and Community Resources  Diabetes Education:  Completed  Life Skills Coaching:  Completed  Registered Dietician:  Not Started  Zone Management Tools:  Completed         Goals Addressed     None          Prior to Admission medications    Medication Sig Start Date End Date Taking?  Authorizing Provider   atenolol (TENORMIN) 50 MG tablet TAKE ONE TABLET BY MOUTH DAILY 6/3/19   Zion Nazario DO   valsartan-hydrochlorothiazide (DIOVAN-HCT) 320-25 MG per tablet Take 1 tablet by mouth daily    Historical Provider, MD   Liraglutide (VICTOZA) 18 MG/3ML SOPN SC injection Inject 1.2 mg into the skin daily 5/6/19   Zion Nazario DO   repaglinide (PRANDIN) 2 MG tablet Take 1 tablet by mouth 3 times daily (before meals) 5/6/19   Zion Nazario DO   aspirin 81 MG chewable tablet CHEW ONE TABLET BY MOUTH DAILY 4/3/19   Zion Nazario DO   simvastatin (ZOCOR) 20 MG tablet TAKE ONE TABLET BY MOUTH EVERY NIGHT AT BEDTIME 4/3/19   Zion Nazario DO   glimepiride (AMARYL) 4 MG tablet Take 4 mg by mouth every morning (before breakfast)    Historical Provider, MD   amLODIPine (NORVASC) 10 MG

## 2019-06-10 NOTE — PROGRESS NOTES
appointment               Patient Active Problem List   Diagnosis    Hyperlipidemia    Essential hypertension, benign    ZA (obstructive sleep apnea)    Fibroids, intramural    Perimenopause    Vitamin D deficiency    Left ankle pain    Type 2 diabetes mellitus with microalbuminuria (HCC)    Knee pain, chronic    Iron deficiency anemia due to chronic blood loss    Mild intermittent asthma without complication    Colon polyps    Abnormal nuclear cardiac imaging test    Elevated serum protein level       Current Outpatient Medications   Medication Sig Dispense Refill    atenolol (TENORMIN) 50 MG tablet TAKE ONE TABLET BY MOUTH DAILY 30 tablet 5    valsartan-hydrochlorothiazide (DIOVAN-HCT) 320-25 MG per tablet Take 1 tablet by mouth daily      Liraglutide (VICTOZA) 18 MG/3ML SOPN SC injection Inject 1.2 mg into the skin daily 2 pen 3    repaglinide (PRANDIN) 2 MG tablet Take 1 tablet by mouth 3 times daily (before meals) 90 tablet 3    aspirin 81 MG chewable tablet CHEW ONE TABLET BY MOUTH DAILY 30 tablet 2    simvastatin (ZOCOR) 20 MG tablet TAKE ONE TABLET BY MOUTH EVERY NIGHT AT BEDTIME 30 tablet 2    glimepiride (AMARYL) 4 MG tablet Take 4 mg by mouth every morning (before breakfast)      amLODIPine (NORVASC) 10 MG tablet TAKE ONE TABLET BY MOUTH DAILY 30 tablet 4    metFORMIN (GLUCOPHAGE-XR) 500 MG extended release tablet TAKE FOUR TABLETS BY MOUTH DAILY WITH BREAKFAST 120 tablet 3    ONETOUCH DELICA LANCETS 56M MISC Test 3 times daily E11.9 100 each 10    Blood Glucose Monitoring Suppl (ACURA BLOOD GLUCOSE METER) w/Device KIT Inject 1 Applicatorful into the skin 2 times daily (before meals) 1 kit 0    ONE TOUCH ULTRA TEST strip USE TO TEST THREE TIMES A DAY 50 strip 3    fluticasone (FLONASE) 50 MCG/ACT nasal spray 1 spray by Nasal route daily 1 Bottle 5    fexofenadine (ALLEGRA ALLERGY) 180 MG tablet Take 1 tablet by mouth daily Start after completion of Allegra-D 30 tablet 5    from Last 3 Encounters:   05/06/19 51.21 kg/m²   01/07/19 50.08 kg/m²   10/05/18 50.34 kg/m²     Patient's stated goal weight: 150 lb  7% Weight loss goal weight: 260 lb    Physical Activity History:   Physical activity: [employer] (strength training, elliptical)  Frequency of activity: 2 - 3 days/week  Duration of activity: 90 minutes  Obstacles to activity: fatigue    Food and Nutrition History:   Nutrition Awareness/Previous DSMES: none  Beverage consumption: water, regular soda (20 oz bottle occasionally)  Alcohol consumption: 3 glasses wine/week  Frequency of Meals Eaten away from home:frozen meals frequently, carry out frequently  Food Availability Problems: No  Usual Food consumption:   FOOD RECALL    First meal--Usual time: 8:30-9am at work  Today: turkey and bologna sandwich + Grippo chips  Different Day: frosted flakes with whole milk, coffee OR grilled veggies and macaroni/cheese OR egg/cheese/hot met on whole wheat    Snack noon  Tomatoes or jello with fruit    Second meal--Usual time: 2pm  Recent: Atkins beef teriyaki stir-fang with salad/tomatoes  Different Day: pepperoni hot pocket OR     Snack  Pretzel, butterscotch candy    Third meal--Usual time: 8 - 9pm  Recent: bologna sandwich  Different Day: hot mett no bread OR salmon/asparagus/corn on cob x 3    Snack  none     Barriers:   -misinformation about nutrition and Diabetes care       Follow Up Plan: one month    Referring Provider: Riaz Man DO    Time spent with patient: 60 minutes

## 2019-06-12 NOTE — CARE COORDINATION
Call to provider services benefits/ elligibility to determine which GLP 1 injectable is covered.     Number provided for pharm benefits , [employer]     Transferred again to [facility]  Checked coverage for all GLP 1 INjectables  Pt has high deductible plan- $6000  Has met 1276.60 so far    Ozempic- 789.59/mo  Kosta 611.59  Trulicity 844.66  Victoza 941.96    Each would require a PA if has not tried Metformin  Since pt on Metformin, no PA required, however no change in cost.

## 2019-06-13 NOTE — CARE COORDINATION
Call to patient, LM on Vm to please call. Made aware of opportunity to try DPP4 short term perhaps with samples from office.     Will await call back

## 2019-06-13 NOTE — CARE COORDINATION
Advise patient to continue weight reduction, see dietician if she can, the A1c is coming down but it's coming down slowly. She can consider Onglyza/Tradjenta/januvia also, we have some samples, perhaps just short-term.

## 2019-06-17 ENCOUNTER — CARE COORDINATION (OUTPATIENT)
Dept: CARE COORDINATION | Age: 54
End: 2019-06-17

## 2019-06-17 NOTE — PATIENT INSTRUCTIONS
Patient Education        sitagliptin  Pronunciation:  LANDON evy glip tin  Brand:  Januvia  What is the most important information I should know about sitagliptin? Call your doctor if you have symptoms of heart failure --shortness of breath (even while lying down), swelling in your legs or feet, rapid weight gain. Stop taking sitagliptin and call your doctor if you have symptoms of pancreatitis: severe pain in your upper stomach spreading to your back, with or without vomiting. What is sitagliptin? Sitagliptin is an oral diabetes medicine that helps control blood sugar levels. It works by regulating the levels of insulin your body produces after eating. Sitagliptin is used together with diet and exercise to improve blood sugar control in adults with type 2 diabetes mellitus. Sitagliptin is not for treating type 1 diabetes. Sitagliptin may also be used for purposes not listed in this medication guide. What should I discuss with my healthcare provider before taking sitagliptin? You should not use sitagliptin if you are allergic to it, or if you have diabetic ketoacidosis (call your doctor for treatment with insulin). Tell your doctor if you have ever had:  · kidney disease (or if you are on dialysis);  · heart problems;  · pancreatitis;  · high triglycerides (a type of fat in the blood);  · gallstones; or  · alcoholism. Follow your doctor's instructions about using this medicine if you are pregnant. Blood sugar control is very important during pregnancy, and your dose needs may be different during each trimester. Your name may need to be listed on a sitagliptin pregnancy registry when you start using this medicine. It may not be safe to breast-feed a baby while you are using this medicine. Ask your doctor about any risks. Sitagliptin is not approved for use by anyone younger than 25years old. How should I take sitagliptin?   Follow all directions on your prescription label and read all medication guides or instruction sheets. Your doctor may occasionally change your dose. Use the medicine exactly as directed. You may take this medicine with or without food. Follow your doctor's instructions. Your blood sugar will need to be checked often, and you may need other blood tests at your doctor's office. Low blood sugar (hypoglycemia) can happen to everyone who has diabetes. Symptoms include headache, hunger, sweating, irritability, dizziness, nausea, and feeling shaky. To quickly treat low blood sugar, always keep a fast-acting source of sugar with you such as fruit juice, hard candy, crackers, raisins, or non-diet soda. Your doctor can prescribe a glucagon emergency injection kit to use in case you have severe hypoglycemia and cannot eat or drink. Be sure your family and close friends know how to give you this injection in an emergency. Also watch for signs of high blood sugar (hyperglycemia) such as increased thirst or urination, blurred vision, headache, and tiredness. Blood sugar levels can be affected by stress, illness, surgery, exercise, alcohol use, or skipping meals. Ask your doctor before changing your dose or medication schedule. Sitagliptin is only part of a complete treatment program that may also include diet, exercise, weight control, blood sugar testing, and special medical care. Follow your doctor's instructions very closely. Store at room temperature away from moisture, heat, and light. What happens if I miss a dose? Take the medicine as soon as you can, but skip the missed dose if it is almost time for your next dose. Do not take two doses at one time. What happens if I overdose? Seek emergency medical attention or call the Poison Help line at 1-476.711.3522. You may have signs of low blood sugar, such as extreme weakness, blurred vision, sweating, trouble speaking, tremors, stomach pain, confusion, and seizure (convulsions). What should I avoid while taking sitagliptin?   Follow your doctor's instructions about any restrictions on food, beverages, or activity. What are the possible side effects of sitagliptin? Get emergency medical help if you have signs of an allergic reaction (hives, difficult breathing, swelling in your face or throat) or a severe skin reaction (fever, sore throat, burning in your eyes, skin pain, red or purple skin rash that spreads and causes blistering and peeling). Stop taking sitagliptin and call your doctor right away if you have symptoms of pancreatitis: severe pain in your upper stomach spreading to your back, with or without vomiting. Call your doctor at once if you have:  · severe autoimmune reaction --itching, blisters, breakdown of the outer layer of skin;  · severe or ongoing pain in your joints;  · little or no urination; or  · symptoms of heart failure --shortness of breath (even while lying down), swelling in your legs or feet, rapid weight gain. Common side effects may include:  · low blood sugar;  · headache; or  · runny or stuffy nose, sore throat. This is not a complete list of side effects and others may occur. Call your doctor for medical advice about side effects. You may report side effects to FDA at 9-505-FDA-7041. What other drugs will affect sitagliptin? Sitagliptin may not work as well when you use other medicines at the same time. Many other drugs can also affect blood sugar control. This includes prescription and over-the-counter medicines, vitamins, and herbal products. Not all possible interactions are listed here. Tell your doctor about all medicines you start or stop using. Where can I get more information? Your pharmacist can provide more information about sitagliptin. Remember, keep this and all other medicines out of the reach of children, never share your medicines with others, and use this medication only for the indication prescribed.   Every effort has been made to ensure that the information provided by [address] is accurate, up-to-date, and complete, but no guarantee is made to that effect. Drug information contained herein may be time sensitive. [employer] information has been compiled for use by healthcare practitioners and consumers in the [place] and therefore [employer] does not warrant that uses outside of the [place] are appropriate, unless specifically indicated otherwise. "[employer]â„¢s drug information does not endorse drugs, diagnose patients or recommend therapy. [facility] drug information is an informational resource designed to assist licensed healthcare practitioners in caring for their patients and/or to serve consumers viewing this service as a supplement to, and not a substitute for, the expertise, skill, knowledge and judgment of healthcare practitioners. The absence of a warning for a given drug or drug combination in no way should be construed to indicate that the drug or drug combination is safe, effective or appropriate for any given patient. [facility] does not assume any responsibility for any aspect of healthcare administered with the aid of information [employer] provides. The information contained herein is not intended to cover all possible uses, directions, precautions, warnings, drug interactions, allergic reactions, or adverse effects. If you have questions about the drugs you are taking, check with your doctor, nurse or pharmacist.  Copyright 0115-4426 [address]. Version: 9.04. Revision date: 3/7/2018. Care instructions adapted under license by [facility] ([facility]). If you have questions about a medical condition or this instruction, always ask your healthcare professional. [place] [address] any warranty or liability for your use of this information. Patient Education        Noninsulin Medicines for Type 2 Diabetes: Care Instructions  Your Care Instructions    There are different types of noninsulin medicines for diabetes. Each works in a different way.  But they all help you control your blood sugar. Some types help your body make insulin to lower your blood sugar. Others lower how much insulin your body needs. Some can slow how fast your body digests sugars. And some can remove extra glucose through your urine. · Alpha-glucosidase inhibitors. These keep starches from breaking down. This means that they lower the amount of glucose absorbed when you eat. They don't help your body make more insulin. So they will not cause low blood sugar unless you use them with other medicines for diabetes. They include acarbose and miglitol. · DPP-4 inhibitors. These help your body raise the level of insulin after you eat. They also help your body make less of a hormone that raises blood sugar. They include linagliptin, saxagliptin, and sitagliptin. · Incretin hormones (GLP-1 receptor agonists). Your body makes a protein that can raise your insulin level. It also can lower your blood sugar and make you less hungry. You can get shots of hormones that work the same way. They include exenatide and liraglutide. · Meglitinides. These help your body release insulin. They also help slow how your body digests sugars. So they can keep your blood sugar from rising too fast after you eat. They include nateglinide and repaglinide. · Metformin. This lowers how much glucose your liver makes. And it helps you respond better to insulin. It also lowers the amount of stored sugar that your liver releases when you are not eating. · SGLT2 inhibitors. These help to remove extra glucose through your urine. They may also help some people lose weight. They include canagliflozin, dapagliflozin, and empagliflozin. · Sulfonylureas. These help your body release more insulin. Some work for many hours. They can cause low blood sugar if you don't eat as you planned. They include glipizide and glyburide. · Thiazolidinediones. These reduce the amount of blood glucose. They also help you respond better to insulin.  They include pioglitazone and rosiglitazone. You may need to take more than one medicine for diabetes. Two or more medicines may work better to lower your blood sugar level than just one does. Follow-up care is a key part of your treatment and safety. Be sure to make and go to all appointments, and call your doctor if you are having problems. It's also a good idea to know your test results and keep a list of the medicines you take. How can you care for yourself at home? · Eat a healthy diet. Get some exercise each day. This may help you to reduce how much medicine you need. · Do not take other prescription or over-the-counter medicines, vitamins, herbal products, or supplements without talking to your doctor first. Some medicines for type 2 diabetes can cause problems with other medicines or supplements. · Tell your doctor if you plan to get pregnant. Some of these drugs are not safe for pregnant women. · Be safe with medicines. Take your medicines exactly as prescribed. Meglitinides and sulfonylureas can cause your blood sugar to drop very low. Call your doctor if you think you are having a problem with your medicine. · Check your blood sugar often. You can use a glucose monitor. Keeping track can help you know how certain foods, activities, and medicines affect your blood sugar. And it can help you keep your blood sugar from getting so low that it's not safe. When should you call for help? Call 911 anytime you think you may need emergency care. For example, call if:    · You passed out (lost consciousness).     · You are confused or cannot think clearly.     · Your blood sugar is very high or very low.    Watch closely for changes in your health, and be sure to contact your doctor if:    · Your blood sugar stays outside the level your doctor set for you.     · You have any problems. Where can you learn more? Go to https://ximena.[employer]. org and sign in to your [employer] account.  Enter H153 in the Search Health Information box to learn more about \"Noninsulin Medicines for Type 2 Diabetes: Care Instructions. \"     If you do not have an account, please click on the \"Sign Up Now\" link. Current as of: July 25, 2018  Content Version: 12.0  © 7249-4440 [employer]. Care instructions adapted under license by [facility] ([facility]). If you have questions about a medical condition or this instruction, always ask your healthcare professional. [address] any warranty or liability for your use of this information. Patient Education        sitagliptin  Pronunciation:  LANDON ratliff glip tin  Brand:  Januvia  What is the most important information I should know about sitagliptin? Call your doctor if you have symptoms of heart failure --shortness of breath (even while lying down), swelling in your legs or feet, rapid weight gain. Stop taking sitagliptin and call your doctor if you have symptoms of pancreatitis: severe pain in your upper stomach spreading to your back, with or without vomiting. What is sitagliptin? Sitagliptin is an oral diabetes medicine that helps control blood sugar levels. It works by regulating the levels of insulin your body produces after eating. Sitagliptin is used together with diet and exercise to improve blood sugar control in adults with type 2 diabetes mellitus. Sitagliptin is not for treating type 1 diabetes. Sitagliptin may also be used for purposes not listed in this medication guide. What should I discuss with my healthcare provider before taking sitagliptin? You should not use sitagliptin if you are allergic to it, or if you have diabetic ketoacidosis (call your doctor for treatment with insulin). Tell your doctor if you have ever had:  · kidney disease (or if you are on dialysis);  · heart problems;  · pancreatitis;  · high triglycerides (a type of fat in the blood);  · gallstones; or  · alcoholism.   Follow your doctor's instructions about using this medicine if you are pregnant. Blood sugar control is very important during pregnancy, and your dose needs may be different during each trimester. Your name may need to be listed on a sitagliptin pregnancy registry when you start using this medicine. It may not be safe to breast-feed a baby while you are using this medicine. Ask your doctor about any risks. Sitagliptin is not approved for use by anyone younger than 25years old. How should I take sitagliptin? Follow all directions on your prescription label and read all medication guides or instruction sheets. Your doctor may occasionally change your dose. Use the medicine exactly as directed. You may take this medicine with or without food. Follow your doctor's instructions. Your blood sugar will need to be checked often, and you may need other blood tests at your doctor's office. Low blood sugar (hypoglycemia) can happen to everyone who has diabetes. Symptoms include headache, hunger, sweating, irritability, dizziness, nausea, and feeling shaky. To quickly treat low blood sugar, always keep a fast-acting source of sugar with you such as fruit juice, hard candy, crackers, raisins, or non-diet soda. Your doctor can prescribe a glucagon emergency injection kit to use in case you have severe hypoglycemia and cannot eat or drink. Be sure your family and close friends know how to give you this injection in an emergency. Also watch for signs of high blood sugar (hyperglycemia) such as increased thirst or urination, blurred vision, headache, and tiredness. Blood sugar levels can be affected by stress, illness, surgery, exercise, alcohol use, or skipping meals. Ask your doctor before changing your dose or medication schedule. Sitagliptin is only part of a complete treatment program that may also include diet, exercise, weight control, blood sugar testing, and special medical care. Follow your doctor's instructions very closely.   Store at room temperature away from and over-the-counter medicines, vitamins, and herbal products. Not all possible interactions are listed here. Tell your doctor about all medicines you start or stop using. Where can I get more information? Your pharmacist can provide more information about sitagliptin. Remember, keep this and all other medicines out of the reach of children, never share your medicines with others, and use this medication only for the indication prescribed. Every effort has been made to ensure that the information provided by [address] is accurate, up-to-date, and complete, but no guarantee is made to that effect. Drug information contained herein may be time sensitive. [facility] information has been compiled for use by healthcare practitioners and consumers in the [place] and therefore [facility] does not warrant that uses outside of the [place] are appropriate, unless specifically indicated otherwise. [facility]'s drug information does not endorse drugs, diagnose patients or recommend therapy. [facility] drug information is an informational resource designed to assist licensed healthcare practitioners in caring for their patients and/or to serve consumers viewing this service as a supplement to, and not a substitute for, the expertise, skill, knowledge and judgment of healthcare practitioners. The absence of a warning for a given drug or drug combination in no way should be construed to indicate that the drug or drug combination is safe, effective or appropriate for any given patient. [facility] does not assume any responsibility for any aspect of healthcare administered with the aid of information [facility] provides. The information contained herein is not intended to cover all possible uses, directions, precautions, warnings, drug interactions, allergic reactions, or adverse effects.  If you have questions about the drugs you are taking, check with your doctor, nurse or pharmacist.  Copyright 9834-4792 [employer] Version: 9.04. Revision date: 3/7/2018. Care instructions adapted under license by [facility] ([facility]). If you have questions about a medical condition or this instruction, always ask your healthcare professional. [address] any warranty or liability for your use of this information.

## 2019-07-06 RX ORDER — GLIMEPIRIDE 4 MG/1
TABLET ORAL
Qty: 30 TABLET | Refills: 2 | Status: SHIPPED | OUTPATIENT
Start: 2019-07-06 | End: 2019-10-03

## 2019-07-09 ENCOUNTER — OFFICE VISIT (OUTPATIENT)
Dept: INTERNAL MEDICINE CLINIC | Age: 54
End: 2019-07-09

## 2019-07-09 VITALS — BODY MASS INDEX: 50.77 KG/M2 | WEIGHT: 277.6 LBS

## 2019-07-09 DIAGNOSIS — R80.9 TYPE 2 DIABETES MELLITUS WITH MICROALBUMINURIA, WITHOUT LONG-TERM CURRENT USE OF INSULIN (HCC): Primary | ICD-10-CM

## 2019-07-09 DIAGNOSIS — E11.29 TYPE 2 DIABETES MELLITUS WITH MICROALBUMINURIA, WITHOUT LONG-TERM CURRENT USE OF INSULIN (HCC): Primary | ICD-10-CM

## 2019-08-14 ENCOUNTER — CARE COORDINATION (OUTPATIENT)
Dept: CARE COORDINATION | Age: 54
End: 2019-08-14

## 2019-08-30 DIAGNOSIS — I10 ESSENTIAL HYPERTENSION, BENIGN: ICD-10-CM

## 2019-08-30 DIAGNOSIS — E78.5 HYPERLIPIDEMIA: ICD-10-CM

## 2019-08-30 DIAGNOSIS — E11.9 TYPE 2 DIABETES MELLITUS WITHOUT COMPLICATION (HCC): ICD-10-CM

## 2019-08-30 RX ORDER — AMLODIPINE BESYLATE 10 MG/1
TABLET ORAL
Qty: 30 TABLET | Refills: 0 | Status: SHIPPED | OUTPATIENT
Start: 2019-08-30 | End: 2019-09-09 | Stop reason: SDUPTHER

## 2019-08-30 RX ORDER — VALSARTAN AND HYDROCHLOROTHIAZIDE 320; 25 MG/1; MG/1
1 TABLET, FILM COATED ORAL DAILY
Qty: 30 TABLET | Refills: 0 | Status: SHIPPED | OUTPATIENT
Start: 2019-08-30 | End: 2019-09-09 | Stop reason: SDUPTHER

## 2019-08-30 RX ORDER — SIMVASTATIN 20 MG
TABLET ORAL
Qty: 30 TABLET | Refills: 0 | Status: SHIPPED | OUTPATIENT
Start: 2019-08-30 | End: 2019-09-09

## 2019-08-30 RX ORDER — ASPIRIN 81 MG/1
TABLET, CHEWABLE ORAL
Qty: 30 TABLET | Refills: 0 | Status: SHIPPED | OUTPATIENT
Start: 2019-08-30 | End: 2019-09-09 | Stop reason: SDUPTHER

## 2019-08-30 RX ORDER — METFORMIN HYDROCHLORIDE 500 MG/1
TABLET, EXTENDED RELEASE ORAL
Qty: 120 TABLET | Refills: 0 | Status: SHIPPED | OUTPATIENT
Start: 2019-08-30 | End: 2019-09-09 | Stop reason: SDUPTHER

## 2019-09-09 ENCOUNTER — OFFICE VISIT (OUTPATIENT)
Dept: INTERNAL MEDICINE CLINIC | Age: 54
End: 2019-09-09

## 2019-09-09 ENCOUNTER — OFFICE VISIT (OUTPATIENT)
Dept: INTERNAL MEDICINE CLINIC | Age: 54
End: 2019-09-09
Payer: COMMERCIAL

## 2019-09-09 ENCOUNTER — TELEPHONE (OUTPATIENT)
Dept: INTERNAL MEDICINE CLINIC | Age: 54
End: 2019-09-09

## 2019-09-09 VITALS
SYSTOLIC BLOOD PRESSURE: 138 MMHG | HEIGHT: 62 IN | DIASTOLIC BLOOD PRESSURE: 78 MMHG | TEMPERATURE: 97.7 F | BODY MASS INDEX: 52.08 KG/M2 | HEART RATE: 80 BPM | WEIGHT: 283 LBS | OXYGEN SATURATION: 94 % | RESPIRATION RATE: 20 BRPM

## 2019-09-09 DIAGNOSIS — R79.9 ABNORMAL SERUM TOTAL PROTEIN LEVEL: ICD-10-CM

## 2019-09-09 DIAGNOSIS — E78.5 HYPERLIPIDEMIA, UNSPECIFIED HYPERLIPIDEMIA TYPE: ICD-10-CM

## 2019-09-09 DIAGNOSIS — M79.606 PAIN OF LOWER EXTREMITY, UNSPECIFIED LATERALITY: ICD-10-CM

## 2019-09-09 DIAGNOSIS — E11.29 TYPE 2 DIABETES MELLITUS WITH MICROALBUMINURIA, WITHOUT LONG-TERM CURRENT USE OF INSULIN (HCC): Primary | ICD-10-CM

## 2019-09-09 DIAGNOSIS — R80.9 TYPE 2 DIABETES MELLITUS WITH MICROALBUMINURIA, WITHOUT LONG-TERM CURRENT USE OF INSULIN (HCC): Primary | ICD-10-CM

## 2019-09-09 DIAGNOSIS — I10 ESSENTIAL HYPERTENSION, BENIGN: ICD-10-CM

## 2019-09-09 DIAGNOSIS — J30.9 ALLERGIC RHINITIS, UNSPECIFIED SEASONALITY, UNSPECIFIED TRIGGER: ICD-10-CM

## 2019-09-09 DIAGNOSIS — E11.9 TYPE 2 DIABETES MELLITUS WITHOUT COMPLICATION, WITHOUT LONG-TERM CURRENT USE OF INSULIN (HCC): Primary | ICD-10-CM

## 2019-09-09 PROCEDURE — 99214 OFFICE O/P EST MOD 30 MIN: CPT | Performed by: INTERNAL MEDICINE

## 2019-09-09 RX ORDER — AMLODIPINE BESYLATE 10 MG/1
TABLET ORAL
Qty: 30 TABLET | Refills: 5 | Status: SHIPPED | OUTPATIENT
Start: 2019-09-09 | End: 2019-11-05

## 2019-09-09 RX ORDER — MELATONIN
2 DAILY
Status: ON HOLD | COMMUNITY
Start: 2019-09-09 | End: 2020-11-06 | Stop reason: CLARIF

## 2019-09-09 RX ORDER — FLUTICASONE PROPIONATE 50 MCG
1 SPRAY, SUSPENSION (ML) NASAL DAILY
Qty: 1 BOTTLE | Refills: 5 | Status: SHIPPED | OUTPATIENT
Start: 2019-09-09 | End: 2020-06-29 | Stop reason: SDUPTHER

## 2019-09-09 RX ORDER — ASPIRIN 81 MG/1
TABLET, CHEWABLE ORAL
Qty: 30 TABLET | Refills: 5 | Status: SHIPPED | OUTPATIENT
Start: 2019-09-09 | End: 2019-11-05

## 2019-09-09 RX ORDER — VALSARTAN AND HYDROCHLOROTHIAZIDE 320; 25 MG/1; MG/1
1 TABLET, FILM COATED ORAL DAILY
Qty: 30 TABLET | Refills: 5 | Status: SHIPPED | OUTPATIENT
Start: 2019-09-09 | End: 2019-11-05

## 2019-09-09 RX ORDER — FEXOFENADINE HCL 180 MG/1
180 TABLET ORAL DAILY
Qty: 30 TABLET | Refills: 5 | Status: ON HOLD
Start: 2019-09-09 | End: 2020-11-06 | Stop reason: CLARIF

## 2019-09-09 RX ORDER — ATORVASTATIN CALCIUM 20 MG/1
20 TABLET, FILM COATED ORAL DAILY
Qty: 30 TABLET | Refills: 5 | Status: SHIPPED | OUTPATIENT
Start: 2019-09-09 | End: 2020-03-09 | Stop reason: SDUPTHER

## 2019-09-09 RX ORDER — METFORMIN HYDROCHLORIDE 500 MG/1
TABLET, EXTENDED RELEASE ORAL
Qty: 120 TABLET | Refills: 5 | Status: SHIPPED | OUTPATIENT
Start: 2019-09-09 | End: 2019-11-05

## 2019-09-09 ASSESSMENT — ENCOUNTER SYMPTOMS
DIARRHEA: 0
RHINORRHEA: 0
COUGH: 0
TROUBLE SWALLOWING: 0
NAUSEA: 0
SORE THROAT: 0
ABDOMINAL PAIN: 0
SHORTNESS OF BREATH: 0

## 2019-09-09 NOTE — PROGRESS NOTES
Height as of this encounter: 5' 2\" (1.575 m). Weight as of this encounter: 283 lb (128.4 kg). Physical Exam   Constitutional: She is oriented to person, place, and time. She appears well-developed and well-nourished. No distress. HENT:   Head: Normocephalic and atraumatic. Right Ear: External ear normal.   Left Ear: External ear normal.   Nose: Nose normal.   Mouth/Throat: Oropharynx is clear and moist.   Eyes: Pupils are equal, round, and reactive to light. EOM are normal. No scleral icterus. Neck: No thyromegaly present. Cardiovascular: Normal rate and regular rhythm. No murmur heard. Pulmonary/Chest: Breath sounds normal. No respiratory distress. She has no wheezes. She has no rales. Abdominal: Soft. Bowel sounds are normal. She exhibits no distension. There is no tenderness. Musculoskeletal: Normal range of motion. She exhibits no edema. Lymphadenopathy:     She has no cervical adenopathy. Neurological: She is alert and oriented to person, place, and time. No cranial nerve deficit or sensory deficit. Coordination normal.   Skin: Skin is warm and dry. Psychiatric: She has a normal mood and affect. Her behavior is normal.   Nursing note and vitals reviewed. ASSESSMENT/PLAN:  1. Type 2 diabetes mellitus without complication (HCC)  Continue present meds: Glucophage, Prandin, Amaryl, Januvia (samples). Check A1c with addition of Januvia. - Consult to: [facility]) Medication Mgmt (Diabetes - Clinical Pharmacy) - [facility] re: help with getting coverage for GLP1     2. Essential hypertension, benign  Controlled on:  - amLODIPine (NORVASC) 10 MG tablet; TAKE ONE TABLET BY MOUTH DAILY  Dispense: 30 tablet; Refill: 5  - valsartan-hydrochlorothiazide (DIOVAN-HCT) 320-25 MG per tablet; Take 1 tablet by mouth daily  Dispense: 30 tablet; Refill: 5    3. Abnormal serum total protein level    - Electrophoresis Protein, Serum without Reflex to Immunofixation; Future    4.  Pain of lower extremity, unspecified laterality  Suspect AM stiffness likely from arthritis. She will work on stretches and weight loss and call if wants to further pursue.  - CK; Future    5. Allergic rhinitis, unspecified seasonality, unspecified trigger  Fall allergies. - fexofenadine (ALLEGRA ALLERGY) 180 MG tablet; Take 1 tablet by mouth daily Start after completion of Allegra-D  Dispense: 30 tablet; Refill: 5  - fluticasone (FLONASE) 50 MCG/ACT nasal spray; 1 spray by Nasal route daily  Dispense: 1 Bottle; Refill: 5    6. Hyperlipidemia, unspecified hyperlipidemia type  Will change from Zocor to Lipitor due to possible drug interaction with Amlodipine. Last LDL not fully controlled    Return in about 6 months (around 3/9/2020).

## 2019-09-09 NOTE — PROGRESS NOTES
ASSESSMENT    Biochemical Data:    Lab Results   Component Value Date    LABA1C 7.7 05/06/2019     Lab Results   Component Value Date    .3 05/06/2019       Lab Results   Component Value Date    CHOL 144 06/13/2017    CHOL 188 06/01/2017    CHOL 194 02/19/2016     Lab Results   Component Value Date    TRIG 86 06/13/2017    TRIG 87 06/01/2017    TRIG 110 02/19/2016     Lab Results   Component Value Date    HDL 50 05/06/2019    HDL 44 04/05/2018    HDL 41 06/13/2017     Lab Results   Component Value Date    LDLCALC 86 06/13/2017    LDLCALC 126 (H) 06/01/2017    LDLCALC 131 (H) 02/19/2016     Lab Results   Component Value Date    LABVLDL 17 06/13/2017    LABVLDL 17 06/01/2017    LABVLDL 22 02/19/2016     No results found for: CHOLHDLRATIO    Lab Results   Component Value Date    WBC 7.2 11/16/2017    HGB 12.4 11/16/2017    HCT 37.2 11/16/2017    MCV 79.7 (L) 11/16/2017     11/16/2017       Lab Results   Component Value Date    CREATININE 0.8 05/06/2019    BUN 13 05/06/2019     05/06/2019    K 4.0 05/06/2019    CL 99 05/06/2019    CO2 25 05/06/2019       Anthropometric Measurements: Wt Change since last visit:  Yes--up 6 lbs  Wt Readings from Last 3 Encounters:   09/09/19 283 lb (128.4 kg)   07/09/19 277 lb 9.6 oz (125.9 kg)   05/06/19 280 lb (127 kg)       Food and Nutrition Changes:   Beverage consumption:changes: No  Patient reported changes:  No     Physical Activity Changes:   Increased frequency, intensity or length of time? No  Obstacles to activity: daughter's wedding in August    Diabetes Medications:   Recent change in medication type/dosage: No  Comments: Wants to decrease needs for meds; states that as her motivation to change behavior    Monitoring:   Changes to testing regimen?  Yes  Recent results:     Barriers:   Nazario Kuhn off her behavior goals during summer preparing for daughter's wedding        Follow Up Plan: two months    Referring Provider: Paul Ba

## 2019-09-10 ENCOUNTER — TELEPHONE (OUTPATIENT)
Dept: ORTHOPEDIC SURGERY | Age: 54
End: 2019-09-10

## 2019-09-10 NOTE — TELEPHONE ENCOUNTER
PA SUBMITTED VIA CMM FOR Fluticasone Propionate 50MCG/ACT suspension  Key: [id number] - PA Case ID: [id number] - Rx #: [id number]   PENDING

## 2019-09-12 ENCOUNTER — SCHEDULED TELEPHONE ENCOUNTER (OUTPATIENT)
Dept: PHARMACY | Age: 54
End: 2019-09-12

## 2019-09-12 NOTE — TELEPHONE ENCOUNTER
[address] [facility]  Diabetes Service  [phone]    NAME: Sindy Doty  MEDICAL RECORD NUMBER:  [id number] was referred to our Diabetes Center by Dr. Crystal Bennett. Follow up phone call:    I called and left a message for patient to please call back to schedule an appointment in the [facility].       PLAN:   We will try to reach patient again next week    Appropriate staff has been notified with regards to any concerns noted above     [facility]  Diabetes Service  [phone]

## 2019-09-20 ENCOUNTER — SCHEDULED TELEPHONE ENCOUNTER (OUTPATIENT)
Dept: PHARMACY | Age: 54
End: 2019-09-20

## 2019-10-02 DIAGNOSIS — I10 ESSENTIAL HYPERTENSION, BENIGN: ICD-10-CM

## 2019-10-02 DIAGNOSIS — E11.9 TYPE 2 DIABETES MELLITUS WITHOUT COMPLICATION (HCC): ICD-10-CM

## 2019-10-03 ENCOUNTER — OFFICE VISIT (OUTPATIENT)
Dept: PHARMACY | Age: 54
End: 2019-10-03
Payer: COMMERCIAL

## 2019-10-03 ENCOUNTER — TELEPHONE (OUTPATIENT)
Dept: INTERNAL MEDICINE CLINIC | Age: 54
End: 2019-10-03

## 2019-10-03 VITALS — WEIGHT: 285.9 LBS | BODY MASS INDEX: 52.29 KG/M2 | SYSTOLIC BLOOD PRESSURE: 151 MMHG | DIASTOLIC BLOOD PRESSURE: 95 MMHG

## 2019-10-03 PROCEDURE — 99214 OFFICE O/P EST MOD 30 MIN: CPT

## 2019-10-03 RX ORDER — AMLODIPINE BESYLATE 10 MG/1
TABLET ORAL
Qty: 30 TABLET | Refills: 0 | OUTPATIENT
Start: 2019-10-03

## 2019-10-03 RX ORDER — VALSARTAN AND HYDROCHLOROTHIAZIDE 320; 25 MG/1; MG/1
TABLET, FILM COATED ORAL
Qty: 30 TABLET | Refills: 0 | OUTPATIENT
Start: 2019-10-03

## 2019-10-03 RX ORDER — GLIMEPIRIDE 4 MG/1
6 TABLET ORAL
Qty: 45 TABLET | Refills: 2 | Status: SHIPPED | OUTPATIENT
Start: 2019-10-03 | End: 2020-02-06

## 2019-10-03 RX ORDER — METFORMIN HYDROCHLORIDE 500 MG/1
TABLET, EXTENDED RELEASE ORAL
Qty: 120 TABLET | Refills: 0 | OUTPATIENT
Start: 2019-10-03

## 2019-10-03 RX ORDER — ASPIRIN 81 MG/1
TABLET, CHEWABLE ORAL
Qty: 30 TABLET | Refills: 0 | OUTPATIENT
Start: 2019-10-03

## 2019-10-09 ENCOUNTER — CARE COORDINATION (OUTPATIENT)
Dept: CARE COORDINATION | Age: 54
End: 2019-10-09

## 2019-10-18 ENCOUNTER — OFFICE VISIT (OUTPATIENT)
Dept: PHARMACY | Age: 54
End: 2019-10-18
Payer: COMMERCIAL

## 2019-10-18 VITALS — BODY MASS INDEX: 52.09 KG/M2 | WEIGHT: 284.8 LBS

## 2019-10-18 DIAGNOSIS — E11.29 TYPE 2 DIABETES MELLITUS WITH MICROALBUMINURIA, WITHOUT LONG-TERM CURRENT USE OF INSULIN (HCC): Primary | ICD-10-CM

## 2019-10-18 DIAGNOSIS — R80.9 TYPE 2 DIABETES MELLITUS WITH MICROALBUMINURIA, WITHOUT LONG-TERM CURRENT USE OF INSULIN (HCC): Primary | ICD-10-CM

## 2019-10-18 PROCEDURE — 99213 OFFICE O/P EST LOW 20 MIN: CPT

## 2019-11-04 DIAGNOSIS — I10 ESSENTIAL HYPERTENSION, BENIGN: ICD-10-CM

## 2019-11-04 DIAGNOSIS — E11.9 TYPE 2 DIABETES MELLITUS WITHOUT COMPLICATION (HCC): ICD-10-CM

## 2019-11-05 RX ORDER — AMLODIPINE BESYLATE 10 MG/1
TABLET ORAL
Qty: 30 TABLET | Refills: 0 | Status: SHIPPED | OUTPATIENT
Start: 2019-11-05 | End: 2019-11-08 | Stop reason: SDUPTHER

## 2019-11-05 RX ORDER — VALSARTAN AND HYDROCHLOROTHIAZIDE 320; 25 MG/1; MG/1
TABLET, FILM COATED ORAL
Qty: 30 TABLET | Refills: 0 | Status: SHIPPED | OUTPATIENT
Start: 2019-11-05 | End: 2019-11-08 | Stop reason: SDUPTHER

## 2019-11-05 RX ORDER — ASPIRIN 81 MG/1
TABLET, CHEWABLE ORAL
Qty: 30 TABLET | Refills: 0 | Status: SHIPPED | OUTPATIENT
Start: 2019-11-05 | End: 2020-03-09

## 2019-11-05 RX ORDER — METFORMIN HYDROCHLORIDE 500 MG/1
TABLET, EXTENDED RELEASE ORAL
Qty: 120 TABLET | Refills: 0 | Status: SHIPPED | OUTPATIENT
Start: 2019-11-05 | End: 2019-11-08 | Stop reason: SDUPTHER

## 2019-11-07 ASSESSMENT — ENCOUNTER SYMPTOMS
SHORTNESS OF BREATH: 0
SORE THROAT: 0
DIARRHEA: 0
NAUSEA: 0
RHINORRHEA: 0
COUGH: 0
TROUBLE SWALLOWING: 0
ABDOMINAL PAIN: 0

## 2019-11-08 ENCOUNTER — OFFICE VISIT (OUTPATIENT)
Dept: INTERNAL MEDICINE CLINIC | Age: 54
End: 2019-11-08

## 2019-11-08 ENCOUNTER — OFFICE VISIT (OUTPATIENT)
Dept: INTERNAL MEDICINE CLINIC | Age: 54
End: 2019-11-08
Payer: COMMERCIAL

## 2019-11-08 VITALS
TEMPERATURE: 97.8 F | SYSTOLIC BLOOD PRESSURE: 146 MMHG | WEIGHT: 284 LBS | BODY MASS INDEX: 51.94 KG/M2 | DIASTOLIC BLOOD PRESSURE: 90 MMHG | HEART RATE: 60 BPM | OXYGEN SATURATION: 95 % | RESPIRATION RATE: 16 BRPM

## 2019-11-08 DIAGNOSIS — E11.9 TYPE 2 DIABETES MELLITUS WITHOUT COMPLICATION, WITHOUT LONG-TERM CURRENT USE OF INSULIN (HCC): ICD-10-CM

## 2019-11-08 DIAGNOSIS — G47.30 SLEEP APNEA, UNSPECIFIED TYPE: ICD-10-CM

## 2019-11-08 DIAGNOSIS — I10 ESSENTIAL HYPERTENSION, BENIGN: Primary | ICD-10-CM

## 2019-11-08 PROCEDURE — 99214 OFFICE O/P EST MOD 30 MIN: CPT | Performed by: INTERNAL MEDICINE

## 2019-11-08 RX ORDER — CLONIDINE HYDROCHLORIDE 0.1 MG/1
0.05 TABLET ORAL 2 TIMES DAILY
Qty: 30 TABLET | Refills: 5 | Status: SHIPPED | OUTPATIENT
Start: 2019-11-08 | End: 2020-03-24 | Stop reason: ALTCHOICE

## 2019-11-08 RX ORDER — VALSARTAN AND HYDROCHLOROTHIAZIDE 320; 25 MG/1; MG/1
TABLET, FILM COATED ORAL
Qty: 30 TABLET | Refills: 5 | Status: SHIPPED | OUTPATIENT
Start: 2019-11-08 | End: 2020-07-08

## 2019-11-08 RX ORDER — AMLODIPINE BESYLATE 10 MG/1
TABLET ORAL
Qty: 30 TABLET | Refills: 5 | Status: SHIPPED | OUTPATIENT
Start: 2019-11-08 | End: 2020-07-08

## 2019-11-08 RX ORDER — METFORMIN HYDROCHLORIDE 500 MG/1
TABLET, EXTENDED RELEASE ORAL
Qty: 120 TABLET | Refills: 5 | Status: SHIPPED | OUTPATIENT
Start: 2019-11-08 | End: 2020-07-08

## 2020-01-10 ENCOUNTER — OFFICE VISIT (OUTPATIENT)
Dept: INTERNAL MEDICINE CLINIC | Age: 55
End: 2020-01-10

## 2020-01-10 NOTE — PATIENT INSTRUCTIONS
BEHAVIOR GOALS     Physical Activity: Pack your bag and go to gym from work. Remind yourself of the benefits of exercise. Plan meals ahead.

## 2020-01-10 NOTE — PROGRESS NOTES
intermittent asthma without complication    Colon polyps    Abnormal nuclear cardiac imaging test    Elevated serum protein level    Allergic rhinitis    Diabetes mellitus type 2, uncontrolled, with complications (HCC)    Essential hypertension       Current Outpatient Medications   Medication Sig Dispense Refill    cloNIDine (CATAPRES) 0.1 MG tablet Take 0.5 tablets by mouth 2 times daily 30 tablet 5    amLODIPine (NORVASC) 10 MG tablet TAKE ONE TABLET BY MOUTH DAILY 30 tablet 5    metFORMIN (GLUCOPHAGE-XR) 500 MG extended release tablet TAKE FOUR TABLETS BY MOUTH DAILY WITH BREAKFAST 120 tablet 5    valsartan-hydrochlorothiazide (DIOVAN-HCT) 320-25 MG per tablet TAKE ONE TABLET BY MOUTH DAILY 30 tablet 5    aspirin 81 MG chewable tablet CHEW 1 TABLET BY MOUTH ONCE DAILY 30 tablet 0    glimepiride (AMARYL) 4 MG tablet Take 1.5 tablets by mouth every morning (before breakfast) 45 tablet 2    atorvastatin (LIPITOR) 20 MG tablet Take 1 tablet by mouth daily Replaces Simvastatin (due to drug interaction with Amlodipine) 30 tablet 5    Cholecalciferol (VITAMIN D3) 1000 units TABS Take 2 tablets by mouth daily      fexofenadine (ALLEGRA ALLERGY) 180 MG tablet Take 1 tablet by mouth daily Start after completion of Allegra-D 30 tablet 5    fluticasone (FLONASE) 50 MCG/ACT nasal spray 1 spray by Nasal route daily 1 Bottle 5    ONE TOUCH ULTRA TEST strip USE TO TEST THREE TIMES A DAY 50 strip 2    ONETOUCH DELICA LANCETS 71B MISC Test 3 times daily E11.9 100 each 10    Blood Glucose Monitoring Suppl (ACURA BLOOD GLUCOSE METER) w/Device KIT Inject 1 Applicatorful into the skin 2 times daily (before meals) 1 kit 0    Multiple Vitamins-Minerals (THERAPEUTIC MULTIVITAMIN-MINERALS) tablet Take 1 tablet by mouth daily       No current facility-administered medications for this visit.           NUTRITION ASSESSMENT    Biochemical Data:    Lab Results   Component Value Date    LABA1C 7.1 09/09/2019     Lab Results

## 2020-02-06 RX ORDER — GLIMEPIRIDE 4 MG/1
TABLET ORAL
Qty: 45 TABLET | Refills: 5 | Status: SHIPPED | OUTPATIENT
Start: 2020-02-06 | End: 2020-06-04

## 2020-02-17 ENCOUNTER — OFFICE VISIT (OUTPATIENT)
Dept: SLEEP MEDICINE | Age: 55
End: 2020-02-17
Payer: COMMERCIAL

## 2020-02-17 VITALS
HEART RATE: 68 BPM | DIASTOLIC BLOOD PRESSURE: 88 MMHG | OXYGEN SATURATION: 94 % | WEIGHT: 279 LBS | HEIGHT: 62 IN | BODY MASS INDEX: 51.34 KG/M2 | SYSTOLIC BLOOD PRESSURE: 120 MMHG | RESPIRATION RATE: 16 BRPM

## 2020-02-17 PROCEDURE — 99203 OFFICE O/P NEW LOW 30 MIN: CPT | Performed by: PSYCHIATRY & NEUROLOGY

## 2020-02-17 ASSESSMENT — SLEEP AND FATIGUE QUESTIONNAIRES
HOW LIKELY ARE YOU TO NOD OFF OR FALL ASLEEP WHILE LYING DOWN TO REST IN THE AFTERNOON WHEN CIRCUMSTANCES PERMIT: 3
HOW LIKELY ARE YOU TO NOD OFF OR FALL ASLEEP WHILE WATCHING TV: 2
ESS TOTAL SCORE: 16
HOW LIKELY ARE YOU TO NOD OFF OR FALL ASLEEP WHILE SITTING AND TALKING TO SOMEONE: 1
HOW LIKELY ARE YOU TO NOD OFF OR FALL ASLEEP WHEN YOU ARE A PASSENGER IN A CAR FOR AN HOUR WITHOUT A BREAK: 3
HOW LIKELY ARE YOU TO NOD OFF OR FALL ASLEEP IN A CAR, WHILE STOPPED FOR A FEW MINUTES IN TRAFFIC: 1
HOW LIKELY ARE YOU TO NOD OFF OR FALL ASLEEP WHILE SITTING QUIETLY AFTER LUNCH WITHOUT ALCOHOL: 2
HOW LIKELY ARE YOU TO NOD OFF OR FALL ASLEEP WHILE SITTING INACTIVE IN A PUBLIC PLACE: 2
HOW LIKELY ARE YOU TO NOD OFF OR FALL ASLEEP WHILE SITTING AND READING: 2

## 2020-02-17 ASSESSMENT — ENCOUNTER SYMPTOMS
ALLERGIC/IMMUNOLOGIC NEGATIVE: 1
CHOKING: 1
APNEA: 1
SHORTNESS OF BREATH: 1
EYES NEGATIVE: 1
COUGH: 1
GASTROINTESTINAL NEGATIVE: 1

## 2020-02-17 NOTE — PATIENT INSTRUCTIONS
Some machines have air pressure that adjusts on its own. Others have air pressures that are different when you breathe in than when you breathe out. This may reduce discomfort caused by too much pressure in your nose. Where can you learn more? Go to [email or web address] and sign in to your [employer]To[employer] account. Enter G273 in the [employer] box to learn more about \"Learning About CPAP for Sleep Apnea. \"     If you do not have an account, please click on the \"Sign Up Now\" link. Current as of: June 9, 2019  Content Version: 12.3  © 4814-0311 Healthwise, Incorporated. Care instructions adapted under license by [facility] ([facility]). If you have questions about a medical condition or this instruction, always ask your healthcare professional. [address] any warranty or liability for your use of this information.

## 2020-02-28 RX ORDER — ATENOLOL 50 MG/1
50 TABLET ORAL DAILY
Qty: 30 TABLET | Refills: 5 | Status: ON HOLD | OUTPATIENT
Start: 2020-02-28 | End: 2020-11-11 | Stop reason: SDUPTHER

## 2020-02-28 RX ORDER — ATENOLOL 50 MG/1
50 TABLET ORAL DAILY
COMMUNITY
End: 2020-02-28 | Stop reason: SDUPTHER

## 2020-02-28 NOTE — TELEPHONE ENCOUNTER
Last appointment: 11/8/2019  Next appointment: 3/9/2020  Last refill: 06/03/2019 # 30 with 5 refills

## 2020-03-09 ENCOUNTER — OFFICE VISIT (OUTPATIENT)
Dept: INTERNAL MEDICINE CLINIC | Age: 55
End: 2020-03-09

## 2020-03-09 ENCOUNTER — OFFICE VISIT (OUTPATIENT)
Dept: INTERNAL MEDICINE CLINIC | Age: 55
End: 2020-03-09
Payer: COMMERCIAL

## 2020-03-09 VITALS
OXYGEN SATURATION: 94 % | WEIGHT: 272 LBS | TEMPERATURE: 97.7 F | HEART RATE: 68 BPM | SYSTOLIC BLOOD PRESSURE: 128 MMHG | RESPIRATION RATE: 16 BRPM | DIASTOLIC BLOOD PRESSURE: 76 MMHG | BODY MASS INDEX: 49.75 KG/M2

## 2020-03-09 PROCEDURE — 99214 OFFICE O/P EST MOD 30 MIN: CPT | Performed by: INTERNAL MEDICINE

## 2020-03-09 RX ORDER — ATORVASTATIN CALCIUM 40 MG/1
40 TABLET, FILM COATED ORAL DAILY
Qty: 30 TABLET | Refills: 5 | Status: SHIPPED | OUTPATIENT
Start: 2020-03-09 | End: 2020-07-20 | Stop reason: SDUPTHER

## 2020-03-09 ASSESSMENT — ENCOUNTER SYMPTOMS
NAUSEA: 0
SORE THROAT: 0
TROUBLE SWALLOWING: 0
RHINORRHEA: 0
SHORTNESS OF BREATH: 0
ABDOMINAL PAIN: 0
DIARRHEA: 0
COUGH: 0

## 2020-03-09 ASSESSMENT — PATIENT HEALTH QUESTIONNAIRE - PHQ9
1. LITTLE INTEREST OR PLEASURE IN DOING THINGS: 0
SUM OF ALL RESPONSES TO PHQ QUESTIONS 1-9: 0
2. FEELING DOWN, DEPRESSED OR HOPELESS: 0
SUM OF ALL RESPONSES TO PHQ9 QUESTIONS 1 & 2: 0
SUM OF ALL RESPONSES TO PHQ QUESTIONS 1-9: 0

## 2020-03-09 NOTE — PROGRESS NOTES
[address] [facility] PRIMARY CARE  [address] [place] [id number]  Dept: [phone]  Dept Fax: [phone]  Loc: [phone]     3/9/2020    Toby Nuñez (:  1965) is a 49 y.o. female, here for evaluation of the following medical concerns:    Chief Complaint   Patient presents with    3 Month Follow-Up     Elevated blood sugars for two weeks. Out of Januvia for one month. HPI   Patient is here for routine visit. She states her sugars have been running higher because she ran out of her Januvia. Overall she feels well. Review of Systems   HENT: Negative for rhinorrhea, sore throat and trouble swallowing. Eyes: Negative for visual disturbance. Respiratory: Negative for cough and shortness of breath. Cardiovascular: Negative for chest pain and palpitations. Gastrointestinal: Negative for abdominal pain, diarrhea and nausea. Genitourinary: Negative for decreased urine volume, dysuria and frequency. Musculoskeletal: Positive for arthralgias (right knee). Negative for myalgias. Back and leg stiffness in AM, restless leg symptoms   Skin: Negative for rash. Allergic/Immunologic: Negative for immunocompromised state. Neurological: Negative for dizziness, numbness and headaches. Hematological: Does not bruise/bleed easily. Psychiatric/Behavioral: Negative for dysphoric mood and sleep disturbance. The patient is not nervous/anxious.       Current Outpatient Medications   Medication Sig Dispense Refill    SITagliptin (JANUVIA) 100 MG tablet Take 1 tablet by mouth daily 30 tablet 3    atorvastatin (LIPITOR) 40 MG tablet Take 1 tablet by mouth daily Replaces Simvastatin (due to drug interaction with Amlodipine) 30 tablet 5    atenolol (TENORMIN) 50 MG tablet Take 1 tablet by mouth daily 30 tablet 5    glimepiride (AMARYL) 4 MG tablet TAKE ONE AND ONE-HALF TABLET BY MOUTH EVERY MORNING BEFORE BREAKFAST 45 tablet 5    cloNIDine (CATAPRES) 0.1 MG tablet Take 0.5 tablets by mouth 2 times daily 30 tablet 5    amLODIPine (NORVASC) 10 MG tablet TAKE ONE TABLET BY MOUTH DAILY 30 tablet 5    metFORMIN (GLUCOPHAGE-XR) 500 MG extended release tablet TAKE FOUR TABLETS BY MOUTH DAILY WITH BREAKFAST 120 tablet 5    valsartan-hydrochlorothiazide (DIOVAN-HCT) 320-25 MG per tablet TAKE ONE TABLET BY MOUTH DAILY 30 tablet 5    Cholecalciferol (VITAMIN D3) 1000 units TABS Take 2 tablets by mouth daily      fexofenadine (ALLEGRA ALLERGY) 180 MG tablet Take 1 tablet by mouth daily Start after completion of Allegra-D 30 tablet 5    fluticasone (FLONASE) 50 MCG/ACT nasal spray 1 spray by Nasal route daily 1 Bottle 5    ONE TOUCH ULTRA TEST strip USE TO TEST THREE TIMES A DAY 50 strip 2    ONETOUCH DELICA LANCETS 20I MISC Test 3 times daily E11.9 100 each 10    Blood Glucose Monitoring Suppl (ACURA BLOOD GLUCOSE METER) w/Device KIT Inject 1 Applicatorful into the skin 2 times daily (before meals) 1 kit 0    Multiple Vitamins-Minerals (THERAPEUTIC MULTIVITAMIN-MINERALS) tablet Take 1 tablet by mouth daily       No current facility-administered medications for this visit. Allergies   Allergen Reactions    Prednisone Other (See Comments)     Made her feel loopy       Past Medical History:   Diagnosis Date    Allergic rhinitis     Alopecia     [facility] mellitus type [facility], uncontrolled, with complications [facility]     TYPE 2    Essential hypertension     Hyperlipidemia     ZA (obstructive sleep apnea) 2011    [facility]     Vitamin D deficiency 2012       Past Surgical History:   Procedure Laterality Date     SECTION      x3    COLONOSCOPY  2017    fu 5 years    TUBAL LIGATION      BTL       Social History     Tobacco Use    Smoking status: Never Smoker    Smokeless tobacco: Never Used    Tobacco comment: passive smoke exposure    Substance Use Topics    Alcohol use:  Yes Alcohol/week: 2.0 standard drinks     Types: 2 Glasses of wine per week     Frequency: 2-4 times a month     Drinks per session: 1 or 2     Binge frequency: Never     Comment: occasionally     Drug use: No        Family History   Problem Relation Age of Onset    Diabetes Mother         DM 2    Heart Attack Mother     Heart Disease Mother     High Blood Pressure Mother     Cancer Father         Lung    Stroke Father 48        smoker    No Known Problems Brother     Hypertension Sister     Hypertension Brother     No Known Problems Maternal Aunt     No Known Problems Maternal Uncle     No Known Problems Paternal Aunt     No Known Problems Paternal Uncle     No Known Problems Maternal Grandmother     No Known Problems Maternal Grandfather     No Known Problems Paternal Grandmother     No Known Problems Paternal Grandfather     No Known Problems Other     Breast Cancer Maternal Cousin     Rheum Arthritis Neg Hx     Osteoarthritis Neg Hx     Asthma Neg Hx     Heart Failure Neg Hx     High Cholesterol Neg Hx     Migraines Neg Hx     Ovarian Cancer Neg Hx     Rashes/Skin Problems Neg Hx     Seizures Neg Hx     Thyroid Disease Neg Hx        Vitals:    03/09/20 1311   BP: 128/76   Site: Right Upper Arm   Position: Sitting   Cuff Size: Large Adult   Pulse: 68  Comment: Regular   Resp: 16   Temp: 97.7 °F (36.5 °C)   TempSrc: Oral   SpO2: 94%   Weight: 272 lb (123.4 kg)     Estimated body mass index is 49.75 kg/m² as calculated from the following:    Height as of 2/17/20: 5' 2\" (1.575 m). Weight as of this encounter: 272 lb (123.4 kg). Physical Exam  Vitals signs and nursing note reviewed. Constitutional:       General: She is not in acute distress. Appearance: She is well-developed. HENT:      Head: Normocephalic and atraumatic. Right Ear: External ear normal.      Left Ear: External ear normal.      Nose: Nose normal.   Eyes:      General: No scleral icterus.      Pupils: Pupils are equal, round, and reactive to light. Neck:      Thyroid: No thyromegaly. Cardiovascular:      Rate and Rhythm: Normal rate and regular rhythm. Heart sounds: No murmur. Pulmonary:      Effort: Pulmonary effort is normal. No respiratory distress. Breath sounds: No wheezing or rales. Abdominal:      General: Bowel sounds are normal. There is no distension. Palpations: Abdomen is soft. Tenderness: There is no abdominal tenderness. Musculoskeletal: Normal range of motion. Lymphadenopathy:      Cervical: No cervical adenopathy. Skin:     General: Skin is warm and dry. Neurological:      Mental Status: She is alert and oriented to person, place, and time. Cranial Nerves: No cranial nerve deficit. Sensory: No sensory deficit. Coordination: Coordination normal.   Psychiatric:         Behavior: Behavior normal.         ASSESSMENT/PLAN:  Max Ellis was seen today for 3 month follow-up. Diagnoses and all orders for this visit:    Type 2 diabetes mellitus with microalbuminuria, without long-term current use of insulin (HCC)  Continue present meds: Glucophage, Amaryl, samples of Januvia   If she is not making improvements then we will likely discuss SGLT 2 medications to help with weight loss but price concerns for patient    -     Hemoglobin A1C; Future  -     Comprehensive Metabolic Panel; Future    Secondary hypertension  Patient is on amlodipine, clonidine, and Diovan HCT. Due to being on high dose of 4 medications will refer to nephrology to look for secondary causes.   -     [facility] ([facility])- Tha Mchugh MD, Nephrology, [facility]  -     US RETROPERITONEAL COMPLETE; Future    Hyperlipidemia, unspecified hyperlipidemia type  -    Increase to atorvastatin 40 mg.  Okay to stop aspirin under new guidelines    Arthritis of right knee  -     OS[facility] PT - [facility]    Papule of skin (warty growth occiput)  -     Eve Esteves MD, Dermatology, [place]    Restless leg  -     Iron and TIBC; Future  -     Ferritin; Future    Hepatitis B immunity?  -     Hepatitis B Surface Antibody; Future      Return in about 3 months (around 6/9/2020).

## 2020-03-09 NOTE — PATIENT INSTRUCTIONS
BEHAVIOR GOALS       What to eat:   1) Instead of 2 packets oatmeal with raisins, try 1 packet with raisins and nuts.   2) Pay attention to your body, eating only till satisfied    Physical Activity: All or most days of the week    Blood sugar testing: bring log to next appointment    Other: Schedule follow up appointment for Olga

## 2020-03-09 NOTE — PATIENT INSTRUCTIONS
januvia samples    Stop aspirin    INCREASE ATORVASTATIN TO 40    Renal ultrasound    Nephrology to discuss why on so many blood pressure meds    DERMATOLOGY    LABS TODAY

## 2020-03-09 NOTE — PROGRESS NOTES
protein level    Allergic rhinitis    Diabetes mellitus type 2, uncontrolled, with complications (HCC)    Essential hypertension       Current Outpatient Medications   Medication Sig Dispense Refill    atenolol (TENORMIN) 50 MG tablet Take 1 tablet by mouth daily 30 tablet 5    glimepiride (AMARYL) 4 MG tablet TAKE ONE AND ONE-HALF TABLET BY MOUTH EVERY MORNING BEFORE BREAKFAST 45 tablet 5    cloNIDine (CATAPRES) 0.1 MG tablet Take 0.5 tablets by mouth 2 times daily 30 tablet 5    amLODIPine (NORVASC) 10 MG tablet TAKE ONE TABLET BY MOUTH DAILY 30 tablet 5    metFORMIN (GLUCOPHAGE-XR) 500 MG extended release tablet TAKE FOUR TABLETS BY MOUTH DAILY WITH BREAKFAST 120 tablet 5    valsartan-hydrochlorothiazide (DIOVAN-HCT) 320-25 MG per tablet TAKE ONE TABLET BY MOUTH DAILY 30 tablet 5    aspirin 81 MG chewable tablet CHEW 1 TABLET BY MOUTH ONCE DAILY 30 tablet 0    atorvastatin (LIPITOR) 20 MG tablet Take 1 tablet by mouth daily Replaces Simvastatin (due to drug interaction with Amlodipine) 30 tablet 5    Cholecalciferol (VITAMIN D3) 1000 units TABS Take 2 tablets by mouth daily      fexofenadine (ALLEGRA ALLERGY) 180 MG tablet Take 1 tablet by mouth daily Start after completion of Allegra-D 30 tablet 5    fluticasone (FLONASE) 50 MCG/ACT nasal spray 1 spray by Nasal route daily 1 Bottle 5    ONE TOUCH ULTRA TEST strip USE TO TEST THREE TIMES A DAY 50 strip 2    ONETOUCH DELICA LANCETS 99F MISC Test 3 times daily E11.9 100 each 10    Blood Glucose Monitoring Suppl (ACURA BLOOD GLUCOSE METER) w/Device KIT Inject 1 Applicatorful into the skin 2 times daily (before meals) 1 kit 0    Multiple Vitamins-Minerals (THERAPEUTIC MULTIVITAMIN-MINERALS) tablet Take 1 tablet by mouth daily       No current facility-administered medications for this visit.           NUTRITION ASSESSMENT    Biochemical Data:    Lab Results   Component Value Date    LABA1C 7.1 09/09/2019     Lab Results   Component Value Date    EAG 157.1 2019       Lab Results   Component Value Date    CHOL 144 2017    CHOL 188 2017    CHOL 194 2016     Lab Results   Component Value Date    TRIG 86 2017    TRIG 87 2017    TRIG 110 2016     Lab Results   Component Value Date    HDL 50 2019    HDL 44 2018    HDL 41 2017     Lab Results   Component Value Date    LDLCALC 86 2017    LDLCALC 126 (H) 2017    LDLCALC 131 (H) 2016     Lab Results   Component Value Date    LABVLDL 17 2017    LABVLDL 17 2017    LABVLDL 22 2016     No results found for: CHOLHDLRATIO    Lab Results   Component Value Date    WBC 7.2 2017    HGB 12.4 2017    HCT 37.2 2017    MCV 79.7 (L) 2017     2017       Lab Results   Component Value Date    CREATININE 0.8 2019    BUN 13 2019     2019    K 4.0 2019    CL 99 2019    CO2 25 2019       Anthropometric Measurements: Wt Change since last visit:  Yes--down 5 lb  Comments: Wt Readings from Last 3 Encounters:   20 279 lb (126.6 kg)   19 284 lb (128.8 kg)   10/18/19 284 lb 12.8 oz (129.2 kg)         Food and Nutrition Changes:   Beverage consumption:changes: Yes--stopped juice, using water instead   B: 2 packets Instant Steel Cut Oats (apple cinnamon) with added raisins (~66 g carb)  L/D : chicken (dark meat) with eric slaw OR salmon with pasta and broccoli  S: cutie orange, apple, grapes    Physical Activity Changes:   Increased frequency, intensity or length of time? Yes  Obstacles to activity: treadmill at home not usable due to construction project     Diabetes Medications:   Recent change in medication type/dosage: Yes--increased glimeperide to 2 daily  Comments: Reviewed hypoglycemia prevention/treatment  Monitoring:   Changes to testing regimen?  No  Recent results: FB    Barriers:   -none        Follow Up Plan: 3 months    Referring Provider: Joon Downing Abram Reynaga MD    Time spent with patient: 30 minutes

## 2020-03-10 ENCOUNTER — HOSPITAL ENCOUNTER (OUTPATIENT)
Dept: ULTRASOUND IMAGING | Age: 55
Discharge: HOME | End: 2020-03-10
Payer: COMMERCIAL

## 2020-03-10 PROCEDURE — 76770 US EXAM ABDO BACK WALL COMP: CPT

## 2020-03-11 ENCOUNTER — HOSPITAL ENCOUNTER (OUTPATIENT)
Dept: PHYSICAL THERAPY | Age: 55
Setting detail: THERAPIES SERIES
Discharge: HOME | End: 2020-03-11
Payer: COMMERCIAL

## 2020-03-11 PROCEDURE — 97112 NEUROMUSCULAR REEDUCATION: CPT

## 2020-03-11 PROCEDURE — 97110 THERAPEUTIC EXERCISES: CPT

## 2020-03-11 PROCEDURE — 97162 PT EVAL MOD COMPLEX 30 MIN: CPT

## 2020-03-11 NOTE — FLOWSHEET NOTE
[facility] [address] [place] [address],[address], [facility] 130 [address] [facility], [address] [place] [address]  Phone: [phone]   Fax:     [phone]    Physical Therapy Daily Treatment Note  Date:  3/11/2020    Patient Name:  Edvin Bergman    :  1965  MRN: [id number]  Restrictions/Precautions:    Medical/Treatment Diagnosis Information:  Diagnosis: M17.11 (ICD-10-CM) - Arthritis of right knee  Treatment Diagnosis: M25.561 Pain in Right Knee  Insurance/Certification information:  PT Insurance Information: Amrit [employer]ish [employer]  Physician Information:  Referring Practitioner: Ishaan Lewis MD  Has the plan of care been signed (Y/N):        []  Yes  [x]  No     Date of Patient follow up with Physician:       Is this a Progress Report:     []  Yes  [x]  No        If Yes:  Date Range for reporting period:  Beginning 3/11/2020  Ending     Progress report will be due (10 Rx or 30 days whichever is less): 9927      Recertification will be due (POC Duration  / 90 days whichever is less):          Visit # Insurance Allowable Auth Required   1  3/11 20 []  Yes [x]  No        Functional Scale: LEFS: 23.75% deficit   Date assessed:  3/11/2020       Latex Allergy:  [x]NO      []YES   Preferred Language for Healthcare:   [x]English       []other:     Pain level:  2-5/10   3/11     SUBJECTIVE:  See eval   3/11     OBJECTIVE:   Flexibility  3/11 L R Comment   Hamstring  Moderate restriction    Gastroc  Moderate restriction     ITB      Quad              ROM  3/11 PROM AROM Overpressure Comment    L R L R L R    Flexion   111 94      Extension   0 0                              Strength  3/11 L R Comment   Quad 5 4+ pain present    Hamstring 5 4+    Gastroc 5 4+    Hip  flexion 5 4+ pain present    Hip abd                      Special Test  3/11 Results/Comment   Meniscal Click    Crepitus positive   Flexion Test    Valgus Laxity    Varus Laxity    Lachmans    Drop Back    Homans improving soft tissue extensibility and allowing for proper ROM for normal function with self care, mobility, lifting and ambulation. Modalities:  Ice 15 min  3/11    Charges:  Timed Code Treatment Minutes: 30 + EVAL   Total Treatment Minutes: 70       [] EVAL (LOW) 51969 (typically 20 minutes face-to-face)  [x] EVAL (MOD) 49679 (typically 30 minutes face-to-face)  [] EVAL (HIGH) 47087 (typically 45 minutes face-to-face)  [] RE-EVAL   [x] RO(30496) x 1    [] IONTO  [x] NMR (27465) x 1    [] VASO  [] Manual (85350) x      [] Other:  [] TA x      [] Mech Traction (74720)  [] ES(attended) (85495)      [] ES (un) (73884):     GOALS:   Patient stated goal: Be able to get in and out of car; Get back to gym and be active   [] Progressing: [] Met: [] Not Met: [] Adjusted    Therapist goals for Patient:   Short Term Goals: To be achieved in: 2 weeks  1. Independent in HEP and progression per patient tolerance, in order to prevent re-injury. [] Progressing: [] Met: [] Not Met: [] Adjusted   2. Patient will have a decrease in pain to facilitate improvement in movement, function, and ADLs as indicated by Functional Deficits. [] Progressing: [] Met: [] Not Met: [] Adjusted    Long Term Goals: To be achieved in: 4-6 weeks  1. Disability index score of 15% or less for the LEFS to assist with reaching prior level of function. [] Progressing: [] Met: [] Not Met: [] Adjusted  2. Patient will demonstrate increased AROM to [facility] to allow for proper joint functioning as indicated by patients Functional Deficits. [] Progressing: [] Met: [] Not Met: [] Adjusted  3. Patient will demonstrate an increase in Strength to good proximal hip strength and control  in LE to allow for proper functional mobility as indicated by patients Functional Deficits. [] Progressing: [] Met: [] Not Met: [] Adjusted  4. Patient will return to Independent functional activities without increased symptoms or restriction.    [] Progressing: [] Met: []

## 2020-03-11 NOTE — PLAN OF CARE
[]Signs/symptoms consistent with tendinitis/tendinosis    []signs/symptoms consistent with pathology which may benefit from Dry needling      []other:      Prognosis/Rehab Potential:      []Excellent   [x]Good    []Fair   []Poor    Tolerance of evaluation/treatment:    []Excellent   [x]Good    []Fair   []Poor    Physical Therapy Evaluation Complexity Justification  [x] A history of present problem with:  [] no personal factors and/or comorbidities that impact the plan of care;  []1-2 personal factors and/or comorbidities that impact the plan of care  [x]3 personal factors and/or comorbidities that impact the plan of care  [x] An examination of body systems using standardized tests and measures addressing any of the following: body structures and functions (impairments), activity limitations, and/or participation restrictions;:  [] a total of 1-2 or more elements   [x] a total of 3 or more elements   [] a total of 4 or more elements   [x] A clinical presentation with:  [] stable and/or uncomplicated characteristics   [x] evolving clinical presentation with changing characteristics  [] unstable and unpredictable characteristics;   [x] Clinical decision making of [] low, [x] moderate, [] high complexity using standardized patient assessment instrument and/or measurable assessment of functional outcome. [] EVAL (LOW) 81719 (typically 20 minutes face-to-face)  [x] EVAL (MOD) 96835 (typically 30 minutes face-to-face)  [] EVAL (HIGH) 59068 (typically 45 minutes face-to-face)  [] RE-EVAL       PLAN  Frequency/Duration:  1-2 days per week for 4-6 Weeks:  Interventions:  [x]  Therapeutic exercise including: strength training, ROM, for Lower extremity and core   [x]  NMR activation and proprioception for LE, Glutes and Core   [x]  Manual therapy as indicated for LE, Hip and spine to include: Dry Needling/IASTM, STM, PROM, Gr I-IV mobilizations, manipulation.    [x] Modalities as needed that may include: thermal agents, E-stim, Biofeedback, US, iontophoresis as indicated  [x] Patient education on joint protection, postural re-education, activity modification, progression of HEP. HEP instruction: Provided and reviewed with patient (see scanned forms)    GOALS:   Patient stated goal: Be able to get in and out of car; Get back to gym and be active   [] Progressing: [] Met: [] Not Met: [] Adjusted    Therapist goals for Patient:   Short Term Goals: To be achieved in: 2 weeks  1. Independent in HEP and progression per patient tolerance, in order to prevent re-injury. [] Progressing: [] Met: [] Not Met: [] Adjusted   2. Patient will have a decrease in pain to facilitate improvement in movement, function, and ADLs as indicated by Functional Deficits. [] Progressing: [] Met: [] Not Met: [] Adjusted    Long Term Goals: To be achieved in: 4-6 weeks  1. Disability index score of 15% or less for the LEFS to assist with reaching prior level of function. [] Progressing: [] Met: [] Not Met: [] Adjusted  2. Patient will demonstrate increased AROM to [facility] to allow for proper joint functioning as indicated by patients Functional Deficits. [] Progressing: [] Met: [] Not Met: [] Adjusted  3. Patient will demonstrate an increase in Strength to good proximal hip strength and control  in LE to allow for proper functional mobility as indicated by patients Functional Deficits. [] Progressing: [] Met: [] Not Met: [] Adjusted  4. Patient will return to Independent functional activities without increased symptoms or restriction. [] Progressing: [] Met: [] Not Met: [] Adjusted       Electronically signed by:  Elias Primrose, PT, DPT    Note: If patient does not return for scheduled/ recommended follow up visits, this note will serve as a discharge from care along with most recent update on progress.

## 2020-03-16 ENCOUNTER — HOSPITAL ENCOUNTER (OUTPATIENT)
Dept: PHYSICAL THERAPY | Age: 55
Setting detail: THERAPIES SERIES
Discharge: HOME | End: 2020-03-16
Payer: COMMERCIAL

## 2020-03-16 PROCEDURE — 97112 NEUROMUSCULAR REEDUCATION: CPT

## 2020-03-16 PROCEDURE — 97110 THERAPEUTIC EXERCISES: CPT

## 2020-03-16 NOTE — FLOWSHEET NOTE
[facility] 130 [address] [facility], [address] [facility], [facility]  Phone: [phone]   Fax:     [phone]    Physical Therapy Daily Treatment Note  Date:  3/16/2020    Patient Name:  Cami Mccord    :  1965  MRN: [id number]  Restrictions/Precautions:    Medical/Treatment Diagnosis Information:  Diagnosis: M17.11 (ICD-10-CM) - Arthritis of right knee  Treatment Diagnosis: M25.561 Pain in Right Knee  Insurance/Certification information:  PT Insurance Information: Amrit [employer]  Physician Information:  Referring Practitioner: Tiffany Lopes MD  Has the plan of care been signed (Y/N):        []  Yes  [x]  No     Date of Patient follow up with Physician:       Is this a Progress Report:     []  Yes  [x]  No        If Yes:  Date Range for reporting period:  Beginning 3/11/2020  Ending     Progress report will be due (10 Rx or 30 days whichever is less):       Recertification will be due (POC Duration  / 90 days whichever is less):          Visit # Insurance Allowable Auth Required   2  3/16 20 []  Yes [x]  No        Functional Scale: LEFS: 23.75% deficit   Date assessed:  3/11/2020       Latex Allergy:  [x]NO      []YES   Preferred Language for Healthcare:   [x]English       []other:     Pain level:  5/10   3/16     SUBJECTIVE:  3/16 Patient states that she is doing a little better.      OBJECTIVE:   Flexibility  3/11 L R Comment   Hamstring  Moderate restriction    Gastroc  Moderate restriction     ITB      Quad              ROM  3/11 PROM AROM Overpressure Comment    L R L R L R    Flexion   111 94      Extension   0 0                              Strength  3/11 L R Comment   Quad 5 4+ pain present    Hamstring 5 4+    Gastroc 5 4+    Hip  flexion 5 4+ pain present    Hip abd                      Special Test  3/11 Results/Comment   Meniscal Click    Crepitus positive   Flexion Test    Valgus Laxity    Varus Laxity    Lachmans Independent functional activities without increased symptoms or restriction. [] Progressing: [] Met: [] Not Met: [] Adjusted    Overall Progression Towards Functional goals/ Treatment Progress Update:  [] Patient is progressing as expected towards functional goals listed. [] Progression is slowed due to complexities/Impairments listed. [] Progression has been slowed due to co-morbidities. [x] Plan just implemented, too soon to assess goals progression <30days   [] Goals require adjustment due to lack of progress  [] Patient is not progressing as expected and requires additional follow up with physician  [] Other    Prognosis for POC: [x] Good [] Fair  [] Poor      Patient requires continued skilled intervention: [x] Yes  [] No    Treatment/Activity Tolerance:  [x] Patient able to complete treatment  [] Patient limited by fatigue  [] Patient limited by pain     [] Patient limited by other medical complications  [x] Other: 3/16 Patient tolerated treatment well this session. Fatigued and challenged by addition of exercises. HEP updated and reviewed. Patient reported feeling better at end of session. Continue to progress as tolerated. Patient Education:                  PLAN: See eval  [x] Continue per plan of care [] Alter current plan (see comments above)  [] Plan of care initiated [] Hold pending MD visit [] Discharge      Electronically signed by:  Elias Primrose, PT, DPT    Note: If patient does not return for scheduled/ recommended follow up visits, this note will serve as a discharge from care along with most recent update on progress.

## 2020-03-18 ENCOUNTER — HOSPITAL ENCOUNTER (OUTPATIENT)
Dept: PHYSICAL THERAPY | Age: 55
Setting detail: THERAPIES SERIES
Discharge: HOME | End: 2020-03-18
Payer: COMMERCIAL

## 2020-03-18 PROCEDURE — 97112 NEUROMUSCULAR REEDUCATION: CPT

## 2020-03-18 PROCEDURE — 97110 THERAPEUTIC EXERCISES: CPT

## 2020-03-18 NOTE — FLOWSHEET NOTE
[address] [facility] [address] [facility], [address] [place] [address] Ave, [place]uel[place]  Phone: [phone]   Fax:     [phone]    Physical Therapy Daily Treatment Note  Date:  3/18/2020    Patient Name:  Ashli Henry    :  1965  MRN: [id number]  Restrictions/Precautions:    Medical/Treatment Diagnosis Information:  Diagnosis: M17.11 (ICD-10-CM) - Arthritis of right knee  Treatment Diagnosis: M25.561 Pain in Right Knee  Insurance/Certification information:  PT Insurance Information: Teresa Gil  Physician Information:  Referring Practitioner: Sebastián Simon MD  Has the plan of care been signed (Y/N):        []  Yes  [x]  No     Date of Patient follow up with Physician:       Is this a Progress Report:     []  Yes  [x]  No        If Yes:  Date Range for reporting period:  Beginning 3/11/2020  Ending     Progress report will be due (10 Rx or 30 days whichever is less):       Recertification will be due (POC Duration  / 90 days whichever is less):          Visit # Insurance Allowable Auth Required   3  3/18 20 []  Yes [x]  No        Functional Scale: LEFS: 23.75% deficit   Date assessed:  3/11/2020       Latex Allergy:  [x]NO      []YES   Preferred Language for Healthcare:   [x]English       []other:     Pain level:  3-4/10   3/18     SUBJECTIVE:  3/18 Patient states that she is doing a little better.  She states that she is walking better     OBJECTIVE:   Flexibility  3/11 L R Comment   Hamstring  Moderate restriction    Gastroc  Moderate restriction     ITB      Quad              ROM  3/11 PROM AROM Overpressure Comment    L R L R L R    Flexion   111 94      Extension   0 0                              Strength  3/11 L R Comment   Quad 5 4+ pain present    Hamstring 5 4+    Gastroc 5 4+    Hip  flexion 5 4+ pain present    Hip abd                      Special Test  3/11 Results/Comment   Meniscal Click    Crepitus positive   Flexion Test Valgus Laxity    Varus Laxity    Lachmans    Drop Back    Homans          Reflexes/Sensation: 3/11   [x]Dermatomes/Myotomes intact    []Reflexes equal and normal bilaterally   []Other:    Joint mobility: 3/11    [x]Normal    []Hypo   []Hyper    Palpation: TTP along patellar tendon  3/11    Functional Mobility/Transfers: Independent with increased pain; Pain with transfers after an extended period of time; pain with sitting and standing for an extended period of time; pain with walking greater than 15 minutes; unable to ascend/descend stairs with a normalized reciprocal gait pattern  3/11    Posture: WNL  3/11    Gait: (include devices/WB status) Antalgic gait present  3/11                        [x] Patient history, allergies, meds reviewed. Medical chart reviewed. See intake form. 3/11    Review Of Systems (ROS):  [x]Performed Review of systems (Integumentary, CardioPulmonary, Neurological) by intake and observation. Intake form has been scanned into medical record. Patient has been instructed to contact their primary care physician regarding ROS issues if not already being addressed at this time.  3/11      RESTRICTIONS/PRECAUTIONS:  R Knee OA    Exercises/Interventions:   Exercise/Equipment Resistance/Repetitions Other comments   Stretching     Hamstring 30 sec x 5 start 3/11   Towel Pull 30 sec x 5 start 3/11   Inclined Calf     Hip Flexion     ITB     Groin     Quad                    SLR     Supine  3 x 10 Start 3/16   Abduction SL 3 x 10 Start 3/18   Abduction (standing) 3 x 10 Start 3/16   Adduction     Prone     SLR+          Isometrics     Quad sets 10 sec x 10 start 3/11   Adduction sets 10 sec x 10 start 3/11   Patellar Glides     Medial     Superior     Inferior          ROM     Sheet Pulls 10 sec x 10 start 3/11   Hang Weights     Passive     Active     Weight Shift     Ankle Pumps                    CKC     Calf raises 3 x 10 Start 3/16   Wall sits     Step ups     1 leg stand     Squatting     CC Adjusted  3. Patient will demonstrate an increase in Strength to good proximal hip strength and control  in LE to allow for proper functional mobility as indicated by patients Functional Deficits. [] Progressing: [] Met: [] Not Met: [] Adjusted  4. Patient will return to Independent functional activities without increased symptoms or restriction. [] Progressing: [] Met: [] Not Met: [] Adjusted    Overall Progression Towards Functional goals/ Treatment Progress Update:  [] Patient is progressing as expected towards functional goals listed. [] Progression is slowed due to complexities/Impairments listed. [] Progression has been slowed due to co-morbidities. [x] Plan just implemented, too soon to assess goals progression <30days   [] Goals require adjustment due to lack of progress  [] Patient is not progressing as expected and requires additional follow up with physician  [] Other    Prognosis for POC: [x] Good [] Fair  [] Poor      Patient requires continued skilled intervention: [x] Yes  [] No    Treatment/Activity Tolerance:  [x] Patient able to complete treatment  [] Patient limited by fatigue  [] Patient limited by pain     [] Patient limited by other medical complications  [x] Other: 3/18 Patient tolerated treatment well this session. Fatigued and challenged by addition of exercises. HEP updated and reviewed. Patient reported feeling better at end of session. Continue to progress as tolerated. Patient Education:                  PLAN: See eval  [x] Continue per plan of care [] Alter current plan (see comments above)  [] Plan of care initiated [] Hold pending MD visit [] Discharge      Electronically signed by:  Jerry Dueñas, PT, DPT    Note: If patient does not return for scheduled/ recommended follow up visits, this note will serve as a discharge from care along with most recent update on progress.

## 2020-04-13 RX ORDER — LANCETS 33 GAUGE
EACH MISCELLANEOUS
Qty: 100 EACH | Refills: 10 | Status: SHIPPED | OUTPATIENT
Start: 2020-04-13 | End: 2021-06-23 | Stop reason: SDUPTHER

## 2020-04-13 NOTE — TELEPHONE ENCOUNTER
Last appointment: 3/9/2020  Next appointment: 7/6/2020  Last refill: 01/02/2019 # 100 with 10 refills

## 2020-05-19 ENCOUNTER — OFFICE VISIT (OUTPATIENT)
Dept: DERMATOLOGY | Age: 55
End: 2020-05-19
Payer: COMMERCIAL

## 2020-05-19 PROBLEM — L66.8 CENTRAL CENTRIFUGAL SCARRING ALOPECIA: Status: ACTIVE | Noted: 2020-05-19

## 2020-05-19 PROBLEM — L66.81 CENTRAL CENTRIFUGAL SCARRING ALOPECIA: Status: ACTIVE | Noted: 2020-05-19

## 2020-05-19 PROBLEM — L66.9 CENTRAL CENTRIFUGAL SCARRING ALOPECIA: Status: ACTIVE | Noted: 2020-05-19

## 2020-05-19 PROCEDURE — 99202 OFFICE O/P NEW SF 15 MIN: CPT | Performed by: DERMATOLOGY

## 2020-06-04 RX ORDER — GLIMEPIRIDE 4 MG/1
8 TABLET ORAL
Qty: 60 TABLET | Refills: 5 | Status: SHIPPED | OUTPATIENT
Start: 2020-06-04 | End: 2020-06-10

## 2020-06-10 RX ORDER — GLIMEPIRIDE 4 MG/1
4 TABLET ORAL 2 TIMES DAILY
Qty: 60 TABLET | Refills: 5
Start: 2020-06-10 | End: 2020-08-06

## 2020-06-11 ENCOUNTER — OFFICE VISIT (OUTPATIENT)
Dept: SLEEP MEDICINE | Age: 55
End: 2020-06-11
Payer: COMMERCIAL

## 2020-06-11 VITALS
DIASTOLIC BLOOD PRESSURE: 84 MMHG | TEMPERATURE: 98.2 F | RESPIRATION RATE: 18 BRPM | OXYGEN SATURATION: 96 % | HEART RATE: 60 BPM | BODY MASS INDEX: 48.87 KG/M2 | SYSTOLIC BLOOD PRESSURE: 140 MMHG | HEIGHT: 62 IN | WEIGHT: 265.6 LBS

## 2020-06-11 PROCEDURE — 99213 OFFICE O/P EST LOW 20 MIN: CPT | Performed by: PSYCHIATRY & NEUROLOGY

## 2020-06-11 ASSESSMENT — SLEEP AND FATIGUE QUESTIONNAIRES
HOW LIKELY ARE YOU TO NOD OFF OR FALL ASLEEP WHILE LYING DOWN TO REST IN THE AFTERNOON WHEN CIRCUMSTANCES PERMIT: 0
HOW LIKELY ARE YOU TO NOD OFF OR FALL ASLEEP WHILE SITTING INACTIVE IN A PUBLIC PLACE: 0
ESS TOTAL SCORE: 1
HOW LIKELY ARE YOU TO NOD OFF OR FALL ASLEEP WHILE WATCHING TV: 0
HOW LIKELY ARE YOU TO NOD OFF OR FALL ASLEEP WHILE SITTING AND READING: 0
HOW LIKELY ARE YOU TO NOD OFF OR FALL ASLEEP WHEN YOU ARE A PASSENGER IN A CAR FOR AN HOUR WITHOUT A BREAK: 1
HOW LIKELY ARE YOU TO NOD OFF OR FALL ASLEEP WHILE SITTING QUIETLY AFTER LUNCH WITHOUT ALCOHOL: 0
HOW LIKELY ARE YOU TO NOD OFF OR FALL ASLEEP IN A CAR, WHILE STOPPED FOR A FEW MINUTES IN TRAFFIC: 0
HOW LIKELY ARE YOU TO NOD OFF OR FALL ASLEEP WHILE SITTING AND TALKING TO SOMEONE: 0

## 2020-06-11 ASSESSMENT — ENCOUNTER SYMPTOMS
CHOKING: 0
GASTROINTESTINAL NEGATIVE: 1
APNEA: 0

## 2020-06-11 NOTE — PROGRESS NOTES
11/8/19  Yes Philip Hagen MD   metFORMIN (GLUCOPHAGE-XR) 500 MG extended release tablet TAKE FOUR TABLETS BY MOUTH DAILY WITH BREAKFAST 11/8/19  Yes Philip Hagen MD   valsartan-hydrochlorothiazide (DIOVAN-HCT) 320-25 MG per tablet TAKE ONE TABLET BY MOUTH DAILY 11/8/19  Yes Philip Hagen MD   Cholecalciferol (VITAMIN D3) 1000 units TABS Take 2 tablets by mouth daily 9/9/19  Yes Philip Hagen MD   fexofenadine (ALLEGRA ALLERGY) 180 MG tablet Take 1 tablet by mouth daily Start after completion of Allegra-D 9/9/19  Yes Philip Hagen MD   fluticasone (FLONASE) 50 MCG/ACT nasal spray 1 spray by Nasal route daily 9/9/19  Yes Philip Hagen MD   ONE TOUCH ULTRA TEST strip USE TO TEST THREE TIMES A DAY 8/5/19  Yes Philip Hagen MD   Blood Glucose Monitoring Suppl [facility] BLOOD GLUCOSE METER) w/Device KIT Inject 1 Applicatorful into the skin 2 times daily (before meals) 10/5/18  Yes Michell All, DO   Multiple Vitamins-Minerals (THERAPEUTIC MULTIVITAMIN-MINERALS) tablet Take 1 tablet by mouth daily   Yes Historical Provider, MD       Allergies as of 06/11/2020 - Review Complete 06/11/2020   Allergen Reaction Noted    Prednisone Other (See Comments) 09/26/2012       Patient Active Problem List   Diagnosis    Hyperlipidemia    Essential hypertension, benign    ZA (obstructive sleep apnea)    Perimenopause    Vitamin D deficiency    Left ankle pain    Type 2 diabetes mellitus with microalbuminuria ([address]    Knee pain, chronic    Iron deficiency anemia due to chronic blood loss    Mild intermittent asthma without complication    Colon polyps    Abnormal nuclear cardiac imaging test    Elevated serum protein level    Allergic rhinitis    Diabetes mellitus type 2, uncontrolled, with complications ([address]    Essential hypertension    Alopecia    Central centrifugal scarring alopecia       Past Medical History:   Diagnosis Date

## 2020-06-29 RX ORDER — FLUTICASONE PROPIONATE 50 MCG
1 SPRAY, SUSPENSION (ML) NASAL DAILY
Qty: 1 BOTTLE | Refills: 5 | Status: ON HOLD
Start: 2020-06-29 | End: 2020-11-06 | Stop reason: CLARIF

## 2020-07-06 ENCOUNTER — VIRTUAL VISIT (OUTPATIENT)
Dept: INTERNAL MEDICINE CLINIC | Age: 55
End: 2020-07-06

## 2020-07-06 ENCOUNTER — TELEMEDICINE (OUTPATIENT)
Dept: INTERNAL MEDICINE CLINIC | Age: 55
End: 2020-07-06
Payer: COMMERCIAL

## 2020-07-06 ENCOUNTER — TELEPHONE (OUTPATIENT)
Dept: INTERNAL MEDICINE CLINIC | Age: 55
End: 2020-07-06

## 2020-07-06 PROCEDURE — 99214 OFFICE O/P EST MOD 30 MIN: CPT | Performed by: INTERNAL MEDICINE

## 2020-07-06 NOTE — PROGRESS NOTES
 Mild intermittent asthma without complication    Colon polyps    Abnormal nuclear cardiac imaging test    Elevated serum protein level    Allergic rhinitis    Diabetes mellitus type 2, uncontrolled, with complications (HCC)    Essential hypertension    Alopecia    Central centrifugal scarring alopecia       Current Outpatient Medications   Medication Sig Dispense Refill    dapagliflozin (FARXIGA) 10 MG tablet Take 1 tablet by mouth every morning 30 tablet 0    fluticasone (FLONASE) 50 MCG/ACT nasal spray 1 spray by Nasal route daily 1 Bottle 5    glimepiride (AMARYL) 4 MG tablet Take 1 tablet by mouth 2 times daily 60 tablet 5    OneTouch Delica Lancets 83B MISC Test 3 times daily E11.9 100 each 10    spironolactone (ALDACTONE) 25 MG tablet Take 1 tablet by mouth daily 30 tablet 3    SITagliptin (JANUVIA) 100 MG tablet Take 1 tablet by mouth daily 30 tablet 3    atorvastatin (LIPITOR) 40 MG tablet Take 1 tablet by mouth daily Replaces Simvastatin (due to drug interaction with Amlodipine) 30 tablet 5    atenolol (TENORMIN) 50 MG tablet Take 1 tablet by mouth daily 30 tablet 5    amLODIPine (NORVASC) 10 MG tablet TAKE ONE TABLET BY MOUTH DAILY 30 tablet 5    metFORMIN (GLUCOPHAGE-XR) 500 MG extended release tablet TAKE FOUR TABLETS BY MOUTH DAILY WITH BREAKFAST 120 tablet 5    valsartan-hydrochlorothiazide (DIOVAN-HCT) 320-25 MG per tablet TAKE ONE TABLET BY MOUTH DAILY 30 tablet 5    Cholecalciferol (VITAMIN D3) 1000 units TABS Take 2 tablets by mouth daily      fexofenadine (ALLEGRA ALLERGY) 180 MG tablet Take 1 tablet by mouth daily Start after completion of Allegra-D 30 tablet 5    ONE TOUCH ULTRA TEST strip USE TO TEST THREE TIMES A DAY 50 strip 2    Blood Glucose Monitoring Suppl (ACURA BLOOD GLUCOSE METER) w/Device KIT Inject 1 Applicatorful into the skin 2 times daily (before meals) 1 kit 0    Multiple Vitamins-Minerals (THERAPEUTIC MULTIVITAMIN-MINERALS) tablet Take 1 tablet by mouth daily       No current facility-administered medications for this visit. NUTRITION ASSESSMENT    Biochemical Data:    Lab Results   Component Value Date    LABA1C 11.8 03/09/2020     Lab Results   Component Value Date    .0 03/09/2020       Lab Results   Component Value Date    CHOL 188 03/09/2020    CHOL 144 06/13/2017    CHOL 188 06/01/2017     Lab Results   Component Value Date    TRIG 117 03/09/2020    TRIG 86 06/13/2017    TRIG 87 06/01/2017     Lab Results   Component Value Date    HDL 40 03/09/2020    HDL 50 05/06/2019    HDL 44 04/05/2018     Lab Results   Component Value Date    LDLCALC 125 (H) 03/09/2020    LDLCALC 86 06/13/2017    LDLCALC 126 (H) 06/01/2017     Lab Results   Component Value Date    LABVLDL 23 03/09/2020    LABVLDL 17 06/13/2017    LABVLDL 17 06/01/2017     No results found for: CHOLHDLRATIO    Lab Results   Component Value Date    WBC 7.2 11/16/2017    HGB 12.4 11/16/2017    HCT 37.2 11/16/2017    MCV 79.7 (L) 11/16/2017     11/16/2017       Lab Results   Component Value Date    CREATININE 0.9 05/28/2020    BUN 19 05/28/2020     05/28/2020    K 4.8 05/28/2020    CL 98 (L) 05/28/2020    CO2 28 05/28/2020       Anthropometric Measurements: Wt Change since last visit:  Yes  Comments: Wt Readings from Last 3 Encounters:   06/11/20 265 lb 9.6 oz (120.5 kg)   05/28/20 261 lb (118.4 kg)   03/24/20 272 lb (123.4 kg)         Food and Nutrition Changes:   B: leftover salmon, asparagus, mashed potatoes  L; watermelon  D: hot met  Beverage consumption:changes: No  Patient reported changes:  No     Physical Activity Changes:   Increased frequency, intensity or length of time? No  Obstacles to activity: bad habit    Diabetes Medications:   Recent change in medication type/dosage: Yes  Comments: Looking into discount for [employer], but still $200 per month    Monitoring:   Changes to testing regimen?  No  Recent results: none available    Barriers:   -Sahil Llanos has been unusable due to remodeling, now back in order, but suffering from stress of working from home/COVID quarantine        Follow Up Plan: 2 weeks    Referring Provider: Kylee Jeronimo MD    Time spent with patient: 30 minutes

## 2020-07-06 NOTE — PATIENT INSTRUCTIONS
Call for Abena Sinclair and if okay, call me back and will do 28 days of samples.     Call Lidia Pompa for nutrition advice    Mammogram due  [id number]

## 2020-07-06 NOTE — PATIENT INSTRUCTIONS
NEW BEHAVIOR GOALS    Plan meals in advance to follow plate guide and control carb intake.  Aim for 30 g carb per meal, 15 to 20 g carb per snack    Walk on treadmill every day at 6 pm, even if just for 5 minutes

## 2020-07-06 NOTE — PROGRESS NOTES
2020    TELEHEALTH EVALUATION -- Audio/Visual (During GQOOB-13 public health emergency)    HPI:    Tuyet Weston (:  1965) has requested an audio/video evaluation for the following concern(s):    Discuss diabetes control. Last A1c was quite elevated at 11.8. She has generally preferred to take pills and is not enthusiastic about starting insulin. Ports her blood pressure is doing better. She did see the nephrologist because she is on multiple medications to achieve her blood pressure control. Review of Systems   Denies shortness of breath, cough, fever or myalgias      Prior to Visit Medications    Medication Sig Taking?  Authorizing Provider   dapagliflozin (FARXIGA) 10 MG tablet Take 1 tablet by mouth every morning Yes Julieta Herrera MD   fluticasone (FLONASE) 50 MCG/ACT nasal spray 1 spray by Nasal route daily Yes Cristhian Spicer MD   glimepiride (AMARYL) 4 MG tablet Take 1 tablet by mouth 2 times daily Yes Julieta Judd MD   OneTouch Delica Lancets 87D MISC Test 3 times daily E11.9 Yes Cristhian Spicer MD   spironolactone (ALDACTONE) 25 MG tablet Take 1 tablet by mouth daily Yes Zach Lam MD   SITagliptin (JANUVIA) 100 MG tablet Take 1 tablet by mouth daily Yes Cristhian Spicer MD   atorvastatin (LIPITOR) 40 MG tablet Take 1 tablet by mouth daily Replaces Simvastatin (due to drug interaction with Amlodipine) Yes Cristhian Spicer MD   atenolol (TENORMIN) 50 MG tablet Take 1 tablet by mouth daily Yes Cristhian Spicer MD   Cholecalciferol (VITAMIN D3) 1000 units TABS Take 2 tablets by mouth daily Yes Cristhian Spicer MD   fexofenadine (ALLEGRA ALLERGY) 180 MG tablet Take 1 tablet by mouth daily Start after completion of Allegra-D Yes Cristhian Spicer MD   ONE TOUCH ULTRA TEST strip USE TO TEST THREE TIMES A DAY Yes Cristhian Spicer MD   Blood Glucose Monitoring Suppl [facility] BLOOD GLUCOSE METER) w/Device KIT Inject 1 Applicatorful into the skin 2 times daily (before meals) Yes Evette Garcia, DO   Multiple Vitamins-Minerals (THERAPEUTIC MULTIVITAMIN-MINERALS) tablet Take 1 tablet by mouth daily Yes Historical Provider, MD   metFORMIN (GLUCOPHAGE-XR) 500 MG extended release tablet TAKE FOUR TABLETS BY MOUTH DAILY WITH BREAKFAST  Julieta Herrera MD   amLODIPine (NORVASC) 10 MG tablet TAKE ONE TABLET BY MOUTH DAILY  Jose Sanchez MD   valsartan-hydrochlorothiazide (DIOVAN-HCT) 320-25 MG per tablet TAKE ONE TABLET BY MOUTH DAILY  Jose Sanchez MD       Social History     Tobacco Use    Smoking status: Never Smoker    Smokeless tobacco: Never Used    Tobacco comment: passive smoke exposure    Substance Use Topics    Alcohol use: Yes     Alcohol/week: 2.0 standard drinks     Types: 2 Glasses of wine per week     Frequency: 2-4 times a month     Drinks per session: 1 or 2     Binge frequency: Never     Comment: occasionally     Drug use: No        Past Medical History:   Diagnosis Date    Allergic rhinitis     Alopecia     [facility] 42/31/0166    TYPE 2    Essential hypertension     Fibroids, intramural 6/8/2011    History of uterine fibroid     Hyperlipidemia     ZA (obstructive sleep apnea) 03/01/2011    [facility] sleep eval 2/20    Vitamin D deficiency 5/22/2012       PHYSICAL EXAMINATION:    Constitutional: [x] Appears well-developed and well-nourished [x] No apparent distress        Mental status  [x] Alert and awake  [x] Oriented to person/place/time [x]Able to follow commands      Eyes:  EOM    [x]  Normal   Sclera  [x]  Normal     HENT:   [x] Normocephalic, atraumatic.     Mouth/Throat: Mucous membranes are moist.     External Ears [x] Normal      Neck: [x] No visualized mass     Pulmonary/Chest: [x] Respiratory effort normal.  [x] No visualized signs of difficulty breathing or respiratory Patient identification was verified at the start of the visit: {YES    Total time spent on this encounter: 25 min    Services were provided through a video synchronous discussion virtually to substitute for in-person clinic visit. Patient and provider were located at their individual homes. --Christine Garcia MD on 7/8/2020 at 11:17 PM    An electronic signature was used to authenticate this note.

## 2020-07-17 ENCOUNTER — TELEPHONE (OUTPATIENT)
Dept: INTERNAL MEDICINE CLINIC | Age: 55
End: 2020-07-17

## 2020-07-17 NOTE — TELEPHONE ENCOUNTER
CLINICAL PHARMACY: ADHERENCE REVIEW  Identified care gap per anthem: Statin adherence    Last Office Visit: 7/6/20    Patient also appears to be taking: atorvastatin 40mg valsartan hctz    ASSESSMENT  ACE/ARB ADHERENCE    Per Jamal Conti   Valsartan last filled on 5/22/20 for a 30 day supply. 5 refills remaining. Billed through [facility]    BP Readings from Last 3 Encounters:   06/11/20 (!) 140/84   05/28/20 130/88   03/24/20 (!) 141/81     Estimated Creatinine Clearance: 89 mL/min (based on SCr of 0.9 mg/dL). STATIN ADHERENCE  PDC: 66%     Atorvastatin last filled on 6/28/20 for a 30 day supply. 2 refills remaining. Billed through [facility]    Lab Results   Component Value Date    CHOL 188 03/09/2020    TRIG 117 03/09/2020    HDL 40 03/09/2020    LDLCALC 125 (H) 03/09/2020    LDLDIRECT 142 (H) 05/06/2019     ALT   Date Value Ref Range Status   03/09/2020 18 10 - 40 U/L Final     AST   Date Value Ref Range Status   03/09/2020 14 (L) 15 - 37 U/L Final     The 10-year ASCVD risk score (Malena Mancilla, et al., 2013) is: 18.1%    Values used to calculate the score:      Age: 49 years      Sex: Female      Is Non- : Yes      Diabetic: Yes      Tobacco smoker: No      Systolic Blood Pressure: 799 mmHg      Is BP treated: Yes      HDL Cholesterol: 40 mg/dL      Total Cholesterol: 188 mg/dL     PLAN  Reached patient to review. Patient appears to be adherent and would like to switch to a 90ds. She eventually would like to switch all her medication but she wants to start here first        [address]: [employer] [place]: Yes  Total # of Interventions Recommended: 3  - New Order #: 0 New Medication Order Reason(s):  Adherence  - Maintenance Safety Lab Monitoring #: 1  - New Therapy Lab Monitoring #: 0  Recommended intervention potential cost savings: 0  Total Interventions Accepted: 0  Time Spent (min): Melissa Hutton

## 2020-07-20 RX ORDER — VALSARTAN AND HYDROCHLOROTHIAZIDE 320; 25 MG/1; MG/1
TABLET, FILM COATED ORAL
Qty: 90 TABLET | Refills: 1 | Status: SHIPPED | OUTPATIENT
Start: 2020-07-20 | End: 2020-12-10 | Stop reason: SDUPTHER

## 2020-07-20 RX ORDER — ATORVASTATIN CALCIUM 40 MG/1
40 TABLET, FILM COATED ORAL DAILY
Qty: 90 TABLET | Refills: 1 | Status: SHIPPED | OUTPATIENT
Start: 2020-07-20 | End: 2021-04-20

## 2020-07-20 NOTE — TELEPHONE ENCOUNTER
Onelia Hare MD    Patient identified as being non-adherent to medication regimen. Patient requests 90-day supplies of atorvastatin and valsartan/HCTZ to improve medication adherence. I have pended refill requests for your convenience. Last OV on 7/6/2020. Next OV on 10/5/2020. Thank you,   Richmond Jennings PharmD, [facility]  Direct: [phone]  Department, toll free [phone], option 7      =======================================================================  Mo,   Please follow pended refill requests and call the pharmacy to ensure these medications are filled for 90-day supply and notify the patient when they are ready for pick-up.      Thank you,   Richmond Jennings PharmD, [facility]  Direct: [phone]  Department, toll free [phone], option 7

## 2020-07-22 ENCOUNTER — TELEPHONE (OUTPATIENT)
Dept: INTERNAL MEDICINE CLINIC | Age: 55
End: 2020-07-22

## 2020-07-22 RX ORDER — INSULIN GLARGINE 100 [IU]/ML
10 INJECTION, SOLUTION SUBCUTANEOUS NIGHTLY
Qty: 5 PEN | Refills: 3 | Status: ON HOLD
Start: 2020-07-22 | End: 2020-11-11 | Stop reason: HOSPADM

## 2020-07-22 NOTE — TELEPHONE ENCOUNTER
Patient called to let Dr. Umer Michele know Michi Parisi was approved by her insurance (lower cost). Please send to [facility] in [place]. Thanks.

## 2020-07-22 NOTE — TELEPHONE ENCOUNTER
Sage Jj MD  To   Eugenia Dumont  Sent   7/7/2020 12:24 PM    I would recommend the LantusSolostar pen. Check with your insurance. Should I take the [place] order off your list?       Previous Messages     ----- Message -----        From:Ramona Orlando   Sent:7/7/2020 11:46 AM EDT          To: Tamy Cespedes MD     Subject:Prescription Question     If I wanted to do the insulin which insulin would you perfer for me to use. The reason I ask is because I am trying to see which is more affordable  the pill or insulin. I asked my insurance company the names of the insulin and they told me to see what insulin you are trying to put me on. I told them that I was trying to see what I can afford because the medicines is so expensive. ---  Please call patient since she never responded to the above message.

## 2020-07-22 NOTE — TELEPHONE ENCOUNTER
Spoke with patient regarding her medications. She is not going to take the [place] as it would cost her $530 monthly. She is going to call her insurance company today to see alternative medications and report.

## 2020-08-03 ENCOUNTER — TELEPHONE (OUTPATIENT)
Dept: INTERNAL MEDICINE CLINIC | Age: 55
End: 2020-08-03

## 2020-08-03 NOTE — TELEPHONE ENCOUNTER
Patient picked up prescription for insulin but does not have needles.  Requesting rx for pen needles be sent to [facility] [address]

## 2020-08-06 RX ORDER — GLIMEPIRIDE 4 MG/1
TABLET ORAL
Qty: 45 TABLET | Refills: 4 | Status: SHIPPED | OUTPATIENT
Start: 2020-08-06 | End: 2021-02-21

## 2020-08-06 NOTE — TELEPHONE ENCOUNTER
Last appointment: 7/6/2020  Next appointment: 10/5/2020  Last refill: script did not go to pharmacy on 6/10/20 was just ADJ SIG

## 2020-11-06 ENCOUNTER — HOSPITAL ENCOUNTER (INPATIENT)
Age: 55
LOS: 5 days | Discharge: HOME | DRG: 177 | End: 2020-11-11
Attending: EMERGENCY MEDICINE
Payer: COMMERCIAL

## 2020-11-06 ENCOUNTER — APPOINTMENT (OUTPATIENT)
Dept: GENERAL RADIOLOGY | Age: 55
DRG: 177 | End: 2020-11-06
Payer: COMMERCIAL

## 2020-11-06 PROBLEM — N17.9 AKI (ACUTE KIDNEY INJURY) (HCC): Status: ACTIVE | Noted: 2020-11-06

## 2020-11-06 PROBLEM — J18.9 PNEUMONIA: Status: ACTIVE | Noted: 2020-11-06

## 2020-11-06 PROBLEM — E87.1 HYPONATREMIA: Status: ACTIVE | Noted: 2020-11-06

## 2020-11-06 PROBLEM — U07.1 COVID-19: Status: ACTIVE | Noted: 2020-11-06

## 2020-11-06 LAB
A/G RATIO: 0.9 (ref 1.1–2.2)
ALBUMIN SERPL-MCNC: 4 G/DL (ref 3.4–5)
ALP BLD-CCNC: 68 U/L (ref 40–129)
ALT SERPL-CCNC: 26 U/L (ref 10–40)
ANION GAP SERPL CALCULATED.3IONS-SCNC: 13 MMOL/L (ref 3–16)
AST SERPL-CCNC: 30 U/L (ref 15–37)
BACTERIA: ABNORMAL /HPF
BACTERIA: ABNORMAL /HPF
BASOPHILS ABSOLUTE: 0 K/UL (ref 0–0.2)
BASOPHILS ABSOLUTE: 0.2 K/UL (ref 0–0.2)
BASOPHILS RELATIVE PERCENT: 0.5 %
BASOPHILS RELATIVE PERCENT: 3.2 %
BILIRUB SERPL-MCNC: 0.4 MG/DL (ref 0–1)
BILIRUBIN URINE: NEGATIVE
BILIRUBIN URINE: NEGATIVE
BLOOD, URINE: ABNORMAL
BLOOD, URINE: ABNORMAL
BUN BLDV-MCNC: 43 MG/DL (ref 7–20)
C-REACTIVE PROTEIN: 62.2 MG/L (ref 0–5.1)
CALCIUM SERPL-MCNC: 9.1 MG/DL (ref 8.3–10.6)
CHLORIDE BLD-SCNC: 93 MMOL/L (ref 99–110)
CLARITY: ABNORMAL
CLARITY: CLEAR
CO2: 22 MMOL/L (ref 21–32)
COLOR: YELLOW
COLOR: YELLOW
CREAT SERPL-MCNC: 1.9 MG/DL (ref 0.6–1.1)
D DIMER: 425 NG/ML DDU (ref 0–229)
D DIMER: 519 NG/ML DDU (ref 0–229)
EKG ATRIAL RATE: 82 BPM
EKG DIAGNOSIS: NORMAL
EKG P AXIS: 57 DEGREES
EKG P-R INTERVAL: 180 MS
EKG Q-T INTERVAL: 346 MS
EKG QRS DURATION: 76 MS
EKG QTC CALCULATION (BAZETT): 404 MS
EKG R AXIS: 57 DEGREES
EKG T AXIS: 47 DEGREES
EKG VENTRICULAR RATE: 82 BPM
EOSINOPHILS ABSOLUTE: 0 K/UL (ref 0–0.6)
EOSINOPHILS ABSOLUTE: 0 K/UL (ref 0–0.6)
EOSINOPHILS RELATIVE PERCENT: 0 %
EOSINOPHILS RELATIVE PERCENT: 0 %
EPITHELIAL CELLS, UA: ABNORMAL /HPF (ref 0–5)
EPITHELIAL CELLS, UA: ABNORMAL /HPF (ref 0–5)
FERRITIN: 670.7 NG/ML (ref 15–150)
FIBRINOGEN: 473 MG/DL (ref 200–397)
FINE CASTS, UA: ABNORMAL /LPF (ref 0–2)
GFR AFRICAN AMERICAN: 33
GFR NON-AFRICAN AMERICAN: 28
GLOBULIN: 4.6 G/DL
GLUCOSE BLD-MCNC: 272 MG/DL (ref 70–99)
GLUCOSE BLD-MCNC: 272 MG/DL (ref 70–99)
GLUCOSE BLD-MCNC: 302 MG/DL (ref 70–99)
GLUCOSE URINE: NEGATIVE MG/DL
GLUCOSE URINE: NEGATIVE MG/DL
HCT VFR BLD CALC: 32.2 % (ref 36–48)
HCT VFR BLD CALC: 34.8 % (ref 36–48)
HEMOGLOBIN: 10.7 G/DL (ref 12–16)
HEMOGLOBIN: 11.7 G/DL (ref 12–16)
INR BLD: 1.22 (ref 0.86–1.14)
KETONES, URINE: NEGATIVE MG/DL
KETONES, URINE: NEGATIVE MG/DL
LACTATE DEHYDROGENASE: 282 U/L (ref 100–190)
LACTIC ACID: 1.2 MMOL/L (ref 0.4–2)
LEUKOCYTE ESTERASE, URINE: NEGATIVE
LEUKOCYTE ESTERASE, URINE: NEGATIVE
LYMPHOCYTES ABSOLUTE: 0.9 K/UL (ref 1–5.1)
LYMPHOCYTES ABSOLUTE: 1.7 K/UL (ref 1–5.1)
LYMPHOCYTES RELATIVE PERCENT: 18 %
LYMPHOCYTES RELATIVE PERCENT: 36.2 %
MCH RBC QN AUTO: 27 PG (ref 26–34)
MCH RBC QN AUTO: 27.3 PG (ref 26–34)
MCHC RBC AUTO-ENTMCNC: 33.2 G/DL (ref 31–36)
MCHC RBC AUTO-ENTMCNC: 33.5 G/DL (ref 31–36)
MCV RBC AUTO: 80.6 FL (ref 80–100)
MCV RBC AUTO: 82.1 FL (ref 80–100)
MICROSCOPIC EXAMINATION: YES
MICROSCOPIC EXAMINATION: YES
MONOCYTES ABSOLUTE: 0.3 K/UL (ref 0–1.3)
MONOCYTES ABSOLUTE: 0.4 K/UL (ref 0–1.3)
MONOCYTES RELATIVE PERCENT: 7 %
MONOCYTES RELATIVE PERCENT: 7.9 %
MUCUS: ABNORMAL /LPF
NEUTROPHILS ABSOLUTE: 2.6 K/UL (ref 1.7–7.7)
NEUTROPHILS ABSOLUTE: 3.6 K/UL (ref 1.7–7.7)
NEUTROPHILS RELATIVE PERCENT: 55.4 %
NEUTROPHILS RELATIVE PERCENT: 71.8 %
NITRITE, URINE: NEGATIVE
NITRITE, URINE: NEGATIVE
PDW BLD-RTO: 14.8 % (ref 12.4–15.4)
PDW BLD-RTO: 14.9 % (ref 12.4–15.4)
PERFORMED ON: ABNORMAL
PERFORMED ON: ABNORMAL
PH UA: 5 (ref 5–8)
PH UA: 6 (ref 5–8)
PLATELET # BLD: 165 K/UL (ref 135–450)
PLATELET # BLD: 203 K/UL (ref 135–450)
PMV BLD AUTO: 8.7 FL (ref 5–10.5)
PMV BLD AUTO: 9.2 FL (ref 5–10.5)
POTASSIUM REFLEX MAGNESIUM: 4.1 MMOL/L (ref 3.5–5.1)
PROTEIN UA: 100 MG/DL
PROTEIN UA: 100 MG/DL
PROTHROMBIN TIME: 14.2 SEC (ref 10–13.2)
RBC # BLD: 3.93 M/UL (ref 4–5.2)
RBC # BLD: 4.31 M/UL (ref 4–5.2)
RBC UA: ABNORMAL /HPF (ref 0–4)
RBC UA: ABNORMAL /HPF (ref 0–4)
REASON FOR REJECTION: NORMAL
REJECTED TEST: NORMAL
S PYO AG THROAT QL: NEGATIVE
SODIUM BLD-SCNC: 128 MMOL/L (ref 136–145)
SPECIFIC GRAVITY UA: 1.02 (ref 1–1.03)
SPECIFIC GRAVITY UA: >=1.03 (ref 1–1.03)
TOTAL PROTEIN: 8.6 G/DL (ref 6.4–8.2)
TROPONIN: <0.01 NG/ML
URINE REFLEX TO CULTURE: ABNORMAL
URINE REFLEX TO CULTURE: ABNORMAL
URINE TYPE: ABNORMAL
URINE TYPE: ABNORMAL
UROBILINOGEN, URINE: 0.2 E.U./DL
UROBILINOGEN, URINE: 0.2 E.U./DL
WBC # BLD: 4.8 K/UL (ref 4–11)
WBC # BLD: 5 K/UL (ref 4–11)
WBC UA: ABNORMAL /HPF (ref 0–5)
WBC UA: ABNORMAL /HPF (ref 0–5)

## 2020-11-06 PROCEDURE — 36415 COLL VENOUS BLD VENIPUNCTURE: CPT

## 2020-11-06 PROCEDURE — 85025 COMPLETE CBC W/AUTO DIFF WBC: CPT

## 2020-11-06 PROCEDURE — 6360000002 HC RX W HCPCS: Performed by: EMERGENCY MEDICINE

## 2020-11-06 PROCEDURE — 2580000003 HC RX 258: Performed by: EMERGENCY MEDICINE

## 2020-11-06 PROCEDURE — 86140 C-REACTIVE PROTEIN: CPT

## 2020-11-06 PROCEDURE — 2580000003 HC RX 258: Performed by: STUDENT IN AN ORGANIZED HEALTH CARE EDUCATION/TRAINING PROGRAM

## 2020-11-06 PROCEDURE — 84484 ASSAY OF TROPONIN QUANT: CPT

## 2020-11-06 PROCEDURE — 83615 LACTATE (LD) (LDH) ENZYME: CPT

## 2020-11-06 PROCEDURE — 85379 FIBRIN DEGRADATION QUANT: CPT

## 2020-11-06 PROCEDURE — 71045 X-RAY EXAM CHEST 1 VIEW: CPT

## 2020-11-06 PROCEDURE — 99282 EMERGENCY DEPT VISIT SF MDM: CPT

## 2020-11-06 PROCEDURE — 82728 ASSAY OF FERRITIN: CPT

## 2020-11-06 PROCEDURE — 6370000000 HC RX 637 (ALT 250 FOR IP): Performed by: EMERGENCY MEDICINE

## 2020-11-06 PROCEDURE — 2060000000 HC ICU INTERMEDIATE R&B

## 2020-11-06 PROCEDURE — 81001 URINALYSIS AUTO W/SCOPE: CPT

## 2020-11-06 PROCEDURE — 6360000002 HC RX W HCPCS: Performed by: STUDENT IN AN ORGANIZED HEALTH CARE EDUCATION/TRAINING PROGRAM

## 2020-11-06 PROCEDURE — 6370000000 HC RX 637 (ALT 250 FOR IP): Performed by: STUDENT IN AN ORGANIZED HEALTH CARE EDUCATION/TRAINING PROGRAM

## 2020-11-06 PROCEDURE — 85384 FIBRINOGEN ACTIVITY: CPT

## 2020-11-06 PROCEDURE — 87081 CULTURE SCREEN ONLY: CPT

## 2020-11-06 PROCEDURE — 93010 ELECTROCARDIOGRAM REPORT: CPT | Performed by: INTERNAL MEDICINE

## 2020-11-06 PROCEDURE — 80053 COMPREHEN METABOLIC PANEL: CPT

## 2020-11-06 PROCEDURE — 87880 STREP A ASSAY W/OPTIC: CPT

## 2020-11-06 PROCEDURE — 83605 ASSAY OF LACTIC ACID: CPT

## 2020-11-06 PROCEDURE — 93005 ELECTROCARDIOGRAM TRACING: CPT | Performed by: EMERGENCY MEDICINE

## 2020-11-06 PROCEDURE — 85610 PROTHROMBIN TIME: CPT

## 2020-11-06 RX ORDER — GUAIFENESIN/DEXTROMETHORPHAN 100-10MG/5
5 SYRUP ORAL EVERY 4 HOURS PRN
Status: DISCONTINUED | OUTPATIENT
Start: 2020-11-06 | End: 2020-11-11 | Stop reason: HOSPADM

## 2020-11-06 RX ORDER — POLYETHYLENE GLYCOL 3350 17 G/17G
17 POWDER, FOR SOLUTION ORAL DAILY PRN
Status: DISCONTINUED | OUTPATIENT
Start: 2020-11-06 | End: 2020-11-11 | Stop reason: HOSPADM

## 2020-11-06 RX ORDER — HEPARIN SODIUM 5000 [USP'U]/ML
5000 INJECTION, SOLUTION INTRAVENOUS; SUBCUTANEOUS EVERY 8 HOURS SCHEDULED
Status: DISCONTINUED | OUTPATIENT
Start: 2020-11-06 | End: 2020-11-06

## 2020-11-06 RX ORDER — INSULIN LISPRO 100 [IU]/ML
0-12 INJECTION, SOLUTION INTRAVENOUS; SUBCUTANEOUS
Status: DISCONTINUED | OUTPATIENT
Start: 2020-11-06 | End: 2020-11-07

## 2020-11-06 RX ORDER — ACETAMINOPHEN 650 MG/1
650 SUPPOSITORY RECTAL EVERY 6 HOURS PRN
Status: DISCONTINUED | OUTPATIENT
Start: 2020-11-06 | End: 2020-11-11 | Stop reason: HOSPADM

## 2020-11-06 RX ORDER — FLUTICASONE PROPIONATE 50 MCG
1 SPRAY, SUSPENSION (ML) NASAL DAILY PRN
COMMUNITY

## 2020-11-06 RX ORDER — ATORVASTATIN CALCIUM 40 MG/1
40 TABLET, FILM COATED ORAL DAILY
Status: DISCONTINUED | OUTPATIENT
Start: 2020-11-07 | End: 2020-11-11 | Stop reason: HOSPADM

## 2020-11-06 RX ORDER — ACETAMINOPHEN 325 MG/1
650 TABLET ORAL EVERY 6 HOURS PRN
Status: DISCONTINUED | OUTPATIENT
Start: 2020-11-06 | End: 2020-11-11 | Stop reason: HOSPADM

## 2020-11-06 RX ORDER — DEXTROSE MONOHYDRATE 25 G/50ML
12.5 INJECTION, SOLUTION INTRAVENOUS PRN
Status: DISCONTINUED | OUTPATIENT
Start: 2020-11-06 | End: 2020-11-11 | Stop reason: HOSPADM

## 2020-11-06 RX ORDER — ACETAMINOPHEN 500 MG
1000 TABLET ORAL ONCE
Status: COMPLETED | OUTPATIENT
Start: 2020-11-06 | End: 2020-11-06

## 2020-11-06 RX ORDER — ATENOLOL 25 MG/1
50 TABLET ORAL DAILY
Status: DISCONTINUED | OUTPATIENT
Start: 2020-11-07 | End: 2020-11-11

## 2020-11-06 RX ORDER — SODIUM CHLORIDE 0.9 % (FLUSH) 0.9 %
10 SYRINGE (ML) INJECTION EVERY 12 HOURS SCHEDULED
Status: DISCONTINUED | OUTPATIENT
Start: 2020-11-06 | End: 2020-11-11 | Stop reason: HOSPADM

## 2020-11-06 RX ORDER — FEXOFENADINE HCL 180 MG/1
180 TABLET ORAL DAILY PRN
Status: ON HOLD | COMMUNITY
End: 2020-11-11 | Stop reason: HOSPADM

## 2020-11-06 RX ORDER — ONDANSETRON 2 MG/ML
4 INJECTION INTRAMUSCULAR; INTRAVENOUS EVERY 6 HOURS PRN
Status: DISCONTINUED | OUTPATIENT
Start: 2020-11-06 | End: 2020-11-11 | Stop reason: HOSPADM

## 2020-11-06 RX ORDER — PROMETHAZINE HYDROCHLORIDE 25 MG/1
12.5 TABLET ORAL EVERY 6 HOURS PRN
Status: DISCONTINUED | OUTPATIENT
Start: 2020-11-06 | End: 2020-11-11 | Stop reason: HOSPADM

## 2020-11-06 RX ORDER — INSULIN LISPRO 100 [IU]/ML
0-6 INJECTION, SOLUTION INTRAVENOUS; SUBCUTANEOUS NIGHTLY
Status: DISCONTINUED | OUTPATIENT
Start: 2020-11-06 | End: 2020-11-07

## 2020-11-06 RX ORDER — NICOTINE POLACRILEX 4 MG
15 LOZENGE BUCCAL PRN
Status: DISCONTINUED | OUTPATIENT
Start: 2020-11-06 | End: 2020-11-11 | Stop reason: HOSPADM

## 2020-11-06 RX ORDER — DEXTROSE MONOHYDRATE 50 MG/ML
100 INJECTION, SOLUTION INTRAVENOUS PRN
Status: DISCONTINUED | OUTPATIENT
Start: 2020-11-06 | End: 2020-11-11 | Stop reason: HOSPADM

## 2020-11-06 RX ORDER — SODIUM CHLORIDE 9 MG/ML
INJECTION, SOLUTION INTRAVENOUS CONTINUOUS
Status: DISCONTINUED | OUTPATIENT
Start: 2020-11-06 | End: 2020-11-07

## 2020-11-06 RX ORDER — LABETALOL 20 MG/4 ML (5 MG/ML) INTRAVENOUS SYRINGE
10 EVERY 6 HOURS PRN
Status: DISCONTINUED | OUTPATIENT
Start: 2020-11-06 | End: 2020-11-11 | Stop reason: HOSPADM

## 2020-11-06 RX ORDER — SODIUM CHLORIDE, SODIUM LACTATE, POTASSIUM CHLORIDE, AND CALCIUM CHLORIDE .6; .31; .03; .02 G/100ML; G/100ML; G/100ML; G/100ML
2000 INJECTION, SOLUTION INTRAVENOUS ONCE
Status: DISCONTINUED | OUTPATIENT
Start: 2020-11-06 | End: 2020-11-11 | Stop reason: HOSPADM

## 2020-11-06 RX ORDER — SODIUM CHLORIDE 0.9 % (FLUSH) 0.9 %
10 SYRINGE (ML) INJECTION PRN
Status: DISCONTINUED | OUTPATIENT
Start: 2020-11-06 | End: 2020-11-11 | Stop reason: HOSPADM

## 2020-11-06 RX ORDER — AMLODIPINE BESYLATE 10 MG/1
10 TABLET ORAL DAILY
Status: DISCONTINUED | OUTPATIENT
Start: 2020-11-07 | End: 2020-11-11 | Stop reason: HOSPADM

## 2020-11-06 RX ADMIN — INSULIN GLARGINE 10 UNITS: 100 INJECTION, SOLUTION SUBCUTANEOUS at 21:36

## 2020-11-06 RX ADMIN — ACETAMINOPHEN 650 MG: 325 TABLET ORAL at 05:00

## 2020-11-06 RX ADMIN — INSULIN LISPRO 4 UNITS: 100 INJECTION, SOLUTION INTRAVENOUS; SUBCUTANEOUS at 21:23

## 2020-11-06 RX ADMIN — ACETAMINOPHEN 650 MG: 325 TABLET ORAL at 18:15

## 2020-11-06 RX ADMIN — CEFTRIAXONE 1 G: 1 INJECTION, POWDER, FOR SOLUTION INTRAMUSCULAR; INTRAVENOUS at 09:51

## 2020-11-06 RX ADMIN — ENOXAPARIN SODIUM 40 MG: 40 INJECTION SUBCUTANEOUS at 21:10

## 2020-11-06 RX ADMIN — ACETAMINOPHEN 1000 MG: 500 TABLET ORAL at 11:31

## 2020-11-06 RX ADMIN — DEXTROSE MONOHYDRATE 500 MG: 50 INJECTION, SOLUTION INTRAVENOUS at 10:58

## 2020-11-06 RX ADMIN — SODIUM CHLORIDE: 9 INJECTION, SOLUTION INTRAVENOUS at 14:00

## 2020-11-06 RX ADMIN — INSULIN LISPRO 6 UNITS: 100 INJECTION, SOLUTION INTRAVENOUS; SUBCUTANEOUS at 18:17

## 2020-11-06 ASSESSMENT — PAIN SCALES - GENERAL
PAINLEVEL_OUTOF10: 0
PAINLEVEL_OUTOF10: 5
PAINLEVEL_OUTOF10: 5
PAINLEVEL_OUTOF10: 0
PAINLEVEL_OUTOF10: 5
PAINLEVEL_OUTOF10: 0
PAINLEVEL_OUTOF10: 4

## 2020-11-06 ASSESSMENT — PAIN DESCRIPTION - LOCATION: LOCATION: FLANK

## 2020-11-06 ASSESSMENT — PAIN DESCRIPTION - DESCRIPTORS: DESCRIPTORS: SHARP

## 2020-11-06 ASSESSMENT — PAIN DESCRIPTION - ORIENTATION
ORIENTATION: RIGHT
ORIENTATION: LEFT

## 2020-11-06 NOTE — ED NOTES
Report given to RUTH Pabon. Test result for COVID-19 from [id number] faxed and to be scanned into pt's chart. Reviewed with RUTH Pabon.      Sobeida Cazares RN  11/06/20 [id number]

## 2020-11-06 NOTE — H&P
Internal Medicine PGY-1 Resident History & Physical      PCP: Christiana Sagastume MD    Date of Admission: 11/6/2020    Date of Service: Pt seen/examined on 11/6/2020 and Admitted to Inpatient with expected LOS greaterthan two midnights due to medical therapy. Chief Complaint:  Dizziness, pharyngitis, body aches    History Of Present Illness:     49 y.o. female with PMH of type 2 DM, HTN, HLD, & ZA p/w productive cough with clear phlegm, body aches and dizziness since last friday. Pt reports decreased appetite and fevers up to 101.1F 2 days ago as well as another one this morning. The body aches started this week and are described as being all over. Two days ago she went to a clinic to get tested for covid and the flu. Flu came back negative, and apparently the covid just got back and is positive. She was told to go to the ED if her symptoms got worse. She states that she tried to stay home but today the symptoms became unbearable so she wanted to get checked out. She believes her daugthers ex-boyfriend had covid and she was exposed to him last Friday. She endorses decreased appetite, chills, nausea, diarrhea (1 episode today), chest pressure and changes in her taste. She denies SOB, vomiting, abdominal pain. She presented to [facility] ED afebrile and hemodynamically stable, RR 21, satting 95% on room air. EKG revealed NSR and was similar to prior in 2017. CXR revealed faint airspace opacity of the R lung base. Labs were significant for absolute lymphocytes of 0.9, Na 128, Cl 93, glucose 272, Cr 1.9 (baseline Cr 0.7-0.9). UA dirty with 11-20 ep cells, +4 bacteria. She will be transferred to [facility] for further evaluation and treatment of her covid vs CAP pneumonia and MADDIE.     Past Medical History:          Diagnosis Date    Allergic rhinitis     Alopecia     Diabetes mellitus type 2, uncontrolled, with complications ([facility] 03/48/9284    TYPE 2    Essential hypertension     Fibroids, intramural 6/8/2011  History of uterine fibroid     Hyperlipidemia     [facility]struct[facility]) 2011    [facility]     Vitamin D deficiency 2012       Past Surgical History:          Procedure Laterality Date     SECTION      x3    COLONOSCOPY  2017    fu 5 years    TUBAL LIGATION      BTL       Medications Prior to Admission:      Prior to Admission medications    Medication Sig Start Date End Date Taking?  Authorizing Provider   spironolactone (ALDACTONE) 25 MG tablet Take 1 tablet by mouth daily 20   Anca Hoyt MD   glimepiride (AMARYL) 4 MG tablet TAKE ONE AND ONE-HALF TABLET BY MOUTH EVERY MORNING BEFORE BREAKFAST 20   Marlene Shepherd MD   Insulin Pen Needle 29G X 12.7MM MISC 1 each by Does not apply route daily 8/3/20   Marlene Shepherd MD   insulin glargine (LANTUS SOLOSTAR) 100 UNIT/ML injection pen Inject 10 Units into the skin nightly 20   Marlene Shepherd MD   valsartan-hydrochlorothiazide (DIOVAN-HCT) 320-25 MG per tablet TAKE ONE TABLET BY MOUTH DAILY 20   Marlene Shepherd MD   atorvastatin (LIPITOR) 40 MG tablet Take 1 tablet by mouth daily Replaces Simvastatin (due to drug interaction with Amlodipine) 20   Marlene Shepherd MD   metFORMIN (GLUCOPHAGE-XR) 500 MG extended release tablet TAKE FOUR TABLETS BY MOUTH DAILY WITH BREAKFAST 20   Marlene Shepherd MD   amLODIPine (NORVASC) 10 MG tablet TAKE ONE TABLET BY MOUTH DAILY 20   Marlene Shepherd MD   fluticasone (FLONASE) 50 MCG/ACT nasal spray 1 spray by Nasal route daily 20   Marlene Shepherd MD   OneTouch Delica Lancets 67X MISC Test 3 times daily E11.9 20   Marlene Shepherd MD   SITagliptin (JANUVIA) 100 MG tablet Take 1 tablet by mouth daily 3/9/20   Marlene Shepherd MD   atenolol (TENORMIN) 50 MG tablet Take 1 tablet by mouth daily 20   Marlene Shepherd MD Cholecalciferol (VITAMIN D3) 1000 units TABS Take 2 tablets by mouth daily 9/9/19   Gay Crump MD   fexofenadine TY [facility] ALLERGY) 180 MG tablet Take 1 tablet by mouth daily Start after completion of Allegra-D 9/9/19   Gay Crump MD   ONE TOUCH ULTRA TEST strip USE TO TEST THREE TIMES A DAY 8/5/19   Gay Crump MD   Blood Glucose Monitoring Suppl [facility] BLOOD GLUCOSE METER) w/Device KIT Inject 1 Applicatorful into the skin 2 times daily (before meals) 10/5/18   Ofelia Cullen, DO   Multiple Vitamins-Minerals (THERAPEUTIC MULTIVITAMIN-MINERALS) tablet Take 1 tablet by mouth daily    Historical Provider, MD       Allergies:  Prednisone    Social History:      The patient currently lives with her . TOBACCO:   reports that she has never smoked. She has never used smokeless tobacco.  ETOH:  reports current alcohol use of about 2.0 standard drinks of alcohol per week.       Family History:         Problem Relation Age of Onset    Diabetes Mother         DM 2    Heart Attack Mother     Heart Disease Mother     High Blood Pressure Mother     Cancer Father         Lung    Stroke Father 48        smoker    No Known Problems Brother     Hypertension Sister     Hypertension Brother     No Known Problems Maternal Aunt     No Known Problems Maternal Uncle     No Known Problems Paternal Aunt     No Known Problems Paternal Uncle     No Known Problems Maternal Grandmother     No Known Problems Maternal Grandfather     No Known Problems Paternal Grandmother     No Known Problems Paternal Grandfather     No Known Problems Other     Breast Cancer Maternal Cousin     Rheum Arthritis Neg Hx     Osteoarthritis Neg Hx     Asthma Neg Hx     Heart Failure Neg Hx     High Cholesterol Neg Hx     Migraines Neg Hx     Ovarian Cancer Neg Hx     Rashes/Skin Problems Neg Hx     Seizures Neg Hx     Thyroid Disease Neg Hx        REVIEW OF SYSTEMS: Pertinent positives as noted in the HPI. All other systems reviewed and negative. ROS: Review of Systems    PHYSICAL EXAM PERFORMED:    /70   Pulse 88   Temp 100.8 °F (38.2 °C) (Oral)   Resp (!) 32   Wt 280 lb (127 kg) Comment: brought back in wheelchair  SpO2 94%   BMI 51.21 kg/m²     General appearance:  No apparent distress, appears stated age and cooperative. HEENT:  Normal cephalic,atraumatic without obvious deformity. Pupils equal, round, and reactive to light. Extra ocular muscles intact. Conjunctivae/corneas clear. Neck: Supple, with full range of motion. No jugular venous distention. Trachea midline. Respiratory:  Normal respiratory effort. Clear to auscultation, bilaterally without Rales/Wheezes/Rhonchi. Cardiovascular:  Regular rate and rhythm with normal S1/S2 without murmurs, rubs or gallops. Abdomen: Soft, obese, non-tender, non-distended with normal bowel sounds. Musculoskeletal:  No clubbing, cyanosis. +1 pitting edema bilaterally. Full range of motion without deformity. Skin: Skin color, texture, turgor normal.  Norashes or lesions. Neurologic:  Neurovascularly intact without any focal sensory/motor deficits. Cranialnerves: II-XII intact, grossly non-focal.  Psychiatric:  Alert and oriented, thought content appropriate,normal insight  Capillary Refill: Brisk,< 3 seconds   Peripheral Pulses: +2 palpable, equal bilaterally       Labs:     Recent Labs     11/06/20  0752   WBC 5.0   HGB 11.7*   HCT 34.8*        Recent Labs     11/06/20  0752   *   K 4.1   CL 93*   CO2 22   BUN 43*   CREATININE 1.9*   CALCIUM 9.1     Recent Labs     11/06/20  0752   AST 30   ALT 26   BILITOT 0.4   ALKPHOS 68     No results for input(s): INR in the last 72 hours.   Recent Labs     11/06/20  0752   TROPONINI <0.01       Urinalysis:      Lab Results   Component Value Date    NITRU Negative 11/06/2020    WBCUA 3-5 11/06/2020    BACTERIA 4+ 11/06/2020    RBCUA 0-2 11/06/2020    BLOODU MODERATE 11/06/2020

## 2020-11-06 NOTE — ED TRIAGE NOTES
Faxed COVID-19 test results from [address] to pt's floor she was admitted, phoned and spoke with [facility] regarding, fax received.

## 2020-11-06 NOTE — ED PROVIDER NOTES
Emergency Physician Note    Chief Complaint  Dizziness (Pt c/o s/s x 4 days. [address] tested for COVID-19 and Flu results pending.); Pharyngitis; and Generalized Body Aches       History of Present Illness  Keira Cavazos is a 49 y.o. female who presents to the ED for sore throat and body aches. Patient complains of sore throat and body aches for the last 4 days. She has had very little appetite and has not been eating or drinking very much. She has had fevers at home and urgent care. She was evaluated at urgent care and had Covid test which still pending and flu test which was negative. She denies any direct exposure to someone with COVID-19 although her daughter's boyfriend had it. She denies any chest pain. She does feel short of breath. She denies any vomiting but has been nauseated. 10 systems reviewed, pertinent positives per HPI otherwise noted to be negative    I have reviewed the following from the nursing documentation:      Prior to Admission medications    Medication Sig Start Date End Date Taking?  Authorizing Provider   spironolactone (ALDACTONE) 25 MG tablet Take 1 tablet by mouth daily 8/27/20   Rusty Montes MD   glimepiride (AMARYL) 4 MG tablet TAKE ONE AND ONE-HALF TABLET BY MOUTH EVERY MORNING BEFORE BREAKFAST 8/6/20   Jace Lares MD   Insulin Pen Needle 29G X 12.7MM MISC 1 each by Does not apply route daily 8/3/20   Jace Lares MD   insulin glargine (LANTUS SOLOSTAR) 100 UNIT/ML injection pen Inject 10 Units into the skin nightly 7/22/20   Jace Lares MD   valsartan-hydrochlorothiazide (DIOVAN-HCT) 320-25 MG per tablet TAKE ONE TABLET BY MOUTH DAILY 7/20/20   Jace Lares MD   atorvastatin (LIPITOR) 40 MG tablet Take 1 tablet by mouth daily Replaces Simvastatin (due to drug interaction with Amlodipine) 7/20/20   Jace Lares MD   metFORMIN (GLUCOPHAGE-XR) 500 MG extended release tablet TAKE FOUR TABLETS BY MOUTH DAILY WITH BREAKFAST 20   Kenya Erickson MD   amLODIPine (NORVASC) 10 MG tablet TAKE ONE TABLET BY MOUTH DAILY 20   Kenya Erickson MD   fluticasone [facility]) 50 MCG/ACT nasal spray 1 spray by Nasal route daily 20   Kenya Erickson MD   BiancaTouch Delica Lancets 51Z MISC Test 3 times daily E11.9 20   Kenya Erickson MD   SITagliptin (JANUVIA) 100 MG tablet Take 1 tablet by mouth daily 3/9/20   Kenya Erickson MD   atenolol (TENORMIN) 50 MG tablet Take 1 tablet by mouth daily 20   Kenya Erickson MD   Cholecalciferol (VITAMIN D3) 1000 units TABS Take 2 tablets by mouth daily 19   Kenya Erickson MD   fexofenadine TY [facility] ALLERGY) 180 MG tablet Take 1 tablet by mouth daily Start after completion of Allegra-D 19   Kenya Erickson MD   ONE TOUCH ULTRA TEST strip USE TO TEST THREE TIMES A DAY 19   Kenya Erickson MD   Blood Glucose Monitoring Suppl [facility] BLOOD GLUCOSE METER) w/Device KIT Inject 1 Applicatorful into the skin 2 times daily (before meals) 10/5/18   Robert Mixon, DO   Multiple Vitamins-Minerals (THERAPEUTIC MULTIVITAMIN-MINERALS) tablet Take 1 tablet by mouth daily    Historical Provider, MD       Allergies as of 2020 - Review Complete 2020   Allergen Reaction Noted    Prednisone Other (See Comments) 2012       Past Medical History:   Diagnosis Date    Allergic rhinitis     Alopecia     Diabetes mellitus type 2, uncontrolled, with complications ([facility] [address]     TYPE 2    Essential hypertension     Fibroids, intramural 2011    History of uterine fibroid     Hyperlipidemia     ZA (obstructive sleep apnea) 2011    [facility] sleep eval     Vitamin D deficiency 2012        Surgical History:   Past Surgical History:   Procedure Laterality Date     SECTION      x3    COLONOSCOPY      fu 5 years    TUBAL LIGATION 11/90    BTL        Family History:    Family History   Problem Relation Age of Onset    Diabetes Mother         DM 2    Heart Attack Mother     Heart Disease Mother     High Blood Pressure Mother     Cancer Father         Lung    Stroke Father 48        smoker    No Known Problems Brother     Hypertension Sister     Hypertension Brother     No Known Problems Maternal Aunt     No Known Problems Maternal Uncle     No Known Problems Paternal Aunt     No Known Problems Paternal Uncle     No Known Problems Maternal Grandmother     No Known Problems Maternal Grandfather     No Known Problems Paternal Grandmother     No Known Problems Paternal Grandfather     No Known Problems Other     Breast Cancer Maternal Cousin     Rheum Arthritis Neg Hx     Osteoarthritis Neg Hx     Asthma Neg Hx     Heart Failure Neg Hx     High Cholesterol Neg Hx     Migraines Neg Hx     Ovarian Cancer Neg Hx     Rashes/Skin Problems Neg Hx     Seizures Neg Hx     Thyroid Disease Neg Hx        Social History     Socioeconomic History    Marital status:      Spouse name: Fabienne James Number of children: 3    Years of education: Not on file    Highest education level: Not on file   Occupational History    Occupation:      Comment: fulltime. [employer] Working cooperatively   Social Needs    Financial resource strain: Not on file    Food insecurity     Worry: Not on file     Inability: Not on file   [employer] needs     Medical: Not on file     Non-medical: Not on file   Tobacco Use    Smoking status: Never Smoker    Smokeless tobacco: Never Used    Tobacco comment: passive smoke exposure    Substance and Sexual Activity    Alcohol use:  Yes     Alcohol/week: 2.0 standard drinks     Types: 2 Glasses of wine per week     Frequency: 2-4 times a month     Drinks per session: 1 or 2     Binge frequency: Never     Comment: occasionally     Drug use: No    Sexual activity: Not Currently     Partners: Male   Lifestyle    Physical activity     Days per week: Not on file     Minutes per session: Not on file    Stress: Not on file   Relationships    Social connections     Talks on phone: Not on file     Gets together: Not on file     Attends Jainism service: Not on file     Active member of club or organization: Not on file     Attends meetings of clubs or organizations: Not on file     Relationship status: Not on file    Intimate partner violence     Fear of current or ex partner: Not on file     Emotionally abused: Not on file     Physically abused: Not on file     Forced sexual activity: Not on file   Other Topics Concern    Not on file   Social History Narrative    3 children, 12 grandchildren       Nursing notes reviewed. ED Triage Vitals [11/06/20 0744]   Enc Vitals Group      /75      Pulse 84      Resp 21      Temp 99.8 °F (37.7 °C)      Temp Source Oral      SpO2 95 %      Weight 280 lb (127 kg)      Height       Head Circumference       Peak Flow       Pain Score       Pain Loc       Pain Edu? Excl. in HOSP [facility]? GENERAL:  Awake, alert. Well developed, obese with no apparent distress. HENT:  Normocephalic, Atraumatic, moist mucous membranes. Posterior oropharynx erythematous without edema or exudate  EYES:  Pupils equal round and reactive to light, Conjunctiva normal, extraocular movements normal.  NECK:  No meningeal signs, Supple. CHEST:  Regular rate and rhythm, chest wall non-tender. LUNGS: Scattered rhonchi with occasional rales bilaterally. ABDOMEN:  Soft, non-tender, no rebound, rigidity or guarding, non-distended, normal bowel sounds. No costovertebral angle tenderness to palpation. BACK:  No tenderness. EXTREMITIES:  Normal range of motion, no edema, no bony tenderness, no deformity, distal pulses present. SKIN: Warm, dry and intact. NEUROLOGIC: Normal mental status. Moving all extremities to command.        LABS and DIAGNOSTIC RESULTS  EKG  The Ekg interpreted by me shows  normal sinus rhythm with a rate of 82  Axis is   Normal  QTc is  normal  Intervals and Durations are unremarkable. ST Segments:normal  Delta waves, Brugada Syndrome, and Short ME are not present. No significant change from prior EKG dated 6/8/17    RADIOLOGY  X-RAYS:  I have reviewed radiologic plain film image(s). ALL OTHER NON-PLAIN FILM IMAGES SUCH AS CT, ULTRASOUND AND MRI HAVE BEEN READ BY THE RADIOLOGIST. XR CHEST 1 VIEW   Final Result   Impression: Faint airspace opacity of the right lung base medially.            LABS  Labs Reviewed   CBC WITH AUTO DIFFERENTIAL - Abnormal; Notable for the following components:       Result Value    Hemoglobin 11.7 (*)     Hematocrit 34.8 (*)     Lymphocytes Absolute 0.9 (*)     All other components within normal limits    Narrative:     Performed at:  [place] [address]   Phone [phone]   COMPREHENSIVE METABOLIC PANEL W/ REFLEX TO MG FOR LOW K - Abnormal; Notable for the following components:    Sodium 128 (*)     Chloride 93 (*)     Glucose 272 (*)     BUN 43 (*)     CREATININE 1.9 (*)     GFR Non- 28 (*)     GFR  33 (*)     Total Protein 8.6 (*)     Albumin/Globulin Ratio 0.9 (*)     All other components within normal limits    Narrative:     Performed at:  [place] [address]   Phone [phone]   URINE RT REFLEX TO CULTURE - Abnormal; Notable for the following components:    Clarity, UA SL CLOUDY (*)     Blood, Urine MODERATE (*)     Protein,  (*)     All other components within normal limits    Narrative:     Performed at:  [place] [address]   Phone [phone]   MICROSCOPIC URINALYSIS - Abnormal; Notable for the following components:    Fine Casts, UA 6-10 (*)     Epithelial Cells, UA 11-20 (*)     Bacteria, UA 4+ (*)     All other components within normal limits    Narrative:     Performed at:  [facility]  [facility] [place],  [place] [facility] [place]   Phone [phone] A THROAT    Narrative:     Performed at:  [place] [address],  [place] [address]   Phone [phone]   CULTURE, BETA STREP CONFIRM PLATES   TROPONIN    Narrative:     Performed at:  [place] [address],  [place] [address]   Phone [phone]   LACTIC ACID, PLASMA    Narrative:     Performed at:  [facility] [place],  [place] [facility] [place]   Phone [phone]     [place] time is 40 minutes which excludes separately billable procedures. The critical care was concerning IV fluid bolus for renal failure. This time is exclusive of any time documented by any other providers. I spoke with Dr. Nusrat Gomez. We thoroughly discussed the history, physical exam, laboratory and imaging studies, as well as, emergency department course. Based upon that discussion, we've decided to admit Laura Kramer for further observation and evaluation of Sofía Ragland's dyspnea. As I have deemed necessary from their history, physical, and studies, I have considered and evaluated Laura Kramer for the following diagnoses:  ACUTE CORONARY SYNDROME, CHRONIC OBSTRUCTIVE PULMONARY DISEASE, CONGESTIVE HEART FAILURE, PERICARDIAL TAMPONADE, PNEUMONIA, PNEUMOTHORAX, PULMONARY EMBOLISM, SEPSIS, and THORACIC DISSECTION. FINAL IMPRESSION  1. Acute renal failure, unspecified acute renal failure type ([facility] [address]    2. Community acquired pneumonia of right lower lobe of lung        Vitals:  Blood pressure 118/75, pulse 84, temperature 99.8 °F (37.7 °C), temperature source Oral, resp.  rate 21, weight 280 lb (127 kg), SpO2 95 %, not currently breastfeeding. Disposition  Pt is in stable condition upon Admit to telemetry. This chart was generated using the [address] [place] [address] [address]  dictation system. I created this record but it may contain dictation errors.           Philipp Plasencia MD  11/06/20 9534

## 2020-11-07 LAB
ALBUMIN SERPL-MCNC: 3.7 G/DL (ref 3.4–5)
ALP BLD-CCNC: 54 U/L (ref 40–129)
ALT SERPL-CCNC: 30 U/L (ref 10–40)
ANION GAP SERPL CALCULATED.3IONS-SCNC: 11 MMOL/L (ref 3–16)
AST SERPL-CCNC: 37 U/L (ref 15–37)
BASOPHILS ABSOLUTE: 0 K/UL (ref 0–0.2)
BASOPHILS RELATIVE PERCENT: 0.4 %
BILIRUB SERPL-MCNC: 0.3 MG/DL (ref 0–1)
BILIRUBIN DIRECT: <0.2 MG/DL (ref 0–0.3)
BILIRUBIN, INDIRECT: NORMAL MG/DL (ref 0–1)
BUN BLDV-MCNC: 26 MG/DL (ref 7–20)
CALCIUM SERPL-MCNC: 8.7 MG/DL (ref 8.3–10.6)
CHLORIDE BLD-SCNC: 97 MMOL/L (ref 99–110)
CO2: 22 MMOL/L (ref 21–32)
CREAT SERPL-MCNC: 1.1 MG/DL (ref 0.6–1.1)
D DIMER: 326 NG/ML DDU (ref 0–229)
EOSINOPHILS ABSOLUTE: 0 K/UL (ref 0–0.6)
EOSINOPHILS RELATIVE PERCENT: 0 %
FIBRINOGEN: 521 MG/DL (ref 200–397)
GFR AFRICAN AMERICAN: >60
GFR NON-AFRICAN AMERICAN: 52
GLUCOSE BLD-MCNC: 227 MG/DL (ref 70–99)
GLUCOSE BLD-MCNC: 249 MG/DL (ref 70–99)
GLUCOSE BLD-MCNC: 284 MG/DL (ref 70–99)
GLUCOSE BLD-MCNC: 291 MG/DL (ref 70–99)
GLUCOSE BLD-MCNC: 432 MG/DL (ref 70–99)
HCT VFR BLD CALC: 32.2 % (ref 36–48)
HEMOGLOBIN: 10.8 G/DL (ref 12–16)
INR BLD: 1.17 (ref 0.86–1.14)
LYMPHOCYTES ABSOLUTE: 1.1 K/UL (ref 1–5.1)
LYMPHOCYTES RELATIVE PERCENT: 28.9 %
MAGNESIUM: 2.2 MG/DL (ref 1.8–2.4)
MCH RBC QN AUTO: 27.1 PG (ref 26–34)
MCHC RBC AUTO-ENTMCNC: 33.5 G/DL (ref 31–36)
MCV RBC AUTO: 80.8 FL (ref 80–100)
MONOCYTES ABSOLUTE: 0.3 K/UL (ref 0–1.3)
MONOCYTES RELATIVE PERCENT: 8.1 %
NEUTROPHILS ABSOLUTE: 2.5 K/UL (ref 1.7–7.7)
NEUTROPHILS RELATIVE PERCENT: 62.6 %
PDW BLD-RTO: 14.8 % (ref 12.4–15.4)
PERFORMED ON: ABNORMAL
PHOSPHORUS: 2.5 MG/DL (ref 2.5–4.9)
PLATELET # BLD: 168 K/UL (ref 135–450)
PMV BLD AUTO: 9.2 FL (ref 5–10.5)
POTASSIUM SERPL-SCNC: 4.2 MMOL/L (ref 3.5–5.1)
PROTHROMBIN TIME: 13.6 SEC (ref 10–13.2)
RBC # BLD: 3.99 M/UL (ref 4–5.2)
SODIUM BLD-SCNC: 130 MMOL/L (ref 136–145)
TOTAL PROTEIN: 7.4 G/DL (ref 6.4–8.2)
WBC # BLD: 3.9 K/UL (ref 4–11)

## 2020-11-07 PROCEDURE — 2060000000 HC ICU INTERMEDIATE R&B

## 2020-11-07 PROCEDURE — 85384 FIBRINOGEN ACTIVITY: CPT

## 2020-11-07 PROCEDURE — 6370000000 HC RX 637 (ALT 250 FOR IP): Performed by: STUDENT IN AN ORGANIZED HEALTH CARE EDUCATION/TRAINING PROGRAM

## 2020-11-07 PROCEDURE — 6360000002 HC RX W HCPCS: Performed by: STUDENT IN AN ORGANIZED HEALTH CARE EDUCATION/TRAINING PROGRAM

## 2020-11-07 PROCEDURE — 83735 ASSAY OF MAGNESIUM: CPT

## 2020-11-07 PROCEDURE — 83036 HEMOGLOBIN GLYCOSYLATED A1C: CPT

## 2020-11-07 PROCEDURE — 36415 COLL VENOUS BLD VENIPUNCTURE: CPT

## 2020-11-07 PROCEDURE — 80076 HEPATIC FUNCTION PANEL: CPT

## 2020-11-07 PROCEDURE — 80069 RENAL FUNCTION PANEL: CPT

## 2020-11-07 PROCEDURE — 85610 PROTHROMBIN TIME: CPT

## 2020-11-07 PROCEDURE — 85379 FIBRIN DEGRADATION QUANT: CPT

## 2020-11-07 PROCEDURE — 2580000003 HC RX 258: Performed by: STUDENT IN AN ORGANIZED HEALTH CARE EDUCATION/TRAINING PROGRAM

## 2020-11-07 PROCEDURE — 85025 COMPLETE CBC W/AUTO DIFF WBC: CPT

## 2020-11-07 RX ORDER — SIMETHICONE 80 MG
80 TABLET,CHEWABLE ORAL EVERY 6 HOURS PRN
Status: DISCONTINUED | OUTPATIENT
Start: 2020-11-07 | End: 2020-11-11 | Stop reason: HOSPADM

## 2020-11-07 RX ORDER — DEXAMETHASONE 4 MG/1
6 TABLET ORAL DAILY
Status: DISCONTINUED | OUTPATIENT
Start: 2020-11-07 | End: 2020-11-11 | Stop reason: HOSPADM

## 2020-11-07 RX ORDER — INSULIN LISPRO 100 [IU]/ML
0-9 INJECTION, SOLUTION INTRAVENOUS; SUBCUTANEOUS NIGHTLY
Status: DISCONTINUED | OUTPATIENT
Start: 2020-11-07 | End: 2020-11-11 | Stop reason: HOSPADM

## 2020-11-07 RX ORDER — INSULIN LISPRO 100 [IU]/ML
0-18 INJECTION, SOLUTION INTRAVENOUS; SUBCUTANEOUS
Status: DISCONTINUED | OUTPATIENT
Start: 2020-11-07 | End: 2020-11-11 | Stop reason: HOSPADM

## 2020-11-07 RX ADMIN — INSULIN LISPRO 6 UNITS: 100 INJECTION, SOLUTION INTRAVENOUS; SUBCUTANEOUS at 12:04

## 2020-11-07 RX ADMIN — ENOXAPARIN SODIUM 40 MG: 40 INJECTION SUBCUTANEOUS at 09:24

## 2020-11-07 RX ADMIN — DEXAMETHASONE 6 MG: 4 TABLET ORAL at 12:05

## 2020-11-07 RX ADMIN — SIMETHICONE 80 MG: 80 TABLET, CHEWABLE ORAL at 12:05

## 2020-11-07 RX ADMIN — INSULIN LISPRO 4 UNITS: 100 INJECTION, SOLUTION INTRAVENOUS; SUBCUTANEOUS at 09:30

## 2020-11-07 RX ADMIN — ENOXAPARIN SODIUM 40 MG: 40 INJECTION SUBCUTANEOUS at 22:06

## 2020-11-07 RX ADMIN — AMLODIPINE BESYLATE 10 MG: 10 TABLET ORAL at 09:29

## 2020-11-07 RX ADMIN — SODIUM CHLORIDE: 9 INJECTION, SOLUTION INTRAVENOUS at 09:34

## 2020-11-07 RX ADMIN — INSULIN LISPRO 9 UNITS: 100 INJECTION, SOLUTION INTRAVENOUS; SUBCUTANEOUS at 22:05

## 2020-11-07 RX ADMIN — ATENOLOL 50 MG: 25 TABLET ORAL at 09:23

## 2020-11-07 RX ADMIN — ATORVASTATIN CALCIUM 40 MG: 40 TABLET, FILM COATED ORAL at 09:23

## 2020-11-07 RX ADMIN — INSULIN LISPRO 9 UNITS: 100 INJECTION, SOLUTION INTRAVENOUS; SUBCUTANEOUS at 18:12

## 2020-11-07 ASSESSMENT — PAIN SCALES - GENERAL
PAINLEVEL_OUTOF10: 0

## 2020-11-07 NOTE — PROGRESS NOTES
Assessments charted. /68   Pulse 78   Temp 99.2 °F (37.3 °C) (Axillary)   Resp 18   Ht 5' 2\" (1.575 m) Comment: Previously documented  Wt 255 lb 11.7 oz (116 kg)   SpO2 91%   BMI 46.77 kg/m²   Pt denies any pain, SOB with exertion. Will continue to monitor.

## 2020-11-07 NOTE — PROGRESS NOTES
4 Eyes Admission Assessment     I agree as the admission nurse that 2 RN's have performed a thorough Head to Toe Skin Assessment on the patient. ALL assessment sites listed below have been assessed on admission. Areas assessed by both nurses: ***  [x]   Head, Face, and Ears   [x]   Shoulders, Back, and Chest  [x]   Arms, Elbows, and Hands   [x]   Coccyx, Sacrum, and Ischium  [x]   Legs, Feet, and Heels        Does the Patient have Skin Breakdown?   No         Maximus Prevention initiated:  NA   Wound Care Orders initiated:  NA      WO nurse consulted for Pressure Injury (Stage 3,4, Unstageable, DTI, NWPT, and Complex wounds) or Maximus score 18 or lower:  NA      Nurse 1 eSignature: Electronically signed by Kimmie Hebert RN on 11/6/20 at 8:15 PM EST    **SHARE this note so that the co-signing nurse is able to place an eSignature**    Nurse 2 eSignature: {Esignature:[id number]}

## 2020-11-07 NOTE — PROGRESS NOTES
Clinical Pharmacy Progress Note  Medication History     Admit Date: 11/6/2020    List of current medications patient is taking is complete. Home medication list in EPIC updated to reflect changes noted below. Source of information: patient, outpatient fill records      The following medications were removed from admission medication list:  Cholecalciferol  Multivitamin  Sitagliptin       The following medication instructions/doses were adjusted to reflect how patient is taking:  Allegra and Flonase changed to PRN       Please call with questions.   Amy Jimenez, PharmD, [address] [place] [address] Pharmacy: [phone]  [address] [place] [address]: [phone]  11/6/2020 7:14 PM

## 2020-11-07 NOTE — PROGRESS NOTES
Internal Medicine  PGY 2  Progress Note    Admit Date: 11/6/2020  Diet: DIET CARB CONTROL; Carb Control: 4 carb choices (60 gms)/meal    CC: Dizziness, body aches    Interval history:   - was febrile early .1.  - no other acute event overnight  - Pt is now requiring oxygen 2L NC; confirmed when seen this morning.  - patient seen and examined at bedside. She endorses cough, weakness, SOB. Denies chest pain, palpitations, nausea, vomiting, diarrhea, dysuria. HPI:   \"47 y.o. female with PMH of type 2 DM, HTN, HLD, & ZA p/w productive cough with clear phlegm, body aches and dizziness since last friday. Pt reports decreased appetite and fevers up to 101.1F 2 days ago as well as another one this morning. The body aches started this week and are described as being all over. Two days ago she went to a clinic to get tested for covid and the flu. Flu came back negative, and apparently the covid just got back and is positive. She was told to go to the ED if her symptoms got worse. She states that she tried to stay home but today the symptoms became unbearable so she wanted to get checked out. She believes her daugthers ex-boyfriend had covid and she was exposed to him last Friday. She endorses decreased appetite, chills, nausea, diarrhea (1 episode today), chest pressure and changes in her taste. She denies SOB, vomiting, abdominal pain.     She presented to [facility] ED afebrile and hemodynamically stable, RR 21, satting 95% on room air. EKG revealed NSR and was similar to prior in 2017. CXR revealed faint airspace opacity of the R lung base. Labs were significant for absolute lymphocytes of 0.9, Na 128, Cl 93, glucose 272, Cr 1.9 (baseline Cr 0.7-0.9). UA dirty with 11-20 ep cells, +4 bacteria. She will be transferred to [facility] for further evaluation and treatment of her covid vs CAP pneumonia and MADDIE. \"    Medications:     Scheduled Meds:   dexamethasone  6 mg Oral Daily    insulin glargine  14 Units Subcutaneous Nightly    insulin lispro  0-18 Units Subcutaneous TID     insulin lispro  0-9 Units Subcutaneous Nightly    lactated ringers bolus  2,000 mL Intravenous Once    influenza virus vaccine  0.5 mL Intramuscular Prior to discharge    sodium chloride flush  10 mL Intravenous 2 times per day    amLODIPine  10 mg Oral Daily    atorvastatin  40 mg Oral Daily    atenolol  50 mg Oral Daily    enoxaparin  40 mg Subcutaneous BID     Continuous Infusions:   dextrose       PRN Meds:simethicone, sodium chloride flush, acetaminophen **OR** acetaminophen, polyethylene glycol, promethazine **OR** ondansetron, guaiFENesin-dextromethorphan, glucose, dextrose, glucagon (rDNA), dextrose, labetalol    Objective:   Vitals:   T-max:  Patient Vitals for the past 8 hrs:   BP Temp Temp src Pulse Resp SpO2   11/07/20 1158 105/68 99.2 °F (37.3 °C) Axillary 78 18 91 %   11/07/20 0744 115/69 99.2 °F (37.3 °C) Oral 74 19 95 %   11/07/20 0700 111/67 -- Oral 73 22 97 %       Intake/Output Summary (Last 24 hours) at 11/7/2020 1413  Last data filed at 11/7/2020 1049  Gross per 24 hour   Intake 2852.21 ml   Output 1050 ml   Net 1802.21 ml       Review of Systems  See above    Physical Exam  General appearance:  No apparent distress, appears stated age and cooperative. HEENT:  Normal cephalic,atraumatic without obvious deformity. Pupils equal, round, and reactive to light. Extra ocular muscles intact. Conjunctivae/corneas clear. Neck: Supple, with full range of motion. No jugular venous distention. Trachea midline. Respiratory:  Normal respiratory effort. Clear to auscultation, bilaterally without Rales/Wheezes/Rhonchi. Cardiovascular:  Regular rate and rhythm with normal S1/S2 without murmurs, rubs or gallops. Abdomen: Soft, obese, non-tender, non-distended with normal bowel sounds. Musculoskeletal:  No clubbing, cyanosis. +1 pitting edema bilaterally. Full range of motion without deformity.   Skin: Skin color, texture, turgor normal. Norashes or lesions. Neurologic:  Neurovascularly intact without any focal sensory/motor deficits. Cranialnerves: II-XII intact, grossly non-focal.  Psychiatric:  Alert and oriented, thought content appropriate,normal insight  Capillary Refill: Brisk,< 3 seconds   Peripheral Pulses: +2 palpable, equal bilaterally     LABS:    CBC:   Recent Labs     11/06/20 0752 11/06/20 1722 11/07/20 0839   WBC 5.0 4.8 3.9*   HGB 11.7* 10.7* 10.8*   HCT 34.8* 32.2* 32.2*    165 168   MCV 80.6 82.1 80.8     Renal:    Recent Labs     11/06/20 0752 11/07/20 0839   * 130*   K 4.1 4.2   CL 93* 97*   CO2 22 22   BUN 43* 26*   CREATININE 1.9* 1.1   GLUCOSE 272* 227*   CALCIUM 9.1 8.7   MG  --  2.20   PHOS  --  2.5   ANIONGAP 13 11     Hepatic:   Recent Labs     11/06/20 0752 11/07/20 0839   AST 30 37   ALT 26 30   BILITOT 0.4 0.3   BILIDIR  --  <0.2   PROT 8.6* 7.4   LABALBU 4.0 3.7   ALKPHOS 68 54     Troponin:   Recent Labs     11/06/20 0752   TROPONINI <0.01     BNP: No results for input(s): BNP in the last 72 hours. Lipids: No results for input(s): CHOL, HDL in the last 72 hours. Invalid input(s): LDLCALCU, TRIGLYCERIDE  ABGs:  No results for input(s): PHART, NZW0ZEI, PO2ART, NKP0JHZ, BEART, THGBART, E3HMFFBA, MMQ7JQI in the last 72 hours. INR:   Recent Labs     11/06/20 1722 11/07/20 0839   INR 1.22* 1.17*     Lactate: No results for input(s): LACTATE in the last 72 hours. Cultures:  -----------------------------------------------------------------  RAD:   XR CHEST 1 VIEW   Final Result   Impression: Faint airspace opacity of the right lung base medially. Assessment/Plan:   Covid pneumonia  Sirs + fever 100.8F, RR 24, pt does not appear to be septic. Now requiring oxygen NC 2L.  Covid +.  - f/u viral respiratory panel  - f/u procalcitonin  - daily CRP, d-dimer, ferritin, fibrinogen, LDH, PT-INR  - start decadron 6mg PO daily  - lovenox 40 mg BID per pharmacy  - if O2 reached >=4L, will start remdesivir     MADDIE - improved  Today 1.1. Baseline Cr 0.7-0.9. Cr 1.9 on admission. Likely 2/2 dehydration due to poor PO intake. - urine lytes  - d/c IVF  - monitor UO & renal function     Chronic Diastolic heart failure  No evidence of exacerbation. Echo 6/17 revealed EF 60-65% and grade I diastolic dysfunction    Hyponatremia - improving  Likely 2/2 dehydration. Serum osm calc 286.  - IVF  - usoms, serum osm  - monitor Na     DM type 2  A1c 3/2020 11.8. On home metformin, glimeperide, sitagliptin. Glucose not in control.  227 this AM.  - hold home meds  - increase lantus to 14U  - increase MDSSI to [facility]  - POCT glucose checks, hypoglycemia protocol  - will get HA1C     Secondary HTN  On 5 BP meds, litzy/renin > 30, primary hyperalderonism  - hold home spironolactone and valsartan-HCTZ in setting MADDIE  - continue home amlodipine, atenolol     HLD  - home statin     ZA  - on home CPAP  - supplemental O2 at night    Code Status: Full Code  FEN: DIET CARB CONTROL; Carb Control: 4 carb choices (60 gms)/meal  PPX: Lovenox 40BID  DISPO: Johnathan Salgado MD, PGY-2  11/07/20  2:13 PM    This patient has been staffed and discussed with Tri Samson MD.

## 2020-11-07 NOTE — PLAN OF CARE
Problem: Pain:  Goal: Pain level will decrease  Description: Pain level will decrease  Outcome: Not Met This Shift  Goal: Control of acute pain  Description: Control of acute pain  Outcome: Not Met This Shift  Goal: Control of chronic pain  Description: Control of chronic pain  Outcome: Met This Shift     Problem: Airway Clearance - Ineffective  Goal: Achieve or maintain patent airway  Outcome: Met This Shift     Problem: Gas Exchange - Impaired  Goal: Absence of hypoxia  Outcome: Not Met This Shift  Goal: Promote optimal lung function  Outcome: Not Met This Shift     Problem: Breathing Pattern - Ineffective  Goal: Ability to achieve and maintain a regular respiratory rate  Outcome: Not Met This Shift     Problem: Body Temperature -  Risk of, Imbalanced  Goal: Ability to maintain a body temperature within defined limits  Outcome: Not Met This Shift  Goal: Will regain or maintain usual level of consciousness  Outcome: Met This Shift  Goal: Complications related to the disease process, condition or treatment will be avoided or minimized  Outcome: Not Met This Shift     Problem: Isolation Precautions - Risk of Spread of Infection  Goal: Prevent transmission of infection  Outcome: Met This Shift     Problem: Nutrition Deficits  Goal: Optimize nutrtional status  Outcome: Met This Shift     Problem: Risk for Fluid Volume Deficit  Goal: Maintain normal heart rhythm  Outcome: Met This Shift  Goal: Maintain absence of muscle cramping  Outcome: Met This Shift  Goal: Maintain normal serum potassium, sodium, calcium, phosphorus, and pH  Outcome: Met This Shift     Problem: Loneliness or Risk for Loneliness  Goal: Demonstrate positive use of time alone when socialization is not possible  Outcome: Met This Shift     Problem: Fatigue  Goal: Verbalize increase energy and improved vitality  Outcome: Not Met This Shift     Problem: Patient Education: Go to Patient Education Activity  Goal: Patient/Family Education  Outcome:  Met This Shift

## 2020-11-08 LAB
ALBUMIN SERPL-MCNC: 3.7 G/DL (ref 3.4–5)
ALP BLD-CCNC: 52 U/L (ref 40–129)
ALT SERPL-CCNC: 42 U/L (ref 10–40)
ANION GAP SERPL CALCULATED.3IONS-SCNC: 10 MMOL/L (ref 3–16)
AST SERPL-CCNC: 42 U/L (ref 15–37)
BASOPHILS ABSOLUTE: 0 K/UL (ref 0–0.2)
BASOPHILS RELATIVE PERCENT: 0.4 %
BILIRUB SERPL-MCNC: 0.3 MG/DL (ref 0–1)
BILIRUBIN DIRECT: <0.2 MG/DL (ref 0–0.3)
BILIRUBIN, INDIRECT: ABNORMAL MG/DL (ref 0–1)
BUN BLDV-MCNC: 16 MG/DL (ref 7–20)
CALCIUM SERPL-MCNC: 8.5 MG/DL (ref 8.3–10.6)
CHLORIDE BLD-SCNC: 102 MMOL/L (ref 99–110)
CO2: 22 MMOL/L (ref 21–32)
CREAT SERPL-MCNC: 0.8 MG/DL (ref 0.6–1.1)
D DIMER: 274 NG/ML DDU (ref 0–229)
EOSINOPHILS ABSOLUTE: 0 K/UL (ref 0–0.6)
EOSINOPHILS RELATIVE PERCENT: 0 %
ESTIMATED AVERAGE GLUCOSE: 277.6 MG/DL
FIBRINOGEN: 534 MG/DL (ref 200–397)
GFR AFRICAN AMERICAN: >60
GFR NON-AFRICAN AMERICAN: >60
GLUCOSE BLD-MCNC: 283 MG/DL (ref 70–99)
GLUCOSE BLD-MCNC: 306 MG/DL (ref 70–99)
GLUCOSE BLD-MCNC: 351 MG/DL (ref 70–99)
GLUCOSE BLD-MCNC: 351 MG/DL (ref 70–99)
GLUCOSE BLD-MCNC: 366 MG/DL (ref 70–99)
HBA1C MFR BLD: 11.3 %
HCT VFR BLD CALC: 28.6 % (ref 36–48)
HEMOGLOBIN: 9.8 G/DL (ref 12–16)
INR BLD: 1.17 (ref 0.86–1.14)
LYMPHOCYTES ABSOLUTE: 0.8 K/UL (ref 1–5.1)
LYMPHOCYTES RELATIVE PERCENT: 25.9 %
MAGNESIUM: 2.3 MG/DL (ref 1.8–2.4)
MCH RBC QN AUTO: 27.5 PG (ref 26–34)
MCHC RBC AUTO-ENTMCNC: 34.4 G/DL (ref 31–36)
MCV RBC AUTO: 80 FL (ref 80–100)
MONOCYTES ABSOLUTE: 0.2 K/UL (ref 0–1.3)
MONOCYTES RELATIVE PERCENT: 8.1 %
NEUTROPHILS ABSOLUTE: 2 K/UL (ref 1.7–7.7)
NEUTROPHILS RELATIVE PERCENT: 65.6 %
PDW BLD-RTO: 15 % (ref 12.4–15.4)
PERFORMED ON: ABNORMAL
PHOSPHORUS: 3.1 MG/DL (ref 2.5–4.9)
PLATELET # BLD: 174 K/UL (ref 135–450)
PMV BLD AUTO: 8.8 FL (ref 5–10.5)
POTASSIUM SERPL-SCNC: 4.3 MMOL/L (ref 3.5–5.1)
PROTHROMBIN TIME: 13.6 SEC (ref 10–13.2)
RBC # BLD: 3.57 M/UL (ref 4–5.2)
S PYO THROAT QL CULT: NORMAL
SODIUM BLD-SCNC: 134 MMOL/L (ref 136–145)
TOTAL PROTEIN: 7.3 G/DL (ref 6.4–8.2)
WBC # BLD: 3.1 K/UL (ref 4–11)

## 2020-11-08 PROCEDURE — 80069 RENAL FUNCTION PANEL: CPT

## 2020-11-08 PROCEDURE — 6370000000 HC RX 637 (ALT 250 FOR IP): Performed by: STUDENT IN AN ORGANIZED HEALTH CARE EDUCATION/TRAINING PROGRAM

## 2020-11-08 PROCEDURE — 80076 HEPATIC FUNCTION PANEL: CPT

## 2020-11-08 PROCEDURE — 93005 ELECTROCARDIOGRAM TRACING: CPT | Performed by: INTERNAL MEDICINE

## 2020-11-08 PROCEDURE — 6360000002 HC RX W HCPCS: Performed by: STUDENT IN AN ORGANIZED HEALTH CARE EDUCATION/TRAINING PROGRAM

## 2020-11-08 PROCEDURE — 85610 PROTHROMBIN TIME: CPT

## 2020-11-08 PROCEDURE — 85025 COMPLETE CBC W/AUTO DIFF WBC: CPT

## 2020-11-08 PROCEDURE — 85379 FIBRIN DEGRADATION QUANT: CPT

## 2020-11-08 PROCEDURE — 2060000000 HC ICU INTERMEDIATE R&B

## 2020-11-08 PROCEDURE — 83735 ASSAY OF MAGNESIUM: CPT

## 2020-11-08 PROCEDURE — 2580000003 HC RX 258: Performed by: STUDENT IN AN ORGANIZED HEALTH CARE EDUCATION/TRAINING PROGRAM

## 2020-11-08 PROCEDURE — 85384 FIBRINOGEN ACTIVITY: CPT

## 2020-11-08 RX ADMIN — Medication 10 ML: at 20:13

## 2020-11-08 RX ADMIN — INSULIN LISPRO 9 UNITS: 100 INJECTION, SOLUTION INTRAVENOUS; SUBCUTANEOUS at 09:38

## 2020-11-08 RX ADMIN — ENOXAPARIN SODIUM 40 MG: 40 INJECTION SUBCUTANEOUS at 09:36

## 2020-11-08 RX ADMIN — AMLODIPINE BESYLATE 10 MG: 10 TABLET ORAL at 09:36

## 2020-11-08 RX ADMIN — ENOXAPARIN SODIUM 40 MG: 40 INJECTION SUBCUTANEOUS at 20:13

## 2020-11-08 RX ADMIN — DEXAMETHASONE 6 MG: 4 TABLET ORAL at 09:36

## 2020-11-08 RX ADMIN — INSULIN LISPRO 15 UNITS: 100 INJECTION, SOLUTION INTRAVENOUS; SUBCUTANEOUS at 18:06

## 2020-11-08 RX ADMIN — ATENOLOL 50 MG: 25 TABLET ORAL at 09:36

## 2020-11-08 RX ADMIN — INSULIN LISPRO 7 UNITS: 100 INJECTION, SOLUTION INTRAVENOUS; SUBCUTANEOUS at 22:35

## 2020-11-08 RX ADMIN — INSULIN LISPRO 15 UNITS: 100 INJECTION, SOLUTION INTRAVENOUS; SUBCUTANEOUS at 12:10

## 2020-11-08 RX ADMIN — ATORVASTATIN CALCIUM 40 MG: 40 TABLET, FILM COATED ORAL at 09:36

## 2020-11-08 ASSESSMENT — PAIN SCALES - GENERAL
PAINLEVEL_OUTOF10: 0
PAINLEVEL_OUTOF10: 0
PAINLEVEL_OUTOF10: 5
PAINLEVEL_OUTOF10: 0
PAINLEVEL_OUTOF10: 0

## 2020-11-08 ASSESSMENT — PAIN DESCRIPTION - ORIENTATION: ORIENTATION: LEFT

## 2020-11-08 ASSESSMENT — PAIN DESCRIPTION - PAIN TYPE: TYPE: ACUTE PAIN

## 2020-11-08 ASSESSMENT — PAIN DESCRIPTION - LOCATION: LOCATION: CHEST

## 2020-11-08 ASSESSMENT — PAIN DESCRIPTION - DESCRIPTORS: DESCRIPTORS: ACHING

## 2020-11-08 NOTE — PLAN OF CARE
Problem: Gas Exchange - Impaired  Goal: Absence of hypoxia  Outcome: Met This Shift  Note: Pt sats remained stable. Educated on SOB, breathing techniques and when to alert staff of breathing issues. Pt stated understanding. Will continue to monitor. Problem: Body Temperature -  Risk of, Imbalanced  Goal: Ability to maintain a body temperature within defined limits  Outcome: Ongoing  Note: Pt ran low grade fevers into early afternoon, afebrile remainder of day. Will continue to monitor.

## 2020-11-08 NOTE — PROGRESS NOTES
Pt stated she used RR and had some chest pain under Left breast. EKG ordered, see chart. Followed up with pt and stated chest pain is gone. Will continue to monitor.

## 2020-11-08 NOTE — PLAN OF CARE
Problem: Gas Exchange - Impaired  Goal: Absence of hypoxia  Outcome: Ongoing  Note: Pt sats remain WDL on 2L, Did endorse SOB with exertion. Will continue to monitor. Problem:  Body Temperature -  Risk of, Imbalanced  Goal: Ability to maintain a body temperature within defined limits  Outcome: Met This Shift  Note: Pt afebrile thus far into, Will continue to monitor

## 2020-11-08 NOTE — PROGRESS NOTES
MADDIE.\"    Medications:     Scheduled Meds:   insulin glargine  18 Units Subcutaneous Nightly    dexamethasone  6 mg Oral Daily    insulin lispro  0-18 Units Subcutaneous TID WC    insulin lispro  0-9 Units Subcutaneous Nightly    lactated ringers bolus  2,000 mL Intravenous Once    influenza virus vaccine  0.5 mL Intramuscular Prior to discharge    sodium chloride flush  10 mL Intravenous 2 times per day    amLODIPine  10 mg Oral Daily    atorvastatin  40 mg Oral Daily    atenolol  50 mg Oral Daily    enoxaparin  40 mg Subcutaneous BID     Continuous Infusions:   dextrose       PRN Meds:simethicone, sodium chloride flush, acetaminophen **OR** acetaminophen, polyethylene glycol, promethazine **OR** ondansetron, guaiFENesin-dextromethorphan, glucose, dextrose, glucagon (rDNA), dextrose, labetalol    Objective:   Vitals:   T-max:  Patient Vitals for the past 8 hrs:   BP Temp Temp src Pulse Resp SpO2 Weight   11/08/20 1202 122/76 96.8 °F (36 °C) Oral 60 17 90 % --   11/08/20 0934 (!) 146/79 96.6 °F (35.9 °C) Oral 62 18 92 % --   11/08/20 0457 (!) 97/59 96.6 °F (35.9 °C) Oral 66 16 91 % 262 lb 5.6 oz (119 kg)       Intake/Output Summary (Last 24 hours) at 11/8/2020 1235  Last data filed at 11/8/2020 7343  Gross per 24 hour   Intake 1620 ml   Output 4600 ml   Net -2980 ml       Review of Systems  See above    Physical Exam  General appearance:  No apparent distress, appears stated age and cooperative. HEENT:  Normal cephalic,atraumatic without obvious deformity. Pupils equal, round, and reactive to light. Extra ocular muscles intact. Conjunctivae/corneas clear. Neck: Supple, with full range of motion. No jugular venous distention. Trachea midline. Respiratory:  Normal respiratory effort. Bilateral lower lobe crackles. Cardiovascular:  Regular rate and rhythm with normal S1/S2 without murmurs, rubs or gallops. Abdomen: Soft, obese, non-tender, non-distended with normal bowel sounds.   Musculoskeletal:  No clubbing, cyanosis. Trace pitting edema bilaterally. Full range of motion without deformity. Skin: Skin color, texture, turgor normal.  Norashes or lesions. Neurologic:  Neurovascularly intact without any focal sensory/motor deficits. Cranialnerves: II-XII intact, grossly non-focal.  Psychiatric:  Alert and oriented, thought content appropriate,normal insight  Capillary Refill: Brisk,< 3 seconds   Peripheral Pulses: +2 palpable, equal bilaterally     LABS:    CBC:   Recent Labs     11/06/20 1722 11/07/20  0839 11/08/20  0510   WBC 4.8 3.9* 3.1*   HGB 10.7* 10.8* 9.8*   HCT 32.2* 32.2* 28.6*    168 174   MCV 82.1 80.8 80.0     Renal:    Recent Labs     11/06/20 0752 11/07/20 0839 11/08/20  0510   * 130* 134*   K 4.1 4.2 4.3   CL 93* 97* 102   CO2 22 22 22   BUN 43* 26* 16   CREATININE 1.9* 1.1 0.8   GLUCOSE 272* 227* 306*   CALCIUM 9.1 8.7 8.5   MG  --  2.20 2.30   PHOS  --  2.5 3.1   ANIONGAP 13 11 10     Hepatic:   Recent Labs     11/06/20 0752 11/07/20 0839 11/08/20  0510   AST 30 37 42*   ALT 26 30 42*   BILITOT 0.4 0.3 0.3   BILIDIR  --  <0.2 <0.2   PROT 8.6* 7.4 7.3   LABALBU 4.0 3.7 3.7   ALKPHOS 68 54 52     Troponin:   Recent Labs     11/06/20 0752   TROPONINI <0.01     BNP: No results for input(s): BNP in the last 72 hours. Lipids: No results for input(s): CHOL, HDL in the last 72 hours. Invalid input(s): LDLCALCU, TRIGLYCERIDE  ABGs:  No results for input(s): PHART, TDS6QGQ, PO2ART, VXM1YNK, BEART, THGBART, J3RLCZOB, TYR1GKT in the last 72 hours. INR:   Recent Labs     11/06/20 1722 11/07/20  0839 11/08/20  0510   INR 1.22* 1.17* 1.17*     Lactate: No results for input(s): LACTATE in the last 72 hours. Cultures:  -----------------------------------------------------------------  RAD:   XR CHEST 1 VIEW   Final Result   Impression: Faint airspace opacity of the right lung base medially.           Assessment/Plan:   Covid pneumonia  Sirs + fever 100.8F, RR 24, pt does not appear

## 2020-11-09 ENCOUNTER — TELEPHONE (OUTPATIENT)
Dept: INTERNAL MEDICINE CLINIC | Age: 55
End: 2020-11-09

## 2020-11-09 LAB
ALBUMIN SERPL-MCNC: 4 G/DL (ref 3.4–5)
ALP BLD-CCNC: 58 U/L (ref 40–129)
ALT SERPL-CCNC: 41 U/L (ref 10–40)
ANION GAP SERPL CALCULATED.3IONS-SCNC: 11 MMOL/L (ref 3–16)
AST SERPL-CCNC: 29 U/L (ref 15–37)
BASOPHILS ABSOLUTE: 0 K/UL (ref 0–0.2)
BASOPHILS RELATIVE PERCENT: 0.1 %
BILIRUB SERPL-MCNC: 0.3 MG/DL (ref 0–1)
BILIRUBIN DIRECT: <0.2 MG/DL (ref 0–0.3)
BILIRUBIN, INDIRECT: ABNORMAL MG/DL (ref 0–1)
BUN BLDV-MCNC: 15 MG/DL (ref 7–20)
CALCIUM SERPL-MCNC: 9.1 MG/DL (ref 8.3–10.6)
CHLORIDE BLD-SCNC: 99 MMOL/L (ref 99–110)
CO2: 24 MMOL/L (ref 21–32)
CREAT SERPL-MCNC: 0.8 MG/DL (ref 0.6–1.1)
D DIMER: 221 NG/ML DDU (ref 0–229)
EKG ATRIAL RATE: 60 BPM
EKG DIAGNOSIS: NORMAL
EKG P AXIS: 56 DEGREES
EKG P-R INTERVAL: 186 MS
EKG Q-T INTERVAL: 430 MS
EKG QRS DURATION: 84 MS
EKG QTC CALCULATION (BAZETT): 430 MS
EKG R AXIS: 36 DEGREES
EKG T AXIS: 15 DEGREES
EKG VENTRICULAR RATE: 60 BPM
EOSINOPHILS ABSOLUTE: 0 K/UL (ref 0–0.6)
EOSINOPHILS RELATIVE PERCENT: 0 %
FIBRINOGEN: 534 MG/DL (ref 200–397)
GFR AFRICAN AMERICAN: >60
GFR NON-AFRICAN AMERICAN: >60
GLUCOSE BLD-MCNC: 290 MG/DL (ref 70–99)
GLUCOSE BLD-MCNC: 292 MG/DL (ref 70–99)
GLUCOSE BLD-MCNC: 332 MG/DL (ref 70–99)
GLUCOSE BLD-MCNC: 346 MG/DL (ref 70–99)
GLUCOSE BLD-MCNC: 404 MG/DL (ref 70–99)
HCT VFR BLD CALC: 32.1 % (ref 36–48)
HEMOGLOBIN: 11 G/DL (ref 12–16)
INR BLD: 1.13 (ref 0.86–1.14)
LYMPHOCYTES ABSOLUTE: 1 K/UL (ref 1–5.1)
LYMPHOCYTES RELATIVE PERCENT: 18.1 %
MAGNESIUM: 2.4 MG/DL (ref 1.8–2.4)
MCH RBC QN AUTO: 27.4 PG (ref 26–34)
MCHC RBC AUTO-ENTMCNC: 34.2 G/DL (ref 31–36)
MCV RBC AUTO: 80.2 FL (ref 80–100)
MONOCYTES ABSOLUTE: 0.4 K/UL (ref 0–1.3)
MONOCYTES RELATIVE PERCENT: 6.3 %
NEUTROPHILS ABSOLUTE: 4.2 K/UL (ref 1.7–7.7)
NEUTROPHILS RELATIVE PERCENT: 75.5 %
PDW BLD-RTO: 15 % (ref 12.4–15.4)
PERFORMED ON: ABNORMAL
PHOSPHORUS: 2.4 MG/DL (ref 2.5–4.9)
PLATELET # BLD: 231 K/UL (ref 135–450)
PMV BLD AUTO: 8.9 FL (ref 5–10.5)
POTASSIUM SERPL-SCNC: 4.1 MMOL/L (ref 3.5–5.1)
PROTHROMBIN TIME: 13.1 SEC (ref 10–13.2)
RBC # BLD: 4.01 M/UL (ref 4–5.2)
SODIUM BLD-SCNC: 134 MMOL/L (ref 136–145)
TOTAL PROTEIN: 8.1 G/DL (ref 6.4–8.2)
WBC # BLD: 5.6 K/UL (ref 4–11)

## 2020-11-09 PROCEDURE — 85384 FIBRINOGEN ACTIVITY: CPT

## 2020-11-09 PROCEDURE — 85025 COMPLETE CBC W/AUTO DIFF WBC: CPT

## 2020-11-09 PROCEDURE — 93010 ELECTROCARDIOGRAM REPORT: CPT | Performed by: INTERNAL MEDICINE

## 2020-11-09 PROCEDURE — 2580000003 HC RX 258: Performed by: STUDENT IN AN ORGANIZED HEALTH CARE EDUCATION/TRAINING PROGRAM

## 2020-11-09 PROCEDURE — 80076 HEPATIC FUNCTION PANEL: CPT

## 2020-11-09 PROCEDURE — 2060000000 HC ICU INTERMEDIATE R&B

## 2020-11-09 PROCEDURE — 85610 PROTHROMBIN TIME: CPT

## 2020-11-09 PROCEDURE — 85379 FIBRIN DEGRADATION QUANT: CPT

## 2020-11-09 PROCEDURE — 94761 N-INVAS EAR/PLS OXIMETRY MLT: CPT

## 2020-11-09 PROCEDURE — 94150 VITAL CAPACITY TEST: CPT

## 2020-11-09 PROCEDURE — 6360000002 HC RX W HCPCS: Performed by: STUDENT IN AN ORGANIZED HEALTH CARE EDUCATION/TRAINING PROGRAM

## 2020-11-09 PROCEDURE — 80069 RENAL FUNCTION PANEL: CPT

## 2020-11-09 PROCEDURE — 2700000000 HC OXYGEN THERAPY PER DAY

## 2020-11-09 PROCEDURE — 83735 ASSAY OF MAGNESIUM: CPT

## 2020-11-09 PROCEDURE — 6370000000 HC RX 637 (ALT 250 FOR IP): Performed by: STUDENT IN AN ORGANIZED HEALTH CARE EDUCATION/TRAINING PROGRAM

## 2020-11-09 PROCEDURE — 94664 DEMO&/EVAL PT USE INHALER: CPT

## 2020-11-09 RX ORDER — SPIRONOLACTONE 25 MG/1
25 TABLET ORAL DAILY
Status: DISCONTINUED | OUTPATIENT
Start: 2020-11-09 | End: 2020-11-10

## 2020-11-09 RX ORDER — VALSARTAN AND HYDROCHLOROTHIAZIDE 320; 25 MG/1; MG/1
1 TABLET, FILM COATED ORAL DAILY
Status: DISCONTINUED | OUTPATIENT
Start: 2020-11-09 | End: 2020-11-11 | Stop reason: HOSPADM

## 2020-11-09 RX ORDER — INSULIN LISPRO 100 [IU]/ML
10 INJECTION, SOLUTION INTRAVENOUS; SUBCUTANEOUS
Status: DISCONTINUED | OUTPATIENT
Start: 2020-11-09 | End: 2020-11-10

## 2020-11-09 RX ORDER — FUROSEMIDE 10 MG/ML
20 INJECTION INTRAMUSCULAR; INTRAVENOUS ONCE
Status: COMPLETED | OUTPATIENT
Start: 2020-11-09 | End: 2020-11-09

## 2020-11-09 RX ADMIN — INSULIN LISPRO 12 UNITS: 100 INJECTION, SOLUTION INTRAVENOUS; SUBCUTANEOUS at 11:56

## 2020-11-09 RX ADMIN — AMLODIPINE BESYLATE 10 MG: 10 TABLET ORAL at 08:35

## 2020-11-09 RX ADMIN — INSULIN LISPRO 10 UNITS: 100 INJECTION, SOLUTION INTRAVENOUS; SUBCUTANEOUS at 18:45

## 2020-11-09 RX ADMIN — Medication 10 ML: at 20:28

## 2020-11-09 RX ADMIN — INSULIN LISPRO 9 UNITS: 100 INJECTION, SOLUTION INTRAVENOUS; SUBCUTANEOUS at 08:37

## 2020-11-09 RX ADMIN — INSULIN LISPRO 10 UNITS: 100 INJECTION, SOLUTION INTRAVENOUS; SUBCUTANEOUS at 11:56

## 2020-11-09 RX ADMIN — ENOXAPARIN SODIUM 40 MG: 40 INJECTION SUBCUTANEOUS at 20:28

## 2020-11-09 RX ADMIN — FUROSEMIDE 20 MG: 10 INJECTION, SOLUTION INTRAMUSCULAR; INTRAVENOUS at 11:52

## 2020-11-09 RX ADMIN — INSULIN LISPRO 12 UNITS: 100 INJECTION, SOLUTION INTRAVENOUS; SUBCUTANEOUS at 18:45

## 2020-11-09 RX ADMIN — Medication 10 ML: at 08:38

## 2020-11-09 RX ADMIN — INSULIN LISPRO 9 UNITS: 100 INJECTION, SOLUTION INTRAVENOUS; SUBCUTANEOUS at 22:21

## 2020-11-09 RX ADMIN — INSULIN LISPRO 10 UNITS: 100 INJECTION, SOLUTION INTRAVENOUS; SUBCUTANEOUS at 08:35

## 2020-11-09 RX ADMIN — ENOXAPARIN SODIUM 40 MG: 40 INJECTION SUBCUTANEOUS at 08:36

## 2020-11-09 RX ADMIN — INSULIN GLARGINE 28 UNITS: 100 INJECTION, SOLUTION SUBCUTANEOUS at 22:19

## 2020-11-09 RX ADMIN — VALSARTAN AND HYDROCHLOROTHIAZIDE 1 TABLET: 320; 25 TABLET, FILM COATED ORAL at 14:22

## 2020-11-09 RX ADMIN — ATORVASTATIN CALCIUM 40 MG: 40 TABLET, FILM COATED ORAL at 08:36

## 2020-11-09 RX ADMIN — DEXAMETHASONE 6 MG: 4 TABLET ORAL at 08:40

## 2020-11-09 ASSESSMENT — PAIN SCALES - GENERAL: PAINLEVEL_OUTOF10: 0

## 2020-11-09 NOTE — CARE COORDINATION
Case Management Note    Patient unavailable at this time for assessment      CM attempted to meet with patient for completion of initial face to face assessment at this time. Consult noted for assistance with discharge planning. Patient currently not available by phone, CM unable to complete full assessment due to [address]. CM has reviewed chart and noted patient from home with spouse, has a POSITIVE COVID test, weaning off O2, no noted DME or HHC prior to admission, no needs anticipated at discharge at this time. CM will follow up when patient available and will complete initial assessment and assist with needs for discharge.     Waldo Lazo RN  [facility] [employer]  Case Management Department  Ph: [phone]  Fax: [phone]

## 2020-11-09 NOTE — DISCHARGE SUMMARY
INTERNAL MEDICINE DEPARTMENT AT [address] [place] [address]  DISCHARGE SUMMARY    Patient ID: Xavier Bettencourt                                             Discharge Date: 11/11/2020   Patient's PCP: Jez Lee MD                                          Discharge Physician: Rula Cedeno MD  Admit Date: 11/6/2020   Admitting Physician: No admitting provider for patient encounter. DISCHARGE DIAGNOSES:  - Acute hypoxic respiratory failure  - COVID-19 pneumonia  - MADDIE  - Hyponatremia  - DM type 2  - Secondary HTN  - HFpEF     Hospital Course:      49 y. o. female with PMH of type 2 DM, HTN, HLD, & ZA p/w productive cough with clear phlegm, body aches and dizziness for 1 week. Pt reports decreased appetite and fevers up to 101.1F 2 days ago as well as another one this morning. The diffuse body aches started this week. Recently tested for flu & covid. Flu came back negative, & covid positive. She came to ED because symptoms getting worse. She endorses decreased appetite, chills, nausea, diarrhea (1 episode today), chest pressure and changes in her taste. She denies SOB, vomiting, abdominal pain.     She presented to [facility] ED afebrile and hemodynamically stable, RR 21, satting 95% on room air. EKG revealed NSR and was similar to prior in 2017. CXR revealed faint airspace opacity of the R lung base. Labs were significant for absolute lymphocytes of 0.9, Na 128, Cl 93, glucose 272, Cr 1.9 (baseline Cr 0.7-0.9). UA dirty with 11-20 ep cells, +4 bacteria. She will be transferred to [facility] for further evaluation and treatment of her covid pneumonia and MADDIE. Pt started requiring 2L O2, so she was started on steroids. Steroid use complicated her BG regime, and pt kept requiring increasing lantus and lispro to get blood sugar under control. Pt respiratory status remained stable, her satts have remained in the 90s. She does desatt when off O2 and ambulating, so she will be sent home with 2L O2.      Will discharge her today with 5 days of decadron 6 mg daily to be completed at home. Please take lovenox 40 mg twice a day. Please self isolate for 2 weeks (or more if still symptomatic with cough, SOB). We will discharge her with instructions to resume her home metformin and glimeperide and have added 50U lantus nightly, humalog 20U TID with meals and high dose sliding scale insulin. She is to check her blood glucose 2 hours after meals and use those values to determine number of units to give. Schedule an appointment to see your PCP and nephrologist after isolation. Pt instructed to go to lab in 2-3 weeks. We have decreased her atenolol dose to 25mg due to asymptomatic bradycardia.               Take insulin according to glucose check 2 hours after meals:            0           140-199 2           200-249 4           250-299 6          300-349 8         350-400 10         >400 12           Take insulin according to glucose check before sleep:         0       140-199 1       200-249 2       250-299 3      300-349 4      350-400 5      >400 6      Physical Exam:  /84   Pulse 67   Temp 97.3 °F (36.3 °C) (Oral)   Resp 22   Ht 5' 2\" (1.575 m) Comment: Previously documented  Wt 253 lb 12 oz (115.1 kg)   SpO2 93%   BMI 46.41 kg/m²     General appearance:  No apparent distress, appears stated age and cooperative. HEENT:  Normal cephalic,atraumatic without obvious deformity. Pupils equal, round, and reactive to light.  Extra ocular muscles intact. Conjunctivae/corneas clear. Neck: Supple, with full range of motion. No jugular venous distention. Trachea midline. Respiratory:  Normal respiratory effort, no wheezes, rhonchi, rales  Cardiovascular:  Regular rate and rhythm with normal S1/S2 without murmurs, rubs or gallops. Abdomen: Soft, obese, non-tender, non-distended with normal bowel sounds. Musculoskeletal:  No clubbing, cyanosis.  Trace pitting edema bilaterally.  Full range of motion without deformity. Skin: Skin color, texture, turgor normal.  Norashes or lesions. Neurologic:  Neurovascularly intact without any focal sensory/motor deficits. Cranialnerves: II-XII intact, grossly non-focal.  Psychiatric:  Alert and oriented, thought content appropriate,normal insight  Capillary Refill: Brisk,< 3 seconds   Peripheral Pulses: +2 palpable, equal bilaterally     Consults: none  Significant Diagnostic Studies:   XR CHEST 1 VIEW   Final Result   Impression: Faint airspace opacity of the right lung base medially. Disposition: home  Discharged Condition: Stable  Follow Up: Primary Care Physician in two weeks & nephrologist in 2 weeks.     DISCHARGE MEDICATION:     Medication List      START taking these medications    dexamethasone 6 MG tablet  Commonly known as:  DECADRON  Take 1 tablet by mouth daily for 5 days  Start taking on:  November 12, 2020     enoxaparin 40 MG/0.4ML injection  Commonly known as:  LOVENOX  Inject 0.4 mLs into the skin 2 times daily for 23 days     guaiFENesin-dextromethorphan 100-10 MG/5ML syrup  Commonly known as:  ROBITUSSIN DM  Take 5 mLs by mouth every 4 hours as needed for Cough     insulin lispro (1 Unit Dial) 100 UNIT/ML Sopn  20U insulin before meals 3x/day    Take insulin according to glucose check 2 hours after meals:   0  140-199 2  200-249 4  250-299 6  300-349 8  350-400 10  >400 12    Take insulin according to glucose check before sleep:   0  140-199 1  200-249 2  250-299 3  300-349 4  350-400 5  >400 6        CHANGE how you take these medications    atenolol 50 MG tablet  Commonly known as:  TENORMIN  Take 0.5 tablets by mouth daily  What changed:  how much to take     Lantus SoloStar 100 UNIT/ML injection pen  Generic drug:  insulin glargine  Inject 50 Units into the skin nightly  What changed:  how much to take        CONTINUE taking these medications    [employer] Blood Glucose Meter w/Device Kit  Inject 1 Applicatorful into the skin 2 times daily (before meals)     amLODIPine 10 MG tablet  Commonly known as:  NORVASC  TAKE ONE TABLET BY MOUTH DAILY     atorvastatin 40 MG tablet  Commonly known as:  LIPITOR  Take 1 tablet by mouth daily Replaces Simvastatin (due to drug interaction with Amlodipine)     fluticasone 50 MCG/ACT nasal spray  Commonly known as:  FLONASE     glimepiride 4 MG tablet  Commonly known as:  AMARYL  TAKE ONE AND ONE-HALF TABLET BY MOUTH EVERY MORNING BEFORE BREAKFAST     Insulin Pen Needle 29G X 12.7MM Misc  1 each by Does not apply route daily     metFORMIN 500 MG extended release tablet  Commonly known as:  GLUCOPHAGE-XR  TAKE FOUR TABLETS BY MOUTH DAILY WITH BREAKFAST     ONE TOUCH ULTRA TEST strip  Generic drug:  blood glucose test strips  USE TO TEST THREE TIMES A DAY     OneTouch Delica Lancets 03J Misc  Test 3 times daily E11.9     spironolactone 25 MG tablet  Commonly known as:  ALDACTONE  Take 1 tablet by mouth daily     valsartan-hydroCHLOROthiazide 320-25 MG per tablet  Commonly known as:  DIOVAN-HCT  TAKE ONE TABLET BY MOUTH DAILY        STOP taking these medications    Allegra Allergy 180 MG tablet  Generic drug:  fexofenadine           Where to Get Your Medications      These medications were sent to [facility] [address] [place] [address] [address] [phone]  . [address], [address] [facility] [address]    Phone:  [phone]   · atenolol 50 MG tablet  · dexamethasone 6 MG tablet  · enoxaparin 40 MG/0.4ML injection  · guaiFENesin-dextromethorphan 100-10 MG/5ML syrup  · Lantus SoloStar 100 UNIT/ML injection pen     You can get these medications from any pharmacy    Bring a paper prescription for each of these medications  · insulin lispro (1 Unit Dial) 100 UNIT/ML Sopn       Activity: activity as tolerated  Diet: diabetic diet  Wound Care: none needed    Time Spent on discharge is more than 30 minutes    The patient /caregiver was advised to consult their PCP/ or call 911 to proceed to nearest ED in the event if symptoms are not getting better and are getting worse . The note was completed using EMR. Every effort was made to ensure accuracy; however, inadvertent computerized transcription errors may be present. Signed:   Breana Donovan,  DO   PGY-1  11/11/2020      Please refer to my addendum done on progress note for 11/11/2020    Letha Carrington MD  Hospitalist

## 2020-11-09 NOTE — TELEPHONE ENCOUNTER
Patient is calling to inform that she was admitted to [facility] for COVID, Pneumonia and Dehydration.

## 2020-11-09 NOTE — PROGRESS NOTES
Internal Medicine  PGY 1  Progress Note    Admit Date: 11/6/2020  Diet: DIET CARB CONTROL; Carb Control: 4 carb choices (60 gms)/meal    CC: Dizziness, body aches    Interval history:     NAEON. Pt seen at bedside this AM, pt feels about the same as yesterday, overall getting better. Denies sob, chest pain, abdominal pain, n/v. She does get that upper chest discomfort with deep breaths, but it appears to be getting better. PT still endorses lightheadedness. HPI:   \"47 y.o. female with PMH of type 2 DM, HTN, HLD, & ZA p/w productive cough with clear phlegm, body aches and dizziness since last friday. Pt reports decreased appetite and fevers up to 101.1F 2 days ago as well as another one this morning. The body aches started this week and are described as being all over. Two days ago she went to a clinic to get tested for covid and the flu. Flu came back negative, and apparently the covid just got back and is positive. She was told to go to the ED if her symptoms got worse. She states that she tried to stay home but today the symptoms became unbearable so she wanted to get checked out. She believes her daugthers ex-boyfriend had covid and she was exposed to him last Friday. She endorses decreased appetite, chills, nausea, diarrhea (1 episode today), chest pressure and changes in her taste. She denies SOB, vomiting, abdominal pain.     She presented to [facility] ED afebrile and hemodynamically stable, RR 21, satting 95% on room air. EKG revealed NSR and was similar to prior in 2017. CXR revealed faint airspace opacity of the R lung base. Labs were significant for absolute lymphocytes of 0.9, Na 128, Cl 93, glucose 272, Cr 1.9 (baseline Cr 0.7-0.9). UA dirty with 11-20 ep cells, +4 bacteria. She will be transferred to [facility] for further evaluation and treatment of her covid vs CAP pneumonia and MADDIE. \"    Medications:     Scheduled Meds:   insulin glargine  25 Units Subcutaneous Nightly    insulin lispro  10 Units sounds. Musculoskeletal:  No clubbing, cyanosis. Trace pitting edema bilaterally. Full range of motion without deformity. Skin: Skin color, texture, turgor normal.  Norashes or lesions. Neurologic:  Neurovascularly intact without any focal sensory/motor deficits. Cranialnerves: II-XII intact, grossly non-focal.  Psychiatric:  Alert and oriented, thought content appropriate,normal insight  Capillary Refill: Brisk,< 3 seconds   Peripheral Pulses: +2 palpable, equal bilaterally     LABS:    CBC:   Recent Labs     11/07/20 0839 11/08/20 0510 11/09/20 0511   WBC 3.9* 3.1* 5.6   HGB 10.8* 9.8* 11.0*   HCT 32.2* 28.6* 32.1*    174 231   MCV 80.8 80.0 80.2     Renal:    Recent Labs     11/07/20 0839 11/08/20 0510 11/09/20 0511   * 134* 134*   K 4.2 4.3 4.1   CL 97* 102 99   CO2 22 22 24   BUN 26* 16 15   CREATININE 1.1 0.8 0.8   GLUCOSE 227* 306* 292*   CALCIUM 8.7 8.5 9.1   MG 2.20 2.30 2.40   PHOS 2.5 3.1 2.4*   ANIONGAP 11 10 11     Hepatic:   Recent Labs     11/07/20 0839 11/08/20 0510 11/09/20 0511   AST 37 42* 29   ALT 30 42* 41*   BILITOT 0.3 0.3 0.3   BILIDIR <0.2 <0.2 <0.2   PROT 7.4 7.3 8.1   LABALBU 3.7 3.7 4.0   ALKPHOS 54 52 58     Troponin:   No results for input(s): TROPONINI in the last 72 hours. BNP: No results for input(s): BNP in the last 72 hours. Lipids: No results for input(s): CHOL, HDL in the last 72 hours. Invalid input(s): LDLCALCU, TRIGLYCERIDE  ABGs:  No results for input(s): PHART, VPE3NTG, PO2ART, MDG0ZWT, BEART, THGBART, A9RVDDUR, WHN5YWA in the last 72 hours. INR:   Recent Labs     11/07/20 0839 11/08/20 0510 11/09/20  0511   INR 1.17* 1.17* 1.13     Lactate: No results for input(s): LACTATE in the last 72 hours. Cultures:  -----------------------------------------------------------------  RAD:   XR CHEST 1 VIEW   Final Result   Impression: Faint airspace opacity of the right lung base medially.           Assessment/Plan:   Covid pneumonia  Sirs + fever 100.8F, RR 24, pt does not appear to be septic. Now requiring oxygen NC 2L. Covid +. No O2 on admission, now 2L.  - f/u viral respiratory panel  - f/u procalcitonin  - d-dimer trending down 519 -> 274 -> 221  - ferritin 670, CRP 62,   - fibrinogen trending up 473 -> 534, stable  - PT 14.2 -> 13.6, INR 1.22 -> 1.17  - Continue decadron 6 mg PO daily, day #3  - lovenox 40 mg BID per pharmacy  - if O2 reached >=4L, will start remdesivir     MADDIE - resolved  Today 1.1. Baseline Cr 0.7-0.9. Cr 1.9 on admission. Likely 2/2 dehydration due to poor PO intake. - urine lytes  - d/c IVF  - monitor UO & renal function     Chronic Diastolic heart failure  No evidence of exacerbation. Echo 6/17 revealed EF 60-65% and grade I diastolic dysfunction     Hyponatremia - improving  Likely 2/2 dehydration. Serum osm calc 286.  - IVF  - usoms, serum osm  - monitor Na     DM type 2  A1c 3/2020 11.8. On home metformin, glimeperide, & lantus 10U. Glucose not in control.  292 this AM.  - hold home metformin &glimeperide  - increased lantus from 18U to 25U  - started lispro 10U TID  - increase MDSSI to HDSSI  - POCT glucose checks, hypoglycemia protocol  - HA1C 11.3     Secondary HTN  On 5 BP meds, litzy/renin > 30, primary hyperalderonism  - re-started home spironolactone and valsartan-HCTZ  - continue home amlodipine, atenolol (hold if SBP < 110 or HR < 65)     HLD  - atorvastatin     ZA  - on home CPAP  - supplemental O2 at night    Code Status: Full Code  FEN: DIET CARB CONTROL; Carb Control: 4 carb choices (60 gms)/meal  PPX: Lovenox 40BID  DISPO: [address] [facility], PGY-1  11/09/20  11:47 AM    This patient has been staffed and discussed with Jose Robles MD.

## 2020-11-10 LAB
ALBUMIN SERPL-MCNC: 4.1 G/DL (ref 3.4–5)
ALP BLD-CCNC: 60 U/L (ref 40–129)
ALT SERPL-CCNC: 36 U/L (ref 10–40)
ANION GAP SERPL CALCULATED.3IONS-SCNC: 13 MMOL/L (ref 3–16)
AST SERPL-CCNC: 23 U/L (ref 15–37)
BASOPHILS ABSOLUTE: 0 K/UL (ref 0–0.2)
BASOPHILS RELATIVE PERCENT: 0.3 %
BILIRUB SERPL-MCNC: 0.3 MG/DL (ref 0–1)
BILIRUBIN DIRECT: <0.2 MG/DL (ref 0–0.3)
BILIRUBIN, INDIRECT: NORMAL MG/DL (ref 0–1)
BUN BLDV-MCNC: 19 MG/DL (ref 7–20)
CALCIUM SERPL-MCNC: 9.2 MG/DL (ref 8.3–10.6)
CHLORIDE BLD-SCNC: 97 MMOL/L (ref 99–110)
CO2: 24 MMOL/L (ref 21–32)
CREAT SERPL-MCNC: 0.9 MG/DL (ref 0.6–1.1)
D DIMER: <200 NG/ML DDU (ref 0–229)
EOSINOPHILS ABSOLUTE: 0 K/UL (ref 0–0.6)
EOSINOPHILS RELATIVE PERCENT: 0 %
FIBRINOGEN: 486 MG/DL (ref 200–397)
GFR AFRICAN AMERICAN: >60
GFR NON-AFRICAN AMERICAN: >60
GLUCOSE BLD-MCNC: 261 MG/DL (ref 70–99)
GLUCOSE BLD-MCNC: 266 MG/DL (ref 70–99)
GLUCOSE BLD-MCNC: 268 MG/DL (ref 70–99)
GLUCOSE BLD-MCNC: 286 MG/DL (ref 70–99)
GLUCOSE BLD-MCNC: 326 MG/DL (ref 70–99)
HCT VFR BLD CALC: 32.8 % (ref 36–48)
HEMOGLOBIN: 11.2 G/DL (ref 12–16)
INR BLD: 1.05 (ref 0.86–1.14)
LYMPHOCYTES ABSOLUTE: 1.2 K/UL (ref 1–5.1)
LYMPHOCYTES RELATIVE PERCENT: 15.3 %
MAGNESIUM: 2.3 MG/DL (ref 1.8–2.4)
MCH RBC QN AUTO: 27.3 PG (ref 26–34)
MCHC RBC AUTO-ENTMCNC: 34.1 G/DL (ref 31–36)
MCV RBC AUTO: 80.1 FL (ref 80–100)
MONOCYTES ABSOLUTE: 0.6 K/UL (ref 0–1.3)
MONOCYTES RELATIVE PERCENT: 7.8 %
NEUTROPHILS ABSOLUTE: 5.8 K/UL (ref 1.7–7.7)
NEUTROPHILS RELATIVE PERCENT: 76.6 %
PDW BLD-RTO: 14.8 % (ref 12.4–15.4)
PERFORMED ON: ABNORMAL
PHOSPHORUS: 3.1 MG/DL (ref 2.5–4.9)
PLATELET # BLD: 283 K/UL (ref 135–450)
PMV BLD AUTO: 8.9 FL (ref 5–10.5)
POTASSIUM SERPL-SCNC: 4.1 MMOL/L (ref 3.5–5.1)
PROTHROMBIN TIME: 12.2 SEC (ref 10–13.2)
RBC # BLD: 4.1 M/UL (ref 4–5.2)
SODIUM BLD-SCNC: 134 MMOL/L (ref 136–145)
TOTAL PROTEIN: 8.1 G/DL (ref 6.4–8.2)
WBC # BLD: 7.5 K/UL (ref 4–11)

## 2020-11-10 PROCEDURE — 85610 PROTHROMBIN TIME: CPT

## 2020-11-10 PROCEDURE — 2700000000 HC OXYGEN THERAPY PER DAY

## 2020-11-10 PROCEDURE — 6360000002 HC RX W HCPCS: Performed by: STUDENT IN AN ORGANIZED HEALTH CARE EDUCATION/TRAINING PROGRAM

## 2020-11-10 PROCEDURE — 6370000000 HC RX 637 (ALT 250 FOR IP): Performed by: STUDENT IN AN ORGANIZED HEALTH CARE EDUCATION/TRAINING PROGRAM

## 2020-11-10 PROCEDURE — 83735 ASSAY OF MAGNESIUM: CPT

## 2020-11-10 PROCEDURE — 80076 HEPATIC FUNCTION PANEL: CPT

## 2020-11-10 PROCEDURE — 6360000002 HC RX W HCPCS: Performed by: INTERNAL MEDICINE

## 2020-11-10 PROCEDURE — 2060000000 HC ICU INTERMEDIATE R&B

## 2020-11-10 PROCEDURE — 85379 FIBRIN DEGRADATION QUANT: CPT

## 2020-11-10 PROCEDURE — 94761 N-INVAS EAR/PLS OXIMETRY MLT: CPT

## 2020-11-10 PROCEDURE — 85025 COMPLETE CBC W/AUTO DIFF WBC: CPT

## 2020-11-10 PROCEDURE — 80069 RENAL FUNCTION PANEL: CPT

## 2020-11-10 PROCEDURE — 94680 O2 UPTK RST&XERS DIR SIMPLE: CPT

## 2020-11-10 PROCEDURE — 85384 FIBRINOGEN ACTIVITY: CPT

## 2020-11-10 PROCEDURE — 2580000003 HC RX 258: Performed by: STUDENT IN AN ORGANIZED HEALTH CARE EDUCATION/TRAINING PROGRAM

## 2020-11-10 RX ORDER — INSULIN LISPRO 100 [IU]/ML
20 INJECTION, SOLUTION INTRAVENOUS; SUBCUTANEOUS
Status: DISCONTINUED | OUTPATIENT
Start: 2020-11-10 | End: 2020-11-11

## 2020-11-10 RX ORDER — FUROSEMIDE 10 MG/ML
20 INJECTION INTRAMUSCULAR; INTRAVENOUS ONCE
Status: COMPLETED | OUTPATIENT
Start: 2020-11-10 | End: 2020-11-10

## 2020-11-10 RX ORDER — SPIRONOLACTONE 25 MG/1
25 TABLET ORAL DAILY
Status: DISCONTINUED | OUTPATIENT
Start: 2020-11-10 | End: 2020-11-11 | Stop reason: HOSPADM

## 2020-11-10 RX ADMIN — ATENOLOL 50 MG: 25 TABLET ORAL at 09:12

## 2020-11-10 RX ADMIN — AMLODIPINE BESYLATE 10 MG: 10 TABLET ORAL at 09:13

## 2020-11-10 RX ADMIN — Medication 10 ML: at 09:13

## 2020-11-10 RX ADMIN — INSULIN LISPRO 9 UNITS: 100 INJECTION, SOLUTION INTRAVENOUS; SUBCUTANEOUS at 09:09

## 2020-11-10 RX ADMIN — INSULIN LISPRO 12 UNITS: 100 INJECTION, SOLUTION INTRAVENOUS; SUBCUTANEOUS at 12:08

## 2020-11-10 RX ADMIN — VALSARTAN AND HYDROCHLOROTHIAZIDE 1 TABLET: 320; 25 TABLET, FILM COATED ORAL at 09:16

## 2020-11-10 RX ADMIN — FUROSEMIDE 20 MG: 10 INJECTION, SOLUTION INTRAMUSCULAR; INTRAVENOUS at 11:12

## 2020-11-10 RX ADMIN — ATORVASTATIN CALCIUM 40 MG: 40 TABLET, FILM COATED ORAL at 09:13

## 2020-11-10 RX ADMIN — Medication 10 ML: at 20:59

## 2020-11-10 RX ADMIN — INSULIN GLARGINE 45 UNITS: 100 INJECTION, SOLUTION SUBCUTANEOUS at 23:54

## 2020-11-10 RX ADMIN — INSULIN LISPRO 20 UNITS: 100 INJECTION, SOLUTION INTRAVENOUS; SUBCUTANEOUS at 17:58

## 2020-11-10 RX ADMIN — INSULIN LISPRO 20 UNITS: 100 INJECTION, SOLUTION INTRAVENOUS; SUBCUTANEOUS at 09:10

## 2020-11-10 RX ADMIN — INSULIN LISPRO 5 UNITS: 100 INJECTION, SOLUTION INTRAVENOUS; SUBCUTANEOUS at 23:55

## 2020-11-10 RX ADMIN — INSULIN LISPRO 9 UNITS: 100 INJECTION, SOLUTION INTRAVENOUS; SUBCUTANEOUS at 17:58

## 2020-11-10 RX ADMIN — INSULIN LISPRO 20 UNITS: 100 INJECTION, SOLUTION INTRAVENOUS; SUBCUTANEOUS at 12:08

## 2020-11-10 RX ADMIN — SPIRONOLACTONE 25 MG: 25 TABLET, FILM COATED ORAL at 11:12

## 2020-11-10 RX ADMIN — DEXAMETHASONE 6 MG: 4 TABLET ORAL at 09:12

## 2020-11-10 RX ADMIN — ENOXAPARIN SODIUM 40 MG: 40 INJECTION SUBCUTANEOUS at 20:58

## 2020-11-10 RX ADMIN — ENOXAPARIN SODIUM 40 MG: 40 INJECTION SUBCUTANEOUS at 09:13

## 2020-11-10 ASSESSMENT — PAIN SCALES - GENERAL
PAINLEVEL_OUTOF10: 0

## 2020-11-10 NOTE — PROGRESS NOTES
Internal Medicine  PGY 1  Progress Note    Admit Date: 11/6/2020  Diet: DIET CARB CONTROL; Carb Control: 4 carb choices (60 gms)/meal; Low Potassium    CC: Dizziness, body aches    Interval history:     NAEON. O2 req stable. Pt seen at bedside this AM, reports she's feeling better today. Denies sob, chest pain, N/V, fevers. Still has some chest discomfort with deep inspirations but it is improving. Urinating well, tolerating food. HPI:   \"47 y.o. female with PMH of type 2 DM, HTN, HLD, & ZA p/w productive cough with clear phlegm, body aches and dizziness since last friday. Pt reports decreased appetite and fevers up to 101.1F 2 days ago as well as another one this morning. The body aches started this week and are described as being all over. Two days ago she went to a clinic to get tested for covid and the flu. Flu came back negative, and apparently the covid just got back and is positive. She was told to go to the ED if her symptoms got worse. She states that she tried to stay home but today the symptoms became unbearable so she wanted to get checked out. She believes her daugthers ex-boyfriend had covid and she was exposed to him last Friday. She endorses decreased appetite, chills, nausea, diarrhea (1 episode today), chest pressure and changes in her taste. She denies SOB, vomiting, abdominal pain.     She presented to [facility] ED afebrile and hemodynamically stable, RR 21, satting 95% on room air. EKG revealed NSR and was similar to prior in 2017. CXR revealed faint airspace opacity of the R lung base. Labs were significant for absolute lymphocytes of 0.9, Na 128, Cl 93, glucose 272, Cr 1.9 (baseline Cr 0.7-0.9). UA dirty with 11-20 ep cells, +4 bacteria. She will be transferred to [facility] for further evaluation and treatment of her covid vs CAP pneumonia and MADDIE. \"    Medications:     Scheduled Meds:   insulin glargine  35 Units Subcutaneous Nightly    insulin lispro  20 Units Subcutaneous TID WC    [Held by provider] spironolactone  25 mg Oral Daily    valsartan-hydroCHLOROthiazide  1 tablet Oral Daily    dexamethasone  6 mg Oral Daily    insulin lispro  0-18 Units Subcutaneous TID     insulin lispro  0-9 Units Subcutaneous Nightly    lactated ringers bolus  2,000 mL Intravenous Once    influenza virus vaccine  0.5 mL Intramuscular Prior to discharge    sodium chloride flush  10 mL Intravenous 2 times per day    amLODIPine  10 mg Oral Daily    atorvastatin  40 mg Oral Daily    atenolol  50 mg Oral Daily    enoxaparin  40 mg Subcutaneous BID     Continuous Infusions:   dextrose       PRN Meds:simethicone, sodium chloride flush, acetaminophen **OR** acetaminophen, polyethylene glycol, promethazine **OR** ondansetron, guaiFENesin-dextromethorphan, glucose, dextrose, glucagon (rDNA), dextrose, labetalol    Objective:   Vitals:   T-max:  Patient Vitals for the past 8 hrs:   BP Temp Temp src Pulse Resp SpO2 Weight   11/10/20 0633 -- -- -- -- -- -- 257 lb 0.9 oz (116.6 kg)   11/10/20 0400 135/80 97 °F (36.1 °C) Oral 51 20 93 % --       Intake/Output Summary (Last 24 hours) at 11/10/2020 0817  Last data filed at 11/10/2020 0400  Gross per 24 hour   Intake 780 ml   Output 1500 ml   Net -720 ml       Review of Systems  See above    Physical Exam  General appearance:  No apparent distress, appears stated age and cooperative. HEENT:  Normal cephalic,atraumatic without obvious deformity. Pupils equal, round, and reactive to light. Extra ocular muscles intact. Conjunctivae/corneas clear. Neck: Supple, with full range of motion. No jugular venous distention. Trachea midline. Respiratory:  Normal respiratory effort, no wheezes, rhonchi, rales  Cardiovascular:  Regular rate and rhythm with normal S1/S2 without murmurs, rubs or gallops. Abdomen: Soft, obese, non-tender, non-distended with normal bowel sounds. Musculoskeletal:  No clubbing, cyanosis. Trace pitting edema bilaterally.   Full range of motion without deformity. Skin: Skin color, texture, turgor normal.  Norashes or lesions. Neurologic:  Neurovascularly intact without any focal sensory/motor deficits. Cranialnerves: II-XII intact, grossly non-focal.  Psychiatric:  Alert and oriented, thought content appropriate,normal insight  Capillary Refill: Brisk,< 3 seconds   Peripheral Pulses: +2 palpable, equal bilaterally     LABS:    CBC:   Recent Labs     11/08/20  0510 11/09/20  0511 11/10/20  0518   WBC 3.1* 5.6 7.5   HGB 9.8* 11.0* 11.2*   HCT 28.6* 32.1* 32.8*    231 283   MCV 80.0 80.2 80.1     Renal:    Recent Labs     11/08/20  0510 11/09/20  0511 11/10/20  0518   * 134* 134*   K 4.3 4.1 4.1    99 97*   CO2 22 24 24   BUN 16 15 19   CREATININE 0.8 0.8 0.9   GLUCOSE 306* 292* 266*   CALCIUM 8.5 9.1 9.2   MG 2.30 2.40 2.30   PHOS 3.1 2.4* 3.1   ANIONGAP 10 11 13     Hepatic:   Recent Labs     11/08/20  0510 11/09/20  0511 11/10/20  0518   AST 42* 29 23   ALT 42* 41* 36   BILITOT 0.3 0.3 0.3   BILIDIR <0.2 <0.2 <0.2   PROT 7.3 8.1 8.1   LABALBU 3.7 4.0 4.1   ALKPHOS 52 58 60     Troponin:   No results for input(s): TROPONINI in the last 72 hours. BNP: No results for input(s): BNP in the last 72 hours. Lipids: No results for input(s): CHOL, HDL in the last 72 hours. Invalid input(s): LDLCALCU, TRIGLYCERIDE  ABGs:  No results for input(s): PHART, ETQ5JBO, PO2ART, OBL8VJK, BEART, THGBART, X9VFRSYL, LGF9MGJ in the last 72 hours. INR:   Recent Labs     11/08/20  0510 11/09/20  0511 11/10/20  0518   INR 1.17* 1.13 1.05     Lactate: No results for input(s): LACTATE in the last 72 hours. Cultures:  -----------------------------------------------------------------  RAD:   XR CHEST 1 VIEW   Final Result   Impression: Faint airspace opacity of the right lung base medially. Assessment/Plan:   Covid pneumonia  Sirs + fever 100.8F, RR 24, pt does not appear to be septic. Now requiring oxygen NC 2L. Covid +.  No O2 on admission, now 2L.  - f/u viral respiratory panel  - f/u procalcitonin  - d-dimer trending down 519 -> 274 -> 221, now less than 200  - ferritin 670, CRP 62,   - fibrinogen trending down 534 -> 486  - PT 14.2 -> 13.6, INR 1.22 -> 1.17  - Continue decadron 6 mg PO daily, day #4  - formal O2 evaluation today, see if pt needs O2  - lovenox 40 mg BID per pharmacy  - if O2 reached >=4L, will start remdesivir     MADDIE - resolved  Today 1.1. Baseline Cr 0.7-0.9. Cr 1.9 on admission. Likely 2/2 dehydration due to poor PO intake. - monitor UO & renal function     Chronic Diastolic heart failure  No evidence of exacerbation. Echo 6/17 revealed EF 60-65% and grade I diastolic dysfunction  - 1X IV lasix    Hyponatremia - improving  Likely 2/2 dehydration. Serum osm calc 286. - Na 134  - monitor Na     DM type 2  A1c 3/2020 11.8. On home metformin, glimeperide, & lantus 10U. Glucose not in control.  292 this AM.  - hold home metformin &glimeperide  - increased lantus from 35U to 45U  - Increased lispro 10U -> 20U TID  - increase MDSSI to HDSSI  - POCT glucose checks, hypoglycemia protocol  - HA1C 11.3  - Expect to discharge on lantus + meal time humalog     Secondary HTN  On 5 BP meds, litzy/renin > 30, primary hyperalderonism  - Cont home valsartan-HCTZ  - continue home amlodipine, atenolol (hold if SBP < 110 or HR < 65)  - cont spironolactone  - low K diet     HLD  - atorvastatin     ZA  - on home CPAP  - supplemental O2 at night    Code Status: Full Code  FEN: DIET CARB CONTROL; Carb Control: 4 carb choices (60 gms)/meal; Low Potassium  PPX: Lovenox 40BID  DISPO: Discharge today/tomorrow if on [address] [place] [address], PGY-1  11/10/20  8:17 AM    This patient has been staffed and discussed with Jonathon Estrada MD.

## 2020-11-10 NOTE — PROGRESS NOTES
[facility]    Respiratory Therapy     Home Oxygen Evaluation        Name: Gabriel Wallace  Medical Record Number: [id number]  Age: 49 y.o. Gender:  female   : 1965  Today's date: 11/10/2020  Room: 1428/8107-72      Assessment        /77   Pulse 56   Temp 97.4 °F (36.3 °C) (Oral)   Resp 20   Ht 5' 2\" (1.575 m) Comment: Previously documented  Wt 257 lb 0.9 oz (116.6 kg)   SpO2 94%   BMI 47.02 kg/m²     Patient Active Problem List   Diagnosis    Hyperlipidemia    Essential hypertension, benign    ZA (obstructive sleep apnea)    Perimenopause    Vitamin D deficiency    Left ankle pain    Type 2 diabetes mellitus with microalbuminuria (HCC)    Knee pain, chronic    Iron deficiency anemia due to chronic blood loss    Mild intermittent asthma without complication    Colon polyps    Abnormal nuclear cardiac imaging test    Elevated serum protein level    Allergic rhinitis    Diabetes mellitus type 2, uncontrolled, with complications (HCC)    Essential hypertension    Alopecia    Central centrifugal scarring alopecia    Pneumonia    COVID-19    MADDIE [facility]) ([facility] [address]    Hyponatremia       Social History:  Social History     Tobacco Use    Smoking status: Never Smoker    Smokeless tobacco: Never Used    Tobacco comment: passive smoke exposure    Substance Use Topics    Alcohol use:  Yes     Alcohol/week: 2.0 standard drinks     Types: 2 Glasses of wine per week     Frequency: 2-4 times a month     Drinks per session: 1 or 2     Binge frequency: Never     Comment: occasionally     Drug use: No       Patient Room Air saturation at rest 90  %  Patient Room Air saturation upon ambulation 85 %    Oxygen saturations of 88% or less on RA qualifies patient for Home Oxygen    Patient resting on 0  lmp  with an oxygen saturation of  90 %     Patient ambulated on 2 lpm with an oxygen saturation of 92%  Qualifying patient for home oxygen with ambulation and continuous flow @ 2 lpm.      In your clinical assessment does the Patient Require Portable Oxygen Tanks? Yes               Patient/caregiver was educated on Home Oxygen process:  Yes      Level of patient/caregiver understanding able to:   [] Verbalize understanding   [] Demonstrate understanding       [] Teach back        [] Needs reinforcement        []  No available caregiver               []  Other:     Response to education:  Very Good     Time Spent with Home O2 Set Up:  10  minutes     Margarette Hobbs RCP on 11/10/2020 at 12:04 PM           [facility]    Respiratory Therapy     Home Oxygen Evaluation        Name: Shanthi Youssef  Medical Record Number: [id number]  Age: 49 y.o. Gender:  female   : 1965  Today's date: 11/10/2020  Room: [id number]      Assessment        /77   Pulse 56   Temp 97.4 °F (36.3 °C) (Oral)   Resp 20   Ht 5' 2\" (1.575 m) Comment: Previously documented  Wt 257 lb 0.9 oz (116.6 kg)   SpO2 94%   BMI 47.02 kg/m²     Patient Active Problem List   Diagnosis    Hyperlipidemia    Essential hypertension, benign    ZA (obstructive sleep apnea)    Perimenopause    Vitamin D deficiency    Left ankle pain    Type 2 diabetes mellitus with microalbuminuria (HCC)    Knee pain, chronic    Iron deficiency anemia due to chronic blood loss    Mild intermittent asthma without complication    Colon polyps    Abnormal nuclear cardiac imaging test    Elevated serum protein level    Allergic rhinitis    Diabetes mellitus type 2, uncontrolled, with complications (HCC)    Essential hypertension    Alopecia    Central centrifugal scarring alopecia    Pneumonia    COVID-19    MADDIE (acute kidney injury) ([facility] [address]    Hyponatremia       Social History:  Social History     Tobacco Use    Smoking status: Never Smoker    Smokeless tobacco: Never Used    Tobacco comment: passive smoke exposure    Substance Use Topics    Alcohol use:  Yes     Alcohol/week: 2.0 standard drinks Types: 2 Glasses of wine per week     Frequency: 2-4 times a month     Drinks per session: 1 or 2     Binge frequency: Never     Comment: occasionally     Drug use: No       Patient Room Air saturation at rest90%  Patient Room Air saturation upon ambulation 85 %    Oxygen saturations of 88% or less on RA qualifies patient for Home Oxygen    Patient resting on 0  lmp  with an oxygen saturation of  90 %     Patient ambulated on 0 lpm with an oxygen saturation of 85%    Patient ambulated on 2 lpm with an oxygen saturation of 92%        Qualifying patient for home oxygen with ambulation and continuous flow  @ 2 lpm.      In your clinical assessment does the Patient Require Portable Oxygen Tanks?     Yes               Patient/caregiver was educated on Home Oxygen process:  Yes      Level of patient/caregiver understanding able to:   [] Verbalize understanding   [] Demonstrate understanding       [] Teach back        [] Needs reinforcement        []  No available caregiver               []  Other:     Response to education:  Very Good     Time Spent with Home O2 Set Up:  15  minutes     Oumar Andres RCP on 11/10/2020 at 12:04 PM                 .     .

## 2020-11-10 NOTE — ACP (ADVANCE CARE PLANNING)
Advance Care Planning   Advance Care Planning Clinical Specialist  Conversation Note      Date of ACP Conversation: 11/10/2020    Conversation Conducted with:   Patient with [address]: Next of Kin by law (only applies in absence of above)     CP Clinical Specialist: AYLA Boyd      *When Decision Maker makes decisions on behalf of the incapacitated patient: Decision Maker is asked to consider and make decisions based on patient values, known preferences, or best interests. Current Designated Health Care Decision Maker:   (as entered in [address] field. Validate  this information as still accurate & up-to-date; edit [address] field as needed.)    If no Decision Maker listed above or available through scanned documents, then:    [address]   Who do you trust to make healthcare decisions for you? Name:   Martine Mendoza (spouse)  Phone  Number: [phone]  Can this person be reached and be able to respond quickly, such as within a few minutes or hours? Yes    Yes    Who would be your back-up decision maker? Name Fahad Hernandez (daughter)  Phone Number:[phone]    For below questions, when conducting conversation with [address], substitute \"she\" and \"her\" for \"you\" and \"your\". Hospitalization  If your health were to worsen and it became clear that your chance of recovery was unlikely, what would your preferences be regarding hospitalization?:    Choice:  []  The patient would want hospitalization  [x]  The patient would prefer comfort-focused treatment without hospitalization. Ventilation  If you were in your present state of health and suddenly became very ill and were unable to breathe on your own, what would your preference be about the use of a ventilator (breathing machine) if it were available to you?       If patient would desire the use of a ventilator (breathing machine), answer \"yes\", if not \"no\":yes   Yes    If your health were to worsen and it became clear that your chance of recovery was unlikely, would that change your answer? No  No    Resuscitation  CPR works best to restart the heart when there is a sudden event, like a heart attack, in someone who is otherwise healthy. Unfortunately, CPR does not typically restart the heart for people who have serious health conditions or who are very sick. In the event your heart stopped, would you want attempts to restart your heart (answer \"yes\") or would you prefer a natural death (answer \"no\")? yes   Yes    If your health were to worsen and it became clear that your chance of recovery was unlikely, would that change your answer? Yes  Yes    [x] Yes  [] No   Educated Patient / Susan Whittaker regarding differences between Advance Directives and portable DNR orders.    I want to be resusciation    Length of ACP Conversation in minutes:  10  minutes    Conversation Outcomes:  [] ACP discussion completed  [] Existing advance directive reviewed with patient; no changes to patient's previously recorded wishes   [] New Advance Directive completed   [] Portable Do Not Rescitate prepared for Provider review and signature  [] POLST/POST/MOLST/MOST prepared for Provider review and signature      Follow-up plan:    [] Schedule follow-up conversation to continue planning  [] Referred individual to Provider for additional questions/concerns   [] Advised patient/agent/surrogate to review completed ACP document and update if needed with changes in condition, patient preferences or care setting     [] This note routed to one or more involved healthcare providers

## 2020-11-10 NOTE — PLAN OF CARE
Problem: Pain:  Goal: Pain level will decrease  Description: Pain level will decrease  Outcome: Ongoing   Patient denies any pain this shift. Problem: Airway Clearance - Ineffective  Goal: Achieve or maintain patent airway  11/9/2020 2337 by Feliberto Hampton RN  Outcome: Ongoing   Lungs diminished, faint crackles heard now and then in bases of lungs. Lasix one time given on day shift. I&O adequate. @ liters requirement for oxygen sat of 92%. Problem: Body Temperature -  Risk of, Imbalanced  Goal: Ability to maintain a body temperature within defined limits  Outcome: Ongoing   Remains afebrile this shift. Problem: Isolation Precautions - Risk of Spread of Infection  Goal: Prevent transmission of infection  Outcome: Ongoing   Continue isolation for Covid-19. High touch surfaces cleaned.

## 2020-11-10 NOTE — PLAN OF CARE
Problem: Airway Clearance - Ineffective  Goal: Achieve or maintain patent airway  Outcome: Ongoing     Problem: Gas Exchange - Impaired  Goal: Absence of hypoxia  Outcome: Ongoing     Problem: Gas Exchange - Impaired  Goal: Promote optimal lung function  Outcome: Ongoing   Patient 91% on 2 L O2 via nasal cannula this shift.  Incentive spirometer given to patient and education provided by nursing and by respiratory therapist.

## 2020-11-10 NOTE — CARE COORDINATION
Case Management Assessment           Initial Evaluation                Date / Time of Evaluation: 11/10/2020 12:50 PM                 Assessment Completed by: Jalen Sotomayor    Patient Name: Tara Lucero     YOB: 1965  Diagnosis: Pneumonia [J18.9]     Date / Time: 11/6/2020  7:35 AM    Patient Admission Status: Inpatient    If patient is discharged prior to next notation, then this note serves as note for discharge by case management. Current PCP: Lizzy Thomson MD  Clinic Patient: No    Chart Reviewed: Yes  Patient/ Family Interviewed: Yes    Initial assessment completed at bedside with: patient over the phone due to COVID-19 positive. Hospitalization in the last 30 days: No    Emergency Contacts:  Extended Emergency Contact Information  Primary Emergency Contact: Jame Ragland  Address: [address] [facility], [address] [place] [address] Phone: [phone]  Relation: Spouse  Secondary Emergency Contact: [address] [place] [address] Phone: [phone]  Relation: Child    Advance Directives:   Code Status: Full [address]: No  Financial  Payor: Baylee Armenta / Plan: Baylee ENNIS PPO / Product Type: *No Product type* /     Pre-cert required for SNF: Yes    Pharmacy    [facility] [address] [place] [address], [address] [place] [address]  Phone: [phone] Fax: [phone]      Potential assistance Purchasing Medications: Potential Assistance Purchasing Medications: No  Does Patient want to participate in local refill/ meds to beds program?: Yes    Meds To Beds General Rules:  1. Can ONLY be done Monday- Friday between 8:30am-5pm  2. Prescription(s) must be in pharmacy by 3pm to be filled same day  3. Copy of patient's insurance/ prescription drug card and patient face sheet must be sent along with the prescription(s)  4. Cost of Rx cannot be added to hospital bill.  If patient representative Rosa Nguyen and her family were provided with a choice of provider and agrees with the discharge plan Yes    Freedom of choice list was provided with basic dialogue that supports the patient's individualized plan of care/goals and shares the quality data associated with the providers.  Yes    Care Transition patient: Yes    Valerie Mckeon RN  [facility]  Case Management Department  Ph: [phone]   Fax: [phone]

## 2020-11-10 NOTE — PLAN OF CARE
Problem: Pain:  Goal: Pain level will decrease  Description: Pain level will decrease  Outcome: Pt has denies having any pain so far this shift. Will continue to monitor. Problem: Gas Exchange - Impaired  Goal: Absence of hypoxia  Outcome: Pt still requiring 2L of oxygen when ambulating in room. Pt ox sat drops to 84-85% when on room air ambulating in room. Will continue to reassess oxygen needs and oxygen saturation. Problem: Falls - Risk of:  Goal: Will remain free from falls  Description: Will remain free from falls  Outcome: Pt up ad diamond. Pt has non-slip socks on to prevent falls. Pt is A&Ox4, steady gait, appropriate safety judgement, and low fall risk.

## 2020-11-11 ENCOUNTER — TELEPHONE (OUTPATIENT)
Dept: INTERNAL MEDICINE CLINIC | Age: 55
End: 2020-11-11

## 2020-11-11 VITALS
BODY MASS INDEX: 46.7 KG/M2 | HEIGHT: 62 IN | DIASTOLIC BLOOD PRESSURE: 84 MMHG | RESPIRATION RATE: 22 BRPM | OXYGEN SATURATION: 93 % | TEMPERATURE: 97.3 F | HEART RATE: 67 BPM | SYSTOLIC BLOOD PRESSURE: 124 MMHG | WEIGHT: 253.75 LBS

## 2020-11-11 LAB
ALBUMIN SERPL-MCNC: 3.9 G/DL (ref 3.4–5)
ALP BLD-CCNC: 67 U/L (ref 40–129)
ALT SERPL-CCNC: 49 U/L (ref 10–40)
ANION GAP SERPL CALCULATED.3IONS-SCNC: 13 MMOL/L (ref 3–16)
AST SERPL-CCNC: 34 U/L (ref 15–37)
BASOPHILS ABSOLUTE: 0 K/UL (ref 0–0.2)
BASOPHILS RELATIVE PERCENT: 0.5 %
BILIRUB SERPL-MCNC: 0.4 MG/DL (ref 0–1)
BILIRUBIN DIRECT: <0.2 MG/DL (ref 0–0.3)
BILIRUBIN, INDIRECT: ABNORMAL MG/DL (ref 0–1)
BUN BLDV-MCNC: 29 MG/DL (ref 7–20)
CALCIUM SERPL-MCNC: 9.4 MG/DL (ref 8.3–10.6)
CHLORIDE BLD-SCNC: 95 MMOL/L (ref 99–110)
CO2: 24 MMOL/L (ref 21–32)
CREAT SERPL-MCNC: 1 MG/DL (ref 0.6–1.1)
D DIMER: <200 NG/ML DDU (ref 0–229)
EOSINOPHILS ABSOLUTE: 0 K/UL (ref 0–0.6)
EOSINOPHILS RELATIVE PERCENT: 0 %
FIBRINOGEN: 462 MG/DL (ref 200–397)
GFR AFRICAN AMERICAN: >60
GFR NON-AFRICAN AMERICAN: 58
GLUCOSE BLD-MCNC: 237 MG/DL (ref 70–99)
GLUCOSE BLD-MCNC: 240 MG/DL (ref 70–99)
GLUCOSE BLD-MCNC: 379 MG/DL (ref 70–99)
HCT VFR BLD CALC: 34.8 % (ref 36–48)
HEMOGLOBIN: 11.7 G/DL (ref 12–16)
INR BLD: 1.09 (ref 0.86–1.14)
LYMPHOCYTES ABSOLUTE: 1.7 K/UL (ref 1–5.1)
LYMPHOCYTES RELATIVE PERCENT: 16.6 %
MAGNESIUM: 2.2 MG/DL (ref 1.8–2.4)
MCH RBC QN AUTO: 27.3 PG (ref 26–34)
MCHC RBC AUTO-ENTMCNC: 33.6 G/DL (ref 31–36)
MCV RBC AUTO: 81.3 FL (ref 80–100)
MONOCYTES ABSOLUTE: 0.8 K/UL (ref 0–1.3)
MONOCYTES RELATIVE PERCENT: 7.4 %
NEUTROPHILS ABSOLUTE: 7.6 K/UL (ref 1.7–7.7)
NEUTROPHILS RELATIVE PERCENT: 75.5 %
ORGANISM: ABNORMAL
PDW BLD-RTO: 14.9 % (ref 12.4–15.4)
PERFORMED ON: ABNORMAL
PERFORMED ON: ABNORMAL
PHOSPHORUS: 4.3 MG/DL (ref 2.5–4.9)
PLATELET # BLD: 346 K/UL (ref 135–450)
PMV BLD AUTO: 9.4 FL (ref 5–10.5)
POTASSIUM SERPL-SCNC: 3.9 MMOL/L (ref 3.5–5.1)
PROTHROMBIN TIME: 12.7 SEC (ref 10–13.2)
RBC # BLD: 4.29 M/UL (ref 4–5.2)
REPORT: NORMAL
RESPIRATORY PANEL PCR: ABNORMAL
SODIUM BLD-SCNC: 132 MMOL/L (ref 136–145)
TOTAL PROTEIN: 8.1 G/DL (ref 6.4–8.2)
WBC # BLD: 10.1 K/UL (ref 4–11)

## 2020-11-11 PROCEDURE — 85384 FIBRINOGEN ACTIVITY: CPT

## 2020-11-11 PROCEDURE — 83735 ASSAY OF MAGNESIUM: CPT

## 2020-11-11 PROCEDURE — 6370000000 HC RX 637 (ALT 250 FOR IP): Performed by: STUDENT IN AN ORGANIZED HEALTH CARE EDUCATION/TRAINING PROGRAM

## 2020-11-11 PROCEDURE — 2580000003 HC RX 258: Performed by: STUDENT IN AN ORGANIZED HEALTH CARE EDUCATION/TRAINING PROGRAM

## 2020-11-11 PROCEDURE — 6360000002 HC RX W HCPCS: Performed by: STUDENT IN AN ORGANIZED HEALTH CARE EDUCATION/TRAINING PROGRAM

## 2020-11-11 PROCEDURE — 80069 RENAL FUNCTION PANEL: CPT

## 2020-11-11 PROCEDURE — 85379 FIBRIN DEGRADATION QUANT: CPT

## 2020-11-11 PROCEDURE — 0202U NFCT DS 22 TRGT SARS-COV-2: CPT

## 2020-11-11 PROCEDURE — 85025 COMPLETE CBC W/AUTO DIFF WBC: CPT

## 2020-11-11 PROCEDURE — 85610 PROTHROMBIN TIME: CPT

## 2020-11-11 PROCEDURE — 80076 HEPATIC FUNCTION PANEL: CPT

## 2020-11-11 RX ORDER — GUAIFENESIN/DEXTROMETHORPHAN 100-10MG/5
5 SYRUP ORAL EVERY 4 HOURS PRN
Qty: 120 ML | Refills: 0 | Status: SHIPPED | OUTPATIENT
Start: 2020-11-11 | End: 2020-11-21

## 2020-11-11 RX ORDER — SENNA PLUS 8.6 MG/1
1 TABLET ORAL NIGHTLY
Status: DISCONTINUED | OUTPATIENT
Start: 2020-11-11 | End: 2020-11-11 | Stop reason: HOSPADM

## 2020-11-11 RX ORDER — INSULIN LISPRO 100 [IU]/ML
INJECTION, SOLUTION INTRAVENOUS; SUBCUTANEOUS
Qty: 30 ML | Refills: 0 | Status: SHIPPED | OUTPATIENT
Start: 2020-11-11 | End: 2020-12-16 | Stop reason: SDUPTHER

## 2020-11-11 RX ORDER — INSULIN GLARGINE 100 [IU]/ML
50 INJECTION, SOLUTION SUBCUTANEOUS NIGHTLY
Qty: 5 PEN | Refills: 3 | Status: SHIPPED | OUTPATIENT
Start: 2020-11-11 | End: 2021-01-29 | Stop reason: SDUPTHER

## 2020-11-11 RX ORDER — INSULIN LISPRO 100 [IU]/ML
25 INJECTION, SOLUTION INTRAVENOUS; SUBCUTANEOUS
Status: DISCONTINUED | OUTPATIENT
Start: 2020-11-11 | End: 2020-11-11

## 2020-11-11 RX ORDER — ATENOLOL 50 MG/1
25 TABLET ORAL DAILY
Qty: 30 TABLET | Refills: 5 | Status: SHIPPED | OUTPATIENT
Start: 2020-11-11 | End: 2022-06-17 | Stop reason: SDUPTHER

## 2020-11-11 RX ORDER — INSULIN LISPRO 100 [IU]/ML
27 INJECTION, SOLUTION INTRAVENOUS; SUBCUTANEOUS
Status: DISCONTINUED | OUTPATIENT
Start: 2020-11-11 | End: 2020-11-11 | Stop reason: HOSPADM

## 2020-11-11 RX ORDER — POLYETHYLENE GLYCOL 3350 17 G/17G
17 POWDER, FOR SOLUTION ORAL DAILY
Status: DISCONTINUED | OUTPATIENT
Start: 2020-11-11 | End: 2020-11-11 | Stop reason: HOSPADM

## 2020-11-11 RX ORDER — INSULIN LISPRO 100 [IU]/ML
INJECTION, SOLUTION INTRAVENOUS; SUBCUTANEOUS
Qty: 30 ML | Refills: 0 | Status: SHIPPED | OUTPATIENT
Start: 2020-11-11 | End: 2020-11-11

## 2020-11-11 RX ORDER — ATENOLOL 25 MG/1
25 TABLET ORAL DAILY
Status: DISCONTINUED | OUTPATIENT
Start: 2020-11-12 | End: 2020-11-11 | Stop reason: HOSPADM

## 2020-11-11 RX ORDER — DEXAMETHASONE 6 MG/1
6 TABLET ORAL DAILY
Qty: 5 TABLET | Refills: 0 | Status: SHIPPED | OUTPATIENT
Start: 2020-11-12 | End: 2020-11-17

## 2020-11-11 RX ADMIN — INSULIN LISPRO 27 UNITS: 100 INJECTION, SOLUTION INTRAVENOUS; SUBCUTANEOUS at 09:16

## 2020-11-11 RX ADMIN — ATORVASTATIN CALCIUM 40 MG: 40 TABLET, FILM COATED ORAL at 09:06

## 2020-11-11 RX ADMIN — AMLODIPINE BESYLATE 10 MG: 10 TABLET ORAL at 09:06

## 2020-11-11 RX ADMIN — INSULIN LISPRO 6 UNITS: 100 INJECTION, SOLUTION INTRAVENOUS; SUBCUTANEOUS at 09:12

## 2020-11-11 RX ADMIN — VALSARTAN AND HYDROCHLOROTHIAZIDE 1 TABLET: 320; 25 TABLET, FILM COATED ORAL at 11:50

## 2020-11-11 RX ADMIN — INSULIN LISPRO 27 UNITS: 100 INJECTION, SOLUTION INTRAVENOUS; SUBCUTANEOUS at 11:58

## 2020-11-11 RX ADMIN — SPIRONOLACTONE 25 MG: 25 TABLET, FILM COATED ORAL at 09:06

## 2020-11-11 RX ADMIN — Medication 10 ML: at 09:17

## 2020-11-11 RX ADMIN — ENOXAPARIN SODIUM 40 MG: 40 INJECTION SUBCUTANEOUS at 09:07

## 2020-11-11 RX ADMIN — POLYETHYLENE GLYCOL 3350 17 G: 17 POWDER, FOR SOLUTION ORAL at 09:08

## 2020-11-11 RX ADMIN — INSULIN LISPRO 15 UNITS: 100 INJECTION, SOLUTION INTRAVENOUS; SUBCUTANEOUS at 14:19

## 2020-11-11 RX ADMIN — DEXAMETHASONE 6 MG: 4 TABLET ORAL at 11:48

## 2020-11-11 ASSESSMENT — PAIN SCALES - GENERAL: PAINLEVEL_OUTOF10: 0

## 2020-11-11 NOTE — PLAN OF CARE
Problem: Pain:  Goal: Pain level will decrease  Description: Pain level will decrease  11/11/2020 0039 by Yasmeen Quiroz RN  Outcome: Ongoing  Note: Denies pain at this time. Will update as needed. Problem: Gas Exchange - Impaired  Goal: Absence of hypoxia  11/11/2020 0039 by Yasmeen Quiroz RN  Outcome: Ongoing  Note: SpO2 >95% on 2 L. On supplemental O2 due to history of COPD, Cpap while asleep, inability to use Cpap while under COVID precautions. Will update as needed. Problem: Body Temperature -  Risk of, Imbalanced  Goal: Ability to maintain a body temperature within defined limits  11/11/2020 0039 by Yasmeen Quiroz RN  Outcome: Ongoing  Note: Afebrile at this time, tends to run low, 95.3, Denies chills or discomfort. SB on tele. Sweats, states that is baseline for her.

## 2020-11-11 NOTE — PROGRESS NOTES
Discharge teaching and instructions for diagnosis/procedure of covid 19 completed with patient using teachback method. AVS reviewed. prescriptions sent to [facility] pharmacy and reviewed with patient. Patient voiced understanding regarding prescriptions, follow up appointments, and care of self at home.  Discharged ambulatory and in a wheelchair wearing a mask to  home with support per family

## 2020-11-11 NOTE — PROGRESS NOTES
Internal Medicine  PGY 1  Progress Note    Admit Date: 11/6/2020  Diet: DIET CARB CONTROL; Carb Control: 4 carb choices (60 gms)/meal; Low Potassium    CC: Dizziness, body aches    Interval history:     NAEON. O2 req stable. O2 requirement trial yesterday, while ambulating without O2 satts 85%, while ambulating with 2L satts 92%. Will need home O2. Pt seen at bedside this AM, not on any O2 and satting 92%. Pt reports she feels good, ready to go home. Denies fevers, chills, chest pain, abdominal pain, N/V.      HPI:   \"47 y.o. female with PMH of type 2 DM, HTN, HLD, & ZA p/w productive cough with clear phlegm, body aches and dizziness since last friday. Pt reports decreased appetite and fevers up to 101.1F 2 days ago as well as another one this morning. The body aches started this week and are described as being all over. Two days ago she went to a clinic to get tested for covid and the flu. Flu came back negative, and apparently the covid just got back and is positive. She was told to go to the ED if her symptoms got worse. She states that she tried to stay home but today the symptoms became unbearable so she wanted to get checked out. She believes her daugthers ex-boyfriend had covid and she was exposed to him last Friday. She endorses decreased appetite, chills, nausea, diarrhea (1 episode today), chest pressure and changes in her taste. She denies SOB, vomiting, abdominal pain.     She presented to [address] [place] [address] ED afebrile and hemodynamically stable, RR 21, satting 95% on room air. EKG revealed NSR and was similar to prior in 2017. CXR revealed faint airspace opacity of the R lung base. Labs were significant for absolute lymphocytes of 0.9, Na 128, Cl 93, glucose 272, Cr 1.9 (baseline Cr 0.7-0.9). UA dirty with 11-20 ep cells, +4 bacteria. She will be transferred to [facility] for further evaluation and treatment of her covid vs CAP pneumonia and MADDIE. \"    Medications:     Scheduled Meds:   insulin lispro  20 Units Subcutaneous TID     spironolactone  25 mg Oral Daily    insulin glargine  45 Units Subcutaneous Nightly    valsartan-hydroCHLOROthiazide  1 tablet Oral Daily    dexamethasone  6 mg Oral Daily    insulin lispro  0-18 Units Subcutaneous TID     insulin lispro  0-9 Units Subcutaneous Nightly    lactated ringers bolus  2,000 mL Intravenous Once    influenza virus vaccine  0.5 mL Intramuscular Prior to discharge    sodium chloride flush  10 mL Intravenous 2 times per day    amLODIPine  10 mg Oral Daily    atorvastatin  40 mg Oral Daily    atenolol  50 mg Oral Daily    enoxaparin  40 mg Subcutaneous BID     Continuous Infusions:   dextrose       PRN Meds:simethicone, sodium chloride flush, acetaminophen **OR** acetaminophen, polyethylene glycol, promethazine **OR** ondansetron, guaiFENesin-dextromethorphan, glucose, dextrose, glucagon (rDNA), dextrose, labetalol    Objective:   Vitals:   T-max:  Patient Vitals for the past 8 hrs:   BP Temp Temp src Pulse Resp SpO2 Weight   11/11/20 0426 135/81 99.1 °F (37.3 °C) Oral 50 20 95 % 253 lb 12 oz (115.1 kg)   11/11/20 0200 -- -- -- (!) 45 -- -- --   11/10/20 2348 124/85 95.3 °F (35.2 °C) Oral (!) 46 18 97 % --   11/10/20 2200 -- -- -- (!) 49 -- -- --       Intake/Output Summary (Last 24 hours) at 11/11/2020 0547  Last data filed at 11/11/2020 0426  Gross per 24 hour   Intake 1160 ml   Output 2325 ml   Net -1165 ml       Review of Systems  See above    Physical Exam  General appearance:  No apparent distress, appears stated age and cooperative. HEENT:  Normal cephalic,atraumatic without obvious deformity. Pupils equal, round, and reactive to light. Extra ocular muscles intact. Conjunctivae/corneas clear. Neck: Supple, with full range of motion. No jugular venous distention. Trachea midline.    Respiratory:  Normal respiratory effort, no wheezes, rhonchi, rales  Cardiovascular:  Regular rate and rhythm with normal S1/S2 without murmurs, rubs or gallops. Abdomen: Soft, obese, non-tender, non-distended with normal bowel sounds. Musculoskeletal:  No clubbing, cyanosis. Trace pitting edema bilaterally. Full range of motion without deformity. Skin: Skin color, texture, turgor normal.  Norashes or lesions. Neurologic:  Neurovascularly intact without any focal sensory/motor deficits. Cranialnerves: II-XII intact, grossly non-focal.  Psychiatric:  Alert and oriented, thought content appropriate,normal insight  Capillary Refill: Brisk,< 3 seconds   Peripheral Pulses: +2 palpable, equal bilaterally     LABS:    CBC:   Recent Labs     11/09/20  0511 11/10/20  0518   WBC 5.6 7.5   HGB 11.0* 11.2*   HCT 32.1* 32.8*    283   MCV 80.2 80.1     Renal:    Recent Labs     11/09/20  0511 11/10/20  0518   * 134*   K 4.1 4.1   CL 99 97*   CO2 24 24   BUN 15 19   CREATININE 0.8 0.9   GLUCOSE 292* 266*   CALCIUM 9.1 9.2   MG 2.40 2.30   PHOS 2.4* 3.1   ANIONGAP 11 13     Hepatic:   Recent Labs     11/09/20  0511 11/10/20  0518   AST 29 23   ALT 41* 36   BILITOT 0.3 0.3   BILIDIR <0.2 <0.2   PROT 8.1 8.1   LABALBU 4.0 4.1   ALKPHOS 58 60     Troponin:   No results for input(s): TROPONINI in the last 72 hours. BNP: No results for input(s): BNP in the last 72 hours. Lipids: No results for input(s): CHOL, HDL in the last 72 hours. Invalid input(s): LDLCALCU, TRIGLYCERIDE  ABGs:  No results for input(s): PHART, ZYF1PAA, PO2ART, UPT5DQU, BEART, THGBART, G0VIGSWS, KFL5DWT in the last 72 hours. INR:   Recent Labs     11/09/20  0511 11/10/20  0518 11/11/20  0430   INR 1.13 1.05 1.09     Lactate: No results for input(s): LACTATE in the last 72 hours. Cultures:  -----------------------------------------------------------------  RAD:   XR CHEST 1 VIEW   Final Result   Impression: Faint airspace opacity of the right lung base medially. Assessment/Plan:   Covid pneumonia  Sirs + fever 100.8F, RR 24, pt does not appear to be septic.  Now requiring oxygen NC 2L. Covid +. No O2 on admission, now 2L.  - f/u viral respiratory panel  - f/u procalcitonin  - d-dimer trending down 519 -> 274 -> 221, now less than 200  - ferritin 670, CRP 62,   - fibrinogen trending down 534 -> 486  - PT 14.2 -> 13.6, INR 1.22 -> 1.17  - Continue decadron 6 mg PO daily, day #5, will send home today with remaining 5d regime  - repeat O2 ambulatory assessment today before discharge to assess if pt needs to go home on O2  - lovenox 40 mg BID per pharmacy  - if O2 reached >=4L, will start remdesivir     MADDIE - resolved  Today 1.1. Baseline Cr 0.7-0.9. Cr 1.9 on admission. Likely 2/2 dehydration due to poor PO intake. - monitor UO & renal function     Chronic Diastolic heart failure  No evidence of exacerbation. Echo 6/17 revealed EF 60-65% and grade I diastolic dysfunction  - 1X IV lasix    Hyponatremia - improving  Likely 2/2 dehydration. Serum osm calc 286. - Na 134  - monitor Na     DM type 2  A1c 3/2020 11.8. On home metformin, glimeperide, & lantus 10U. Glucose not in control.  292 this AM.  - hold home metformin &glimeperide  - lantus 45U  - lispro 20U TID  - not controlled 2/2 hyperglycemia from steroids, will send her home with instructions to resume metformin & glimeperide, add 50U lantus nightly, humalog 20U TID with meals, and HDSSI (with instructions to check her BG 2h after eating)  - increase MDSSI to HDSSI  - POCT glucose checks, hypoglycemia protocol  - HA1C 11.3      Secondary HTN  On 5 BP meds, litzy/renin > 30, primary hyperalderonism  - Cont home valsartan-HCTZ  - continue home amlodipine, atenolol (hold if SBP < 110 or HR < 65)  - cont spironolactone  - low K diet     HLD  - atorvastatin     ZA  - on home CPAP  - supplemental O2 at night    Code Status: Full Code  FEN: DIET CARB CONTROL; Carb Control: 4 carb choices (60 gms)/meal; Low Potassium  PPX: Lovenox 40BID  DISPO: Discharge today    Rula Cedeno DO, PGY-1  11/11/20  5:47 AM    This patient has been staffed and discussed with Jeevan Raymond MD.

## 2020-11-11 NOTE — PROGRESS NOTES
[facility]    Respiratory Therapy     Home Oxygen Evaluation        Name: Lindsey Evans  Medical Record Number: [id number]  Age: 49 y.o. Gender:  female   : 1965  Today's date: 2020  Room: [address] [place] [address]      Assessment        /84   Pulse 67   Temp 97.3 °F (36.3 °C) (Oral)   Resp 22   Ht 5' 2\" (1.575 m) Comment: Previously documented  Wt 253 lb 12 oz (115.1 kg)   SpO2 93%   BMI 46.41 kg/m²     Patient Active Problem List   Diagnosis    Hyperlipidemia    Essential hypertension, benign    ZA (obstructive sleep apnea)    Perimenopause    Vitamin D deficiency    Left ankle pain    Type 2 diabetes mellitus with microalbuminuria (HCC)    Knee pain, chronic    Iron deficiency anemia due to chronic blood loss    Mild intermittent asthma without complication    Colon polyps    Abnormal nuclear cardiac imaging test    Elevated serum protein level    Allergic rhinitis    Diabetes mellitus type 2, uncontrolled, with complications (HCC)    Essential hypertension    Alopecia    Central centrifugal scarring alopecia    Pneumonia    COVID-19    MADDIE [facility]acute kidney [facility]) ([facility] [address]    Hyponatremia       Social History:  Social History     Tobacco Use    Smoking status: Never Smoker    Smokeless tobacco: Never Used    Tobacco comment: passive smoke exposure    Substance Use Topics    Alcohol use:  Yes     Alcohol/week: 2.0 standard drinks     Types: 2 Glasses of wine per week     Frequency: 2-4 times a month     Drinks per session: 1 or 2     Binge frequency: Never     Comment: occasionally     Drug use: No       Patient Room Air saturation at rest 94  %  Patient Room Air saturation upon ambulation 87 %    Oxygen saturations of 88% or less on RA qualifies patient for Home Oxygen    Patient resting on 0  lmp  with an oxygen saturation of  94 %     Patient ambulated on 0 lpm with an oxygen saturation of 87%    Patient ambulated on 1 lpm with an oxygen saturation of

## 2020-11-11 NOTE — FLOWSHEET NOTE
11/11/20 1642   Encounter Summary   Services provided to: Patient   Referral/Consult From:   (Part of advance care planning. )   Support System Family members   Continue Visiting   (Telephonic visit, 11/11/2020, ADRIANNE. )   Complexity of Encounter Moderate   Length of Encounter 15 minutes   Routine   Type Follow up   Advance Directives (For Healthcare)   Healthcare Directive Yes, patient has an advance directive for healthcare treatment   Type of Healthcare Directive Health care treatment directive  ([address]   Copy in Chart Yes, copy in [address] Agent's Name   Maame Jacob. )   Healthcare Agent's Phone Number   ([phone]. )   If you are unable to speak for yourself, does your Healthcare Agent or Legal Spokesperson know your healthcare wishes?  Yes

## 2020-11-11 NOTE — CARE COORDINATION
Case Management Daily Note                    Date: 11/11/2020     Patient Name: Chico Yost    Date of Admission: 11/6/2020  7:35 AM  YOB: 1965    Length of Stay: 5         Patient Admission Status: Inpatient  Diagnosis:Pneumonia [J18.9]     ________________________________________________________________________________________  Discharge Plan: Home possible home 02   Has [employer] for C-Pap at home. Insurance: Payor: [employer] / Plan: BCBS - [place] PPO / Product Type: *No Product type* /   Is pre-cert/notification needed: YEs    Tentative discharge date: 11/11    Current barriers: Home 02 set up     Referrals completed: Not Applicable    Resources/ information provided: Not indicated at this time   ________________________________________________________________________________________  PT AM-PAC:   / 24 per last evaluation on: N/A    OT AM-PAC:   / 24 per last evaluation on: N/A     DME Needs for discharge: defer  ________________________________________________________________________________________  Notes/Plan of Care:   Patient remains in COVID-19 precautions precluding routine rounding/contact. All efforts made to respect recommendations of social distancing and decrease PPE consumption. MARGUERITE spoke with Dr. Grant Kruse this AM. He reported patent to discharge home today. He stated patient is now on room air and he does not believe she will need home 02. MARGUERITE spoke with Bedside RN. Home 02 eval to be completed again today regarding need. IF 02 needed will utilize [employer] for home 02 as c-pap is with them. UPDATE: 12:54 pm. MARGUERITE was informed patient in need of home 02 at discharge at 1 LPM continuous. As patient is set up with [facility] call to set up. ([phone]). MARGUERITE to fax clinicals [phone]. They will then deliver tank once approved. UPDATE: 2:04 pm. [employer] did not accept the order they received.  They are faxing their order form and are requesting the doctor complete and sign the form. And fax back. Aracelis Saab and/or her family were provided with choice of provider; she and/or her family are in agreement with the discharge plan at this time.     Care Transition Patient: BOGDAN Regan  [facility]  Case Management Department  Ph: [phone]

## 2020-11-11 NOTE — PROGRESS NOTES
Offered to call and update pt's family, pt states \"I already talked to all of them,\" declines RN to update at this time. Will update as needed.

## 2020-11-12 ENCOUNTER — CARE COORDINATION (OUTPATIENT)
Dept: CASE MANAGEMENT | Age: 55
End: 2020-11-12

## 2020-11-13 ENCOUNTER — CARE COORDINATION (OUTPATIENT)
Dept: CASE MANAGEMENT | Age: 55
End: 2020-11-13

## 2020-11-16 ENCOUNTER — VIRTUAL VISIT (OUTPATIENT)
Dept: INTERNAL MEDICINE CLINIC | Age: 55
End: 2020-11-16
Payer: COMMERCIAL

## 2020-11-16 PROCEDURE — 1111F DSCHRG MED/CURRENT MED MERGE: CPT | Performed by: INTERNAL MEDICINE

## 2020-11-16 PROCEDURE — 99496 TRANSJ CARE MGMT HIGH F2F 7D: CPT | Performed by: INTERNAL MEDICINE

## 2020-11-16 NOTE — PROGRESS NOTES
Post-Discharge Transitional Care Management Services or Hospital Follow Up      TELEHEALTH EVALUATION -- Audio/Visual (During VCDWH-17 public health emergency)    Yue Seay is a 49 y.o. female being evaluated by a Virtual Visit (video visit) encounter to address concerns as mentioned above. A caregiver was present when appropriate. Due to this being a TeleHealth encounter (During VXUFW-69 public health emergency), evaluation of the following organ systems was limited: Vitals/Constitutional/EENT/Resp/CV/GI//MS/Neuro/Skin/Heme-Lymph-Imm. Pursuant to the emergency declaration under the [facility], [address] [place] [address] authority and the [employer] Act, this Virtual Visit was conducted with patient's (and/or legal guardian's) consent, to reduce the patient's risk of exposure to COVID-19 and provide necessary medical care. The patient (and/or legal guardian) has also been advised to contact this office for worsening conditions or problems, and seek emergency medical treatment and/or call 911 if deemed necessary. Patient identification was verified at the start of the visit: {YES    Services were provided through a video synchronous discussion virtually to substitute for in-person clinic visit. Patient and provider were located at their individual homes. Yue Seay   YOB: 1965    Date of Office Visit:  11/16/2020  Date of Hospital Admission: 11/6/20  Date of Hospital Discharge: 11/11/20  Readmission Risk Score(high >=14%.  Medium >=10%):Readmission Risk Score: 20    Care management risk score Rising risk (score 2-5) and Complex Care (Scores >=6): 6     Non face to face  following discharge, date last encounter closed (first attempt may have been earlier): *No documented post hospital discharge outreach found in the last 14 days *No documented post hospital discharge outreach found in the last 14 days    Call initiated 2 business days of discharge: *No response recorded in the last 14 days     Patient Active Problem List   Diagnosis    Hyperlipidemia    Essential hypertension, benign    ZA (obstructive sleep apnea)    Perimenopause    Vitamin D deficiency    Left ankle pain    Type 2 diabetes mellitus with microalbuminuria (HCC)    Knee pain, chronic    Iron deficiency anemia due to chronic blood loss    Mild intermittent asthma without complication    Colon polyps    Abnormal nuclear cardiac imaging test    Elevated serum protein level    Allergic rhinitis    Diabetes mellitus type 2, uncontrolled, with complications ([facility] [address]    Essential hypertension    Alopecia    Central centrifugal scarring alopecia    Pneumonia    COVID-19    MADDIE (acute kidney injury) ([facility] [address]    Hyponatremia       Allergies   Allergen Reactions    Prednisone Other (See Comments)     Made her feel loopy       Medications listed as ordered at the time of discharge from hospital [facility] [place] Home Medication Instructions KIMMIE:    Printed on:11/23/20 7758   Medication Information                      amLODIPine (NORVASC) 10 MG tablet  TAKE ONE TABLET BY MOUTH DAILY             apixaban (ELIQUIS) 2.5 MG TABS tablet  Take 1 tablet by mouth 2 times daily for 25 days             atenolol (TENORMIN) 50 MG tablet  Take 0.5 tablets by mouth daily             atorvastatin (LIPITOR) 40 MG tablet  Take 1 tablet by mouth daily Replaces Simvastatin (due to drug interaction with Amlodipine)             Blood Glucose Monitoring Suppl (ACURA BLOOD GLUCOSE METER) w/Device KIT  Inject 1 Applicatorful into the skin 2 times daily (before meals)             fluticasone (FLONASE) 50 MCG/ACT nasal spray  1 spray by Each Nostril route daily as needed for Rhinitis             glimepiride (AMARYL) 4 MG tablet  TAKE ONE AND ONE-HALF TABLET BY MOUTH EVERY MORNING BEFORE BREAKFAST             insulin glargine (LANTUS SOLOSTAR) 100 UNIT/ML (DIOVAN-HCT) 320-25 MG per tablet TAKE ONE TABLET BY MOUTH DAILY 90 tablet 1    atorvastatin (LIPITOR) 40 MG tablet Take 1 tablet by mouth daily Replaces Simvastatin (due to drug interaction with Amlodipine) 90 tablet 1    metFORMIN (GLUCOPHAGE-XR) 500 MG extended release tablet TAKE FOUR TABLETS BY MOUTH DAILY WITH BREAKFAST 120 tablet 4    amLODIPine (NORVASC) 10 MG tablet TAKE ONE TABLET BY MOUTH DAILY 30 tablet 4        Medications patient taking as of now reconciled against medications ordered at time of hospital discharge:yes     complaint: HFU    HPI  Patient is now 10 days out from her admission for Covid 19 pneumonia with acute hypoxic respiratory failure. Symptoms began 1 week prior to admission. She reports that she feels she is improving. She continues to require home oxygen but only at night. She has continued to isolate at home but her  is in the same house -they are keeping distance. Inpatient course: Discharge summary reviewed- see chart. Review of Systems   Constitutional: Positive for fatigue. Negative for chills and fever. HENT: Negative for congestion and sore throat. Respiratory: Positive for shortness of breath. Cardiovascular: Negative for chest pain. Psychiatric/Behavioral: Negative for dysphoric mood. There were no vitals filed for this visit. There is no height or weight on file to calculate BMI. Wt Readings from Last 3 Encounters:   11/11/20 253 lb 12 oz (115.1 kg)   08/27/20 264 lb (119.7 kg)   06/11/20 265 lb 9.6 oz (120.5 kg)     BP Readings from Last 3 Encounters:   11/11/20 124/84   08/27/20 137/86   06/11/20 (!) 140/84       Physical Exam   Patient does not appear dyspneic or otherwise in distress. She is laughing and smiling. Patient-Reported Vitals 7/6/2020   Patient-Reported Weight 263lb          Assessment/Plan:  1. COVID-19  Advised nasal saline for nasal discomfort related to oxygen she is using overnight.   Advised her to check

## 2020-11-16 NOTE — PATIENT INSTRUCTIONS
Nasal saline or vaseline at tip of nose, humidifier    Mammogram  [phone]    Check pulse ox every day and if consistently over 95% on room air then can return oxygen to company    Call if bad smells persist and may try gabapentin

## 2020-11-17 ENCOUNTER — CARE COORDINATION (OUTPATIENT)
Dept: CASE MANAGEMENT | Age: 55
End: 2020-11-17

## 2020-11-19 ENCOUNTER — TELEPHONE (OUTPATIENT)
Dept: INTERNAL MEDICINE CLINIC | Age: 55
End: 2020-11-19

## 2020-11-19 NOTE — TELEPHONE ENCOUNTER
Patient states that yesterday she was experiencing dizziness, SOB, and just didn't feel right. Today she is still feeling light headed and has a feeling of just not feeling right. Please contact patient to advise further.

## 2020-11-20 NOTE — TELEPHONE ENCOUNTER
Spoke with patient and she is feeling better today. No shortness of breath but has oxygen to use if needed. Her job is calling and wanting to know when she will return to work but she doesn't feel like she is ready to go back yet.

## 2020-11-23 ASSESSMENT — ENCOUNTER SYMPTOMS
SORE THROAT: 0
SHORTNESS OF BREATH: 1

## 2020-11-24 ENCOUNTER — TELEPHONE (OUTPATIENT)
Dept: INTERNAL MEDICINE CLINIC | Age: 55
End: 2020-11-24

## 2020-11-24 ENCOUNTER — CARE COORDINATION (OUTPATIENT)
Dept: CASE MANAGEMENT | Age: 55
End: 2020-11-24

## 2020-12-01 ENCOUNTER — TELEPHONE (OUTPATIENT)
Dept: INTERNAL MEDICINE CLINIC | Age: 55
End: 2020-12-01

## 2020-12-01 ENCOUNTER — CARE COORDINATION (OUTPATIENT)
Dept: CASE MANAGEMENT | Age: 55
End: 2020-12-01

## 2020-12-01 NOTE — TELEPHONE ENCOUNTER
Patient states she dropped off [address] [facility] [address] yesterday, and called to check the status. Please contact patient @ phone # provided once paperwork is completed.

## 2020-12-03 ENCOUNTER — CARE COORDINATION (OUTPATIENT)
Dept: CASE MANAGEMENT | Age: 55
End: 2020-12-03

## 2020-12-03 NOTE — TELEPHONE ENCOUNTER
Patient called back 12/3 at 8:55 to check and see if we have the paperwork and what is current status. Please call at [phone] to let her know. She knows we are having outgoing phone issues so may come in from a different number other than [facility].

## 2020-12-11 ENCOUNTER — CARE COORDINATION (OUTPATIENT)
Dept: CASE MANAGEMENT | Age: 55
End: 2020-12-11

## 2020-12-11 NOTE — CARE COORDINATION
[facility] Transitions Follow Up Call    2020    Patient: Ana Benito  Patient : 1965   MRN: [id number]   Reason for Admission:  Pneumonia - covid 19 monitoring   Discharge Date: 20 RARS: Readmission Risk Score: 21         Spoke with: [address] Transitions Subsequent and Final Call    Subsequent and Final Calls  Do you have any ongoing symptoms?:  Yes  Patient-reported symptoms:  Cough  Interventions for patient-reported symptoms:  Other  Have your medications changed?:  No  Do you have any questions related to your medications?:  No  Do you currently have any active services?:  No  Do you have any needs or concerns that I can assist you with?:  No  Identified Barriers:  None  Care Transitions Interventions  Other Interventions:          Summary  CTN spoke with the Pt who reports SOB has subsided but productive cough continues. Pt states she has been avoiding dairy products and drinking hot tea as recommended by CTN on previous call. Pt states cough is still productive with some phlegm but improved. Pt thanked CTN for suggestion about drinking hot tea. CTN encouraged Pt to continue with hot tea as needed. From CDC: Are you at higher risk for severe illness?  Wash your hands often.  Avoid close contact (6 feet, which is about two arm lengths) with people who are sick.  Put distance between yourself and other people if COVID-19 is spreading in your community.  Clean and disinfect frequently touched surfaces.  Avoid all cruise travel and non-essential air travel.  Call your healthcare professional if you have concerns about COVID-19 and your underlying condition or if you are sick. Pt will take all meds as prescribed and schedule / keep doctors appt. CTC provided education on s/s that require medical attention and when to seek medical attention.   [facility] advised Pt of use urgent care or physicians 24 hr access line if assistance is needed after hours or on the weekend. Pt denies any needs or concerns and is agreeable with additional calls.     Follow Up  Future Appointments   Date Time Provider Thu Yanez   2/11/2021  3:00 PM Dewight Severin, MD [facility]   2/19/2021 12:00 PM Darwin Colbert MD [facility] DANIELLA Wilson RN

## 2020-12-11 NOTE — CARE COORDINATION
[facility] Transitions Follow Up Call    2020    Patient: Adina Single  Patient : 1965   MRN: [id number]   Reason for Admission:  Pneumonia - covid 19 monitoring   Discharge Date: 20 RARS: Readmission Risk Score: 21         Spoke with: [address] Transitions Subsequent and Final Call    Subsequent and Final Calls  Do you have any ongoing symptoms?:  Yes  Onset of Patient-reported symptoms:  Other  Patient-reported symptoms:  Shortness of Breath, Cough  Interventions for patient-reported symptoms:  Other  Have your medications changed?:  Yes  Patient Reports:  completed steroids   Do you have any questions related to your medications?:  No  Do you currently have any active services?:  No  Do you have any needs or concerns that I can assist you with?:  No  Identified Barriers:  None  Care Transitions Interventions  Other Interventions:          Summary  CTN spoke to the Pt who reports SOB with exertion that improves with rest and cough with thick clear mucous. CTN encouraged Pt to avoid dairy products which can thick sputum and make more difficult to cough up. Pt has completed steroids. From CDC: Are you at higher risk for severe illness?  Wash your hands often.  Avoid close contact (6 feet, which is about two arm lengths) with people who are sick.  Put distance between yourself and other people if COVID-19 is spreading in your community.  Clean and disinfect frequently touched surfaces.  Avoid all cruise travel and non-essential air travel.  Call your healthcare professional if you have concerns about COVID-19 and your underlying condition or if you are sick. Pt will take all meds as prescribed and schedule / keep doctors appt. [facility] provided education on s/s that require medical attention and when to seek medical attention. [facility] advised Pt of use urgent care or physicians 24 hr access line if assistance is needed after hours or on the weekend.   Pt denies any needs or concerns and is agreeable with additional calls.     Follow Up  Future Appointments   Date Time Provider Thu Yanez   2/11/2021  3:00 PM Rj Mckenzie MD [facility]   2/19/2021 12:00 PM Isaura Lima MD [facility]       Alex Ann RN

## 2020-12-11 NOTE — CARE COORDINATION
[facility] Transitions Initial Follow Up Call    2020    Patient:  Derrick Moreno Patient :  1965  MRN:  [id number]   Reason for Admission:  covid 19 monitoring - pneumonia   Discharge Date:  20  RARS: 20      CTC attempt to reach Pt regarding recent hospital discharge. CTC left voice recording with call back number requesting a call back. Follow up appointments:    Future Appointments   Date Time Provider Thu Gambinoi   2021  3:00 PM Dionisio Hagen MD [facility] Neph[facility]   2021 12:00 PM Abbie Aisha Spatz, MD [facility]       YEYO SyedN, RN  Care Transition Coordinator  Contact Number:  [phone]

## 2020-12-11 NOTE — CARE COORDINATION
[facility] Transitions Initial Follow Up Call    20    Patient:  Aldair Méndez Patient :  1965  MRN:  [id number]   Reason for Admission:  Pneumonia - covid 19 monitoring   Discharge Date:  20  RARS: 20      CTC attempt to reach Pt regarding recent hospital discharge. CTC left voice recording with call back number requesting a call back. Follow up appointments:    Future Appointments   Date Time Provider Thu Yanez   2021  3:00 PM Yun Diaz MD [facility] Neph[facility]   2021 12:00 PM Julieta Partida MD [facility]       TEREZA Linda, RN  Care Transition Coordinator  Contact Number:  [phone]

## 2020-12-11 NOTE — CARE COORDINATION
[facility] Transitions Initial Follow Up Call  20    Patient:  Ulises Wilkins Patient :  1965  MRN:  [id number]   Reason for Admission:  Pneumonia / covid 19 monitoring final call   Discharge Date:  20  RARS:  20    CTC attempt to reach Pt regarding recent hospital discharge. CTC left voice recording with call back number requesting a call back. Follow up appointments:    Future Appointments   Date Time Provider Thu Yanez   2021  3:00 PM Rusty Montes MD [facility] Nephrol[facility]   2021 12:00 PM Julieta Vela MD [facility] TEREZA Roberts, RN  Care Transition Coordinator  Contact Number:  [phone]

## 2020-12-16 NOTE — TELEPHONE ENCOUNTER
Please clarify how she is actually taking the Lispro insulin. I believe this was started while she was hospitalized.

## 2020-12-18 RX ORDER — INSULIN LISPRO 100 [IU]/ML
INJECTION, SOLUTION INTRAVENOUS; SUBCUTANEOUS
Qty: 30 ML | Refills: 0 | Status: SHIPPED | OUTPATIENT
Start: 2020-12-18 | End: 2021-01-29 | Stop reason: SDUPTHER

## 2020-12-18 NOTE — TELEPHONE ENCOUNTER
Patient advised. She was given nam e and phone number for Dr. Whit Toribio and will call to schedule appointment.

## 2020-12-18 NOTE — TELEPHONE ENCOUNTER
Her last A1c was very elevated. Suspect her DM continues to be poorly controlled. Will have her continue the Lispro as directed and should follow up with Endocrine for her Diabetes. See consult.

## 2020-12-18 NOTE — TELEPHONE ENCOUNTER
Spoke with patient who advises she was told to finish the Lispro, then discontinue once completed per hospital discharge instructions.

## 2021-01-15 ENCOUNTER — HOSPITAL ENCOUNTER (OUTPATIENT)
Dept: MAMMOGRAPHY | Age: 56
Discharge: HOME | End: 2021-01-15
Payer: COMMERCIAL

## 2021-01-15 DIAGNOSIS — Z12.31 ENCOUNTER FOR SCREENING MAMMOGRAM FOR BREAST CANCER: ICD-10-CM

## 2021-01-15 PROCEDURE — 77067 SCR MAMMO BI INCL CAD: CPT

## 2021-01-29 ENCOUNTER — VIRTUAL VISIT (OUTPATIENT)
Dept: ENDOCRINOLOGY | Age: 56
End: 2021-01-29
Payer: COMMERCIAL

## 2021-01-29 DIAGNOSIS — E78.49 OTHER HYPERLIPIDEMIA: ICD-10-CM

## 2021-01-29 DIAGNOSIS — R80.9 MICROALBUMINURIA: ICD-10-CM

## 2021-01-29 DIAGNOSIS — E66.01 CLASS 3 SEVERE OBESITY WITH BODY MASS INDEX (BMI) OF 45.0 TO 49.9 IN ADULT, UNSPECIFIED OBESITY TYPE, UNSPECIFIED WHETHER SERIOUS COMORBIDITY PRESENT (HCC): ICD-10-CM

## 2021-01-29 DIAGNOSIS — I10 ESSENTIAL HYPERTENSION: ICD-10-CM

## 2021-01-29 PROCEDURE — 99244 OFF/OP CNSLTJ NEW/EST MOD 40: CPT | Performed by: INTERNAL MEDICINE

## 2021-01-29 RX ORDER — DULAGLUTIDE 1.5 MG/.5ML
1.5 INJECTION, SOLUTION SUBCUTANEOUS WEEKLY
Qty: 4 PEN | Refills: 3 | Status: SHIPPED | OUTPATIENT
Start: 2021-01-29 | End: 2021-06-21

## 2021-01-29 RX ORDER — INSULIN GLARGINE 100 [IU]/ML
24 INJECTION, SOLUTION SUBCUTANEOUS NIGHTLY
Qty: 5 PEN | Refills: 3 | Status: SHIPPED | OUTPATIENT
Start: 2021-01-29 | End: 2021-02-21

## 2021-01-29 RX ORDER — INSULIN LISPRO 100 [IU]/ML
INJECTION, SOLUTION INTRAVENOUS; SUBCUTANEOUS
Qty: 30 ML | Refills: 0 | Status: SHIPPED | OUTPATIENT
Start: 2021-01-29 | End: 2021-02-21

## 2021-01-29 RX ORDER — INSULIN LISPRO 100 [IU]/ML
INJECTION, SOLUTION INTRAVENOUS; SUBCUTANEOUS
Qty: 30 ML | Refills: 0 | Status: SHIPPED | OUTPATIENT
Start: 2021-01-29 | End: 2021-01-29 | Stop reason: SDUPTHER

## 2021-01-29 NOTE — PROGRESS NOTES
Pursuant to the emergency declaration under the [facility] waiver authority and the [employer] Act, this Virtual  Visit was conducted, with patient's consent, to reduce the patient's risk of exposure to COVID-19 and provide continuity of care for an established patient. Patient was at home. Provider was at home. No one else was involved  Services were provided through a video synchronous discussion virtually to substitute for in-person clinic visit. Seen as new patient for diabetes    Diagnosed with Type 2 diabetes mellitus > 10year  Known diabetic complications: none   Uncontrolled, moderate    Current diabetic medications     Amaryl 6mg  lantus 50 units  But taking 10 units  Lispro 20 units AC TID  SSI 2 for 50 > 150  Does not use SSI  Metformin ER 500mg 4 tab with breakfast    H/o januvia use    Last A1c  11.3 % <---- 11.8 % <--- 7.1    Prior visit with dietician: Yes   Current diet: on average, 2 meals per day breakfast and dinner  Current exercise: walking   Current monitoring regimen: home blood tests -  times daily     Has brought blood glucose log/meter: No   Home blood sugar records: 209-369  Any episodes of hypoglycemia?    Worsened by high CHO    No Hx of CAD , PVD, CVA    Hyperlipidemia:   For   Years  Takes lipitor 40mg  Uncontrolled   on 3/20    Hypertension for years  Sees Nephrology  Stable  Takes  norvasc 10mg atenolol 25mg aldactone 100mg    Last eye exam:  1/21  Last foot exam:   Lab showed microalbuminuria  Last microalbumin to creatinine ratio:  55.3 on 12.20    Aldosterone 13  Renin 0.3  Dx by hyperaldosteronism by Nephrology due to high rato  No saline suppression test    Past Medical History:   Diagnosis Date    Allergic rhinitis     Alopecia     Diabetes mellitus type 2, uncontrolled, with complications ([facility] [address] 77/34/6761    TYPE 2    Essential hypertension     Fibroids, intramural 2011    History of uterine fibroid     Hyperlipidemia     [facility]structive [facility]) 2011    [facility]     Vitamin D deficiency 2012     Past Surgical History:   Procedure Laterality Date     SECTION      x3    COLONOSCOPY  2017    fu 5 years    TUBAL LIGATION      BTL     Current Outpatient Medications   Medication Sig Dispense Refill    insulin lispro, 1 Unit Dial, 100 UNIT/ML SOPN 20U insulin before meals 3x/day    Take insulin according to glucose check 2 hours after meals:   0  140-199 2  200-249 4  250-299 6  300-349 8  350-400 10  >400 12    Take insulin according to glucose check before sleep:   0  140-199 1  200-249 2  250-299 3  300-349 4  350-400 5  >400 6 30 mL 0    spironolactone (ALDACTONE) 100 MG tablet Take 1 tablet by mouth daily 30 tablet 5    atenolol (TENORMIN) 50 MG tablet Take 0.5 tablets by mouth daily 30 tablet 5    insulin glargine (LANTUS SOLOSTAR) 100 UNIT/ML injection pen Inject 50 Units into the skin nightly (Patient taking differently: Inject 10 Units into the skin nightly ) 5 pen 3    glimepiride (AMARYL) 4 MG tablet TAKE ONE AND ONE-HALF TABLET BY MOUTH EVERY MORNING BEFORE BREAKFAST 45 tablet 4    Insulin Pen Needle 29G X 12.7MM MISC 1 each by Does not apply route daily 100 each 3    atorvastatin (LIPITOR) 40 MG tablet Take 1 tablet by mouth daily Replaces Simvastatin (due to drug interaction with Amlodipine) 90 tablet 1    metFORMIN (GLUCOPHAGE-XR) 500 MG extended release tablet TAKE FOUR TABLETS BY MOUTH DAILY WITH BREAKFAST 120 tablet 4    amLODIPine (NORVASC) 10 MG tablet TAKE ONE TABLET BY MOUTH DAILY 30 tablet 4    fluticasone (FLONASE) 50 MCG/ACT nasal spray 1 spray by Each Nostril route daily as needed for Rhinitis      OneTouch Delica Lancets 56I MISC Test 3 times daily E11.9 (Patient not taking: Reported on 2021) 100 each 10    ONE TOUCH ULTRA TEST strip USE TO TEST THREE TIMES A DAY (Patient not taking: Reported on 1/29/2021) 50 strip 2    Blood Glucose Monitoring Suppl (ACURA BLOOD GLUCOSE METER) w/Device KIT Inject 1 Applicatorful into the skin 2 times daily (before meals) (Patient not taking: Reported on 1/29/2021) 1 kit 0     No current facility-administered medications for this visit. Review of Systems  Constitutional: Negative for weight loss and malaise/fatigue. Negative for fever and chills. HENT: Negative for hearing loss, ear pain, nosebleeds, neck pain and tinnitus. Eyes: Negative for blurred vision. Negative for double vision, photophobia and pain. Respiratory: Negative for cough and sputum production. Cardiovascular: Negative for chest pain, palpitations and leg swelling. Gastrointestinal: Negative for nausea, vomiting and abdominal pain. Genitourinary: Negative for dysuria, urgency and frequency. Musculoskeletal: Negative for back pain. No joint pain  Skin: Negative for itching and rash. Neurological: Negative for dizziness. Negative for tingling, tremors, focal weakness and headaches. +numbness in arm  Endo/Heme/Allergies: see HPI  Psychiatric/Behavioral: Negative for depression and substance abuse. SH: No smoking  Works on [employer]    Belen Zuri: Mother DM    PHYSICAL EXAMINATION:  [ INSTRUCTIONS:  \"[x]\" Indicates a positive item  \"[]\" Indicates a negative item  -- DELETE ALL ITEMS NOT EXAMINED]  Vital Signs: (As obtained by patient/caregiver or practitioner observation)  259  Blood pressure-  Heart rate-    Respiratory rate- 14   Temperature-  Pulse oximetry-     Constitutional Appears well-developed and well-nourished No apparent distress        Mental status  Alert and awake  Oriented to person/place/time  Able to follow commands      Eyes:  EOM    [x]  Normal    Sclera  [x]  Normal           Discharge [x]  None visible      HENT:   [x] Normocephalic, atraumatic.     [x] Mouth/Throat: Mucous membranes are moist.     External Ears [x] Normal  no discharge Neck: [x] No visualized mass  no swelling    Pulmonary/Chest: [x] Respiratory effort normal.  [x] No visualized signs of difficulty breathing or respiratory distress             Musculoskeletal:   [x] Normal gait with no signs of ataxia         [x] Normal range of motion of neck          Head and neck stable, appears normal ROM, strength good    Neurological:        [x] No Facial Asymmetry (Cranial nerve 7 motor function) (limited exam to video visit)          [x] No gaze palsy                Skin:        [x] No significant exanthematous lesions or discoloration noted on facial skin                 Psychiatric:       [x] Normal Affect [x] No Hallucinations            Other pertinent observable physical exam findings-     Due to this being a TeleHealth encounter, evaluation of the following organ systems is limited: Vitals/Constitutional/EENT/Resp/CV/GI//MS/Neuro/Skin/Heme-Lymph-Imm. Services were provided through a video synchronous discussion virtually to substitute for in-person clinic visit. Lab Reviewed   No components found for: CHLPL  Lab Results   Component Value Date    TRIG 117 03/09/2020    TRIG 86 06/13/2017    TRIG 87 06/01/2017     Lab Results   Component Value Date    HDL 40 03/09/2020    HDL 50 05/06/2019    HDL 44 04/05/2018     Lab Results   Component Value Date    LDLCALC 125 (H) 03/09/2020    LDLCALC 86 06/13/2017    LDLCALC 126 (H) 06/01/2017     Lab Results   Component Value Date    LABVLDL 23 03/09/2020    LABVLDL 17 06/13/2017    LABVLDL 17 06/01/2017     Lab Results   Component Value Date    LABA1C 11.3 11/07/2020       Assessment:     Arnie Potts is a 48 y.o. female with :    1.T2DM: Longstanding, uncontrolled, insulin resistant. Reviewed log. Discussed goals, risk of complications. She is taking lower dose of basal. Will adjust and advised to use lispro with each meal as doing once a day. Change SSI to before meals as not adherent with it.   Check coverage for GLP-1 agonist.  Refer to dietician  2. HTN : Blood pressure controlled per patient  3. HLD : LDL high, may need higher dose of statin  4. Microalbuminuria: Discussed importance of glucose control  5. Obesity: Recommend weight loss       Plan:      Check A1c   Lantus change to 24 units   Lispro 18 units AC TID   SSI 2 for 50 > 150 combined scale   Start trulicity   Advised to check blood sugar 4 times a day   Patient to send blood sugar log for titration. Advise to exercise regularly, Advise to low simple carbohydrate and protein with each  meal diet. Diabetes Care: recommend yearly eye exam, foot exam and urine microalbumin to   creatinine ratio. Patient is up-to-date.

## 2021-02-19 ENCOUNTER — OFFICE VISIT (OUTPATIENT)
Dept: INTERNAL MEDICINE CLINIC | Age: 56
End: 2021-02-19
Payer: COMMERCIAL

## 2021-02-19 VITALS
DIASTOLIC BLOOD PRESSURE: 84 MMHG | HEART RATE: 80 BPM | RESPIRATION RATE: 16 BRPM | BODY MASS INDEX: 45.36 KG/M2 | SYSTOLIC BLOOD PRESSURE: 118 MMHG | TEMPERATURE: 97.9 F | OXYGEN SATURATION: 98 % | WEIGHT: 248 LBS

## 2021-02-19 DIAGNOSIS — Z11.59 SCREENING FOR VIRAL DISEASE: ICD-10-CM

## 2021-02-19 DIAGNOSIS — E11.9 TYPE 2 DIABETES MELLITUS WITHOUT COMPLICATION, WITHOUT LONG-TERM CURRENT USE OF INSULIN (HCC): Primary | ICD-10-CM

## 2021-02-19 DIAGNOSIS — I15.9 SECONDARY HYPERTENSION: ICD-10-CM

## 2021-02-19 DIAGNOSIS — E78.5 HYPERLIPIDEMIA, UNSPECIFIED HYPERLIPIDEMIA TYPE: ICD-10-CM

## 2021-02-19 DIAGNOSIS — M17.11 OSTEOARTHRITIS OF RIGHT KNEE, UNSPECIFIED OSTEOARTHRITIS TYPE: ICD-10-CM

## 2021-02-19 PROCEDURE — 99214 OFFICE O/P EST MOD 30 MIN: CPT | Performed by: INTERNAL MEDICINE

## 2021-02-19 ASSESSMENT — PATIENT HEALTH QUESTIONNAIRE - PHQ9
1. LITTLE INTEREST OR PLEASURE IN DOING THINGS: 0
SUM OF ALL RESPONSES TO PHQ9 QUESTIONS 1 & 2: 0
SUM OF ALL RESPONSES TO PHQ QUESTIONS 1-9: 0
2. FEELING DOWN, DEPRESSED OR HOPELESS: 0

## 2021-02-19 NOTE — PROGRESS NOTES
Madina Helton (:  1965) is a 48 y.o. female, here for evaluation of the following chief complaint(s):    Follow-up (Right knee pain. No known trauma)      ASSESSMENT/PLAN:  1. Type 2 diabetes mellitus without complication, without long-term current use of insulin ([facility] [address]  History of poor control at least in part related to noncompliance. She is established with endocrine. Since starting on Trulicity her glucose levels have been remarkably better. Advised her to remain on Trulicity. It is unclear how much Metformin she takes perhaps due to side effects but encouraged her to take 1000 mg/day if cant tolerate 2000mg. She can stop glimepiride as she complained about hypoglycemia with lispro. Is unclear if she was taking her glargine regularly but asked her to resume at 10 units daily. If not having hypoglycemia, she should reattempt lispro at 10 units with her biggest meal per day. Asked her to let me know if her readings are generally running below 80 or above 200.  -     Diabetic Foot Exam  -     LIPID PANEL; Future  -     Vitamin B12; Future  2. Secondary hypertension  Good control on amlodipine, atenolol, spironolactone. Off Diovan as per Nephrology related to ?hyperaldosteronism. 3. Hyperlipidemia, unspecified hyperlipidemia type  Stable on statin  4. Screening for viral disease  -     HEPATITIS C ANTIBODY; Future  -     Hepatitis B Surface Antibody; Future  5. Osteoarthritis of right knee, unspecified osteoarthritis type  We reviewed her 2017 x-ray. Significant arthritis of right knee. Advised rest, ice, compress, elevate and placed referral for orthopedist.  -     Mariano Anderson Asa, MD, Orthopedic Surgery, [facility]  -     OS[facility] PT - [facility]  Return in about 4 months (around 2021).     SUBJECTIVE/OBJECTIVE:  HPI Patient is here for follow-up appointment. We discussed her diabetes control which is poor. She states she has not been taking lispro because it made her feel unwell with hypoglycemia. She states she has not been on Lantus lately because she ran out. She states Trulicity is making a very big difference and working well for her and she has not had any readings above 200 since starting. She does note some burping and gaseousness. She is happy to see that her weight is down. She states that she does not usually take a full Metformin dose because it makes her gag. She has been taking glipizide. Review of Systems   Constitutional: Positive for unexpected weight change. Respiratory: Negative for shortness of breath. Cardiovascular: Negative for chest pain. Gastrointestinal: Positive for abdominal pain (dyspepsia). Endocrine: Negative for polydipsia and polyuria. Past Medical History:   Diagnosis Date    Allergic rhinitis     Alopecia     COVID-19 11/2020    Diabetes mellitus type 2, uncontrolled, with complications ([facility] [address] 58/00/0989    TYPE 2    Essential hypertension     Fibroids, intramural 06/08/2011    History of uterine fibroid     Hyperaldosteronism ([facility] [address]     ?     Hyperlipidemia     ZA (obstructive sleep apnea) 03/01/2011    [facility] sleep eval 2/20    Osteoarthritis of right knee     Vitamin D deficiency 05/22/2012       Current Outpatient Medications   Medication Sig Dispense Refill    insulin glargine (LANTUS SOLOSTAR) 100 UNIT/ML injection pen Inject 10 Units into the skin nightly 5 pen 3    insulin lispro, 1 Unit Dial, 100 UNIT/ML SOPN 10 units with biggest meal 30 mL 0    metFORMIN (GLUCOPHAGE-XR) 500 MG extended release tablet TAKE two TABLETS BY MOUTH DAILY WITH BREAKFAST 120 tablet 4    Dulaglutide (TRULICITY) 1.5 ZR/4.9WY SOPN Inject 1.5 mg into the skin once a week 4 pen 3    spironolactone (ALDACTONE) 100 MG tablet Take 1 tablet by mouth daily 30 tablet 5  atenolol (TENORMIN) 50 MG tablet Take 0.5 tablets by mouth daily 30 tablet 5    fluticasone (FLONASE) 50 MCG/ACT nasal spray 1 spray by Each Nostril route daily as needed for Rhinitis      atorvastatin (LIPITOR) 40 MG tablet Take 1 tablet by mouth daily Replaces Simvastatin (due to drug interaction with Amlodipine) 90 tablet 1    amLODIPine (NORVASC) 10 MG tablet TAKE ONE TABLET BY MOUTH DAILY 30 tablet 4    Insulin Pen Needle 29G X 12.7MM MISC 1 each by Does not apply route daily 100 each 3    OneTouch Delica Lancets 55A MISC Test 3 times daily E11.9 (Patient not taking: Reported on 1/29/2021) 100 each 10    ONE TOUCH ULTRA TEST strip USE TO TEST THREE TIMES A DAY (Patient not taking: Reported on 1/29/2021) 50 strip 2    Blood Glucose Monitoring Suppl (ACURA BLOOD GLUCOSE METER) w/Device KIT Inject 1 Applicatorful into the skin 2 times daily (before meals) (Patient not taking: Reported on 1/29/2021) 1 kit 0     No current facility-administered medications for this visit. Physical Exam  Vitals signs reviewed. Constitutional:       Appearance: Normal appearance. HENT:      Head: Normocephalic and atraumatic. Cardiovascular:      Rate and Rhythm: Normal rate and regular rhythm. Pulmonary:      Effort: Pulmonary effort is normal.      Breath sounds: Normal breath sounds. Abdominal:      General: Abdomen is flat. Bowel sounds are normal.   Musculoskeletal:      Right lower leg: No edema. Left lower leg: No edema. Neurological:      General: No focal deficit present. Mental Status: She is alert and oriented to person, place, and time. Sensory exam of the foot is normal, tested with the monofilament. Good pulses, no lesions or ulcers, good peripheral pulses. An electronic signature was used to authenticate this note.     --Selma Lewis MD

## 2021-02-19 NOTE — PATIENT INSTRUCTIONS
Stay on Trulicity 1.5     Metformin - take 1000 mg per day    Stop Glimepiride    Lantus 10 units    Lispro 10 units with biggest meal per day    Call me if highs or lows --  is goal  --  Get labs with next endocrine or nephrology visit    RICE  Rest  Ice  Compress -neoprene pull on knee sleeve  Elevation  --  Orthopedics, consider physical therapy

## 2021-02-21 RX ORDER — INSULIN LISPRO 100 [IU]/ML
INJECTION, SOLUTION INTRAVENOUS; SUBCUTANEOUS
Qty: 30 ML | Refills: 0
Start: 2021-02-21 | End: 2021-03-30

## 2021-02-21 RX ORDER — METFORMIN HYDROCHLORIDE 500 MG/1
TABLET, EXTENDED RELEASE ORAL
Qty: 120 TABLET | Refills: 4
Start: 2021-02-21 | End: 2021-04-20

## 2021-02-21 RX ORDER — INSULIN GLARGINE 100 [IU]/ML
10 INJECTION, SOLUTION SUBCUTANEOUS NIGHTLY
Qty: 5 PEN | Refills: 3
Start: 2021-02-21 | End: 2021-03-30

## 2021-02-21 ASSESSMENT — ENCOUNTER SYMPTOMS
SHORTNESS OF BREATH: 0
ABDOMINAL PAIN: 1

## 2021-02-24 ENCOUNTER — HOSPITAL ENCOUNTER (OUTPATIENT)
Dept: PHYSICAL THERAPY | Age: 56
Setting detail: THERAPIES SERIES
Discharge: HOME | End: 2021-02-24
Payer: COMMERCIAL

## 2021-02-24 PROCEDURE — 97110 THERAPEUTIC EXERCISES: CPT | Performed by: PHYSICAL THERAPIST

## 2021-02-24 PROCEDURE — 97161 PT EVAL LOW COMPLEX 20 MIN: CPT | Performed by: PHYSICAL THERAPIST

## 2021-02-24 NOTE — FLOWSHEET NOTE
[facility]  Orthopaedics and Sports Rehabilitation, [address] [facility], [address] [place], [place]      Physical Therapy Treatment Note/ Progress Report:           Date:  2021    Patient Name:  Arnie Potts    :  1965  MRN: [id number]  Restrictions/Precautions:    Medical/Treatment Diagnosis Information:  · Diagnosis: Right knee OA (M17.11)  · Treatment Diagnosis: Right knee pain  (T15.441)  Insurance/Certification information:  PT Insurance Information: [employer]  Physician Information:  Referring Practitioner: Marion Finch  Has the plan of care been signed (Y/N):        []  Yes  [x]  No     Date of Patient follow up with Physician: 2021      Is this a Progress Report:     []  Yes  [x]  No        If Yes:  Date Range for reporting period:  Beginnin2021  Ending    Progress report will be due (10 Rx or 30 days whichever is less):        Recertification will be due (POC Duration  / 90 days whichever is less):       Visit # Insurance Allowable Auth Required   In-person 1 Not yet available [x]  Yes []  No    Telehealth   []  Yes []  No    Total          Functional Scale: LEFS 52/80 (35%)    Date assessed:  2021      Therapy Diagnosis/Practice Pattern: D      Number of Comorbidities:  []0     []1-2    [x]3+    Latex Allergy:  [x]NO      []YES  Preferred Language for Healthcare:   [x]English       []other:      Pain level:  /10     SUBJECTIVE:  See eval    OBJECTIVE: See eval  ? Observation:   ? Test measurements:      RESTRICTIONS/PRECAUTIONS: hx of DM    Exercises/Interventions:     Therapeutic Ex (17074) Sets/sec Reps Notes/CUES   HS stretch tableside 30\" 3    gastroc belt stretch 30\" 3    Quad sets 5\" 20x    SLR flexion 2 5 Short range   Clamshells 3\" 20x Cueing for hip position                     DHR 2 10 Cueing for neutral foot position Patient ed   HEP, POC, Ice, importance of quad and hip strengthening as well as posterior stretching to reduce PF compression         Manual Intervention (54461)      Massage stick to quad and IT band 5'                                   NMR re-education (76048)   CUES NEEDED                                                         Therapeutic Activity (33624)                                          HEP instruction: Access Code: [email or web address]   URL: [email or web address]   Date: 02/24/2021   Prepared by: Bess Malloy        Therapeutic Exercise and NMR EXR  [x] (60988) Provided verbal/tactile cueing for activities related to strengthening, flexibility, endurance, ROM for improvements in LE, proximal hip, and core control with self care, mobility, lifting, ambulation.  [] (65315) Provided verbal/tactile cueing for activities related to improving balance, coordination, kinesthetic sense, posture, motor skill, proprioception  to assist with LE, proximal hip, and core control in self care, mobility, lifting, ambulation and eccentric single leg control.      NMR and Therapeutic Activities:    [] (80842 or 44180) Provided verbal/tactile cueing for activities related to improving balance, coordination, kinesthetic sense, posture, motor skill, proprioception and motor activation to allow for proper function of core, proximal hip and LE with self care and ADLs  [] (00409) Gait Re-education- Provided training and instruction to the patient for proper LE, core and proximal hip recruitment and positioning and eccentric body weight control with ambulation re-education including up and down stairs     Home Exercise Program:    [x] (75471) Reviewed/Progressed HEP activities related to strengthening, flexibility, endurance, ROM of core, proximal hip and LE for functional self-care, mobility, lifting and ambulation/stair navigation [] (06882)Reviewed/Progressed HEP activities related to improving balance, coordination, kinesthetic sense, posture, motor skill, proprioception of core, proximal hip and LE for self care, mobility, lifting, and ambulation/stair navigation      Manual Treatments:  PROM / STM / Oscillations-Mobs:  G-I, II, III, IV (PA's, Inf., Post.)  [x] (14534) Provided manual therapy to mobilize LE, proximal hip and/or LS spine soft tissue/joints for the purpose of modulating pain, promoting relaxation,  increasing ROM, reducing/eliminating soft tissue swelling/inflammation/restriction, improving soft tissue extensibility and allowing for proper ROM for normal function with self care, mobility, lifting and ambulation. Modalities:  None this date   [] GAME READY (VASO)- for significant edema, swelling, pain control. Charges:  Timed Code Treatment Minutes: 24'   Total Treatment Minutes: 39'       [x] EVAL (LOW) 14548   [] EVAL (MOD) 70808  [] EVAL (HIGH) 49873   [] RE-EVAL     [x] IK(26267) x   2  [] IONTO  [] NMR (92310) x     [] VASO  [] Manual (96981) x      [] Other:  [] TA x      [] Mech Traction (49994)  [] ES(attended) (29275)      [] ES (un) (48075):       GOALS:   Patient stated goal: \"To be able to walk without pain. \"  [] Progressing: [] Met: [] Not Met: [] Adjusted    Therapist goals for Patient:   Short Term Goals: To be achieved in: 2 weeks  1. Independent in HEP and progression per patient tolerance, in order to prevent re-injury. [] Progressing: [] Met: [] Not Met: [] Adjusted  2. Patient will have a decrease in pain to facilitate improvement in movement, function, and ADLs as indicated by Functional Deficits. [] Progressing: [] Met: [] Not Met: [] Adjusted    Long Term Goals: To be achieved in: 8 weeks  1. Disability index score of 16% or less for the LEFS to assist with reaching prior level of function.    [] Progressing: [] Met: [] Not Met: [] Adjusted Note: If patient does not return for scheduled/ recommended follow up visits, this note will serve as a discharge from care along with most recent update on progress.

## 2021-02-24 NOTE — PLAN OF CARE
[facility]  Orthopaedics and Sports Rehabilitation, [address] [facility], [address] [place], [place]       Physical Therapy Certification    Dear Referring Practitioner: Garrett Darnell,    We had the pleasure of evaluating the following patient for physical therapy services at [address] [place] [address]. A summary of our findings can be found in the initial assessment below. This includes our plan of care. If you have any questions or concerns regarding these findings, please do not hesitate to contact me at the office phone number checked above.   Thank you for the referral.       Physician Signature:_______________________________Date:__________________  By signing above (or electronic signature), therapists plan is approved by physician    Patient: Peter Soriano   : 1965   MRN: [id number]  Referring Physician: Referring Practitioner: Garrett Darnell      Evaluation Date: 2021      Medical Diagnosis Information:  Diagnosis: Right knee OA (M17.11)   Treatment Diagnosis: Right knee pain  (M25.561)                                         Insurance information: PT Insurance Information: [employer]     Precautions/ Contra-indications: none    C-SSRS Triggered by Intake questionnaire (Past 2 wk assessment):   [x] No, Questionnaire did not trigger screening.   [] Yes, Patient intake triggered further evaluation      [] C-SSRS Screening completed  [] PCP notified via Plan of Care  [] Emergency services notified     Latex Allergy:  [x]NO      []YES  Preferred Language for Healthcare:   [x]English       []other: Bandages/Dressings/Incisions: N/A    Gait: (include devices/WB status) decreased stance time RLE    Orthopedic Special Tests: patellar grind -                       [x] Patient history, allergies, meds reviewed. Medical chart reviewed. See intake form. Review Of Systems (ROS):  [x]Performed Review of systems (Integumentary, CardioPulmonary, Neurological) by intake and observation. Intake form has been scanned into medical record. Patient has been instructed to contact their primary care physician regarding ROS issues if not already being addressed at this time. Co-morbidities/Complexities (which will affect course of rehabilitation):   []None           Arthritic conditions   []Rheumatoid arthritis (M05.9)  [x]Osteoarthritis (M19.91)   Cardiovascular conditions   [x]Hypertension (I10)  []Hyperlipidemia (E78.5)  []Angina pectoris (I20)  []Atherosclerosis (I70)   Musculoskeletal conditions   []Disc pathology   []Congenital spine pathologies   []Prior surgical intervention  []Osteoporosis (M81.8)  []Osteopenia (M85.8)   Endocrine conditions   []Hypothyroid (E03.9)  []Hyperthyroid Gastrointestinal conditions   []Constipation (Q07.58)   Metabolic conditions   [x]Morbid obesity (E66.01)  [x]Diabetes type 1(E10.65) or 2 (E11.65)   []Neuropathy (G60.9)     Pulmonary conditions   []Asthma (J45)  []Coughing   []COPD (J44.9)   Psychological Disorders  []Anxiety (F41.9)  []Depression (F32.9)   []Other:   []Other:          Barriers to/and or personal factors that will affect rehab potential:              [x]Age  []Sex              []Motivation/Lack of Motivation                        [x]Co-Morbidities              []Cognitive Function, education/learning barriers              []Environmental, home barriers              []profession/work barriers  []past PT/medical experience  []other:  Justification:     Falls Risk Assessment (30 days):   [x] Falls Risk assessed and no intervention required. [] Falls Risk assessed and Patient requires intervention due to being higher risk   TUG score (>12s at risk):     [] Falls education provided, including           ASSESSMENT: Patient demonstrates decreased ROM and strength in her right knee, limiting her ability to ambulate, perform sit <> stand transfers, and ascend/descend stairs reciprocally without pain. Will benefit from skilled PT to address limitations. Functional Impairments:     [x]Noted lumbar/proximal hip/LE joint hypomobility   [x]Decreased LE functional ROM   [x]Decreased core/proximal hip strength and neuromuscular control   [x]Decreased LE functional strength   [x]Reduced balance/proprioceptive control   []other:      Functional Activity Limitations (from functional questionnaire and intake)   [x]Reduced ability to tolerate prolonged functional positions   [x]Reduced ability or difficulty with changes of positions or transfers between positions   []Reduced ability to maintain good posture and demonstrate good body mechanics with sitting, bending, and lifting   []Reduced ability to sleep   [] Reduced ability or tolerance with driving and/or computer work   []Reduced ability to perform lifting, carrying tasks   [x]Reduced ability to squat   []Reduced ability to forward bend   [x]Reduced ability to ambulate prolonged functional periods/distances/surfaces   [x]Reduced ability to ascend/descend stairs   []Reduced ability to run, hop, cut or jump   []other:    Participation Restrictions   []Reduced participation in self care activities   [x]Reduced participation in home management activities   []Reduced participation in work activities   []Reduced participation in social activities. [x]Reduced participation in sport/recreation activities. Classification :    []Signs/symptoms consistent with post-surgical status including decreased ROM, strength and function.    []Signs/symptoms consistent with joint sprain/strain []Signs/symptoms consistent with patella-femoral syndrome   [x]Signs/symptoms consistent with knee OA/hip OA   []Signs/symptoms consistent with internal derangement of knee/Hip   []Signs/symptoms consistent with functional hip weakness/NMR control      []Signs/symptoms consistent with tendinitis/tendinosis    []signs/symptoms consistent with pathology which may benefit from Dry needling      []other:      Prognosis/Rehab Potential:      []Excellent   []Good    [x]Fair   []Poor    Tolerance of evaluation/treatment:    []Excellent   []Good    [x]Fair   []Poor  Physical Therapy Evaluation Complexity Justification  [x] A history of present problem with:  [] no personal factors and/or comorbidities that impact the plan of care;  []1-2 personal factors and/or comorbidities that impact the plan of care  [x]3 personal factors and/or comorbidities that impact the plan of care  [x] An examination of body systems using standardized tests and measures addressing any of the following: body structures and functions (impairments), activity limitations, and/or participation restrictions;:  [x] a total of 1-2 or more elements   [] a total of 3 or more elements   [] a total of 4 or more elements   [x] A clinical presentation with:  [x] stable and/or uncomplicated characteristics   [] evolving clinical presentation with changing characteristics  [] unstable and unpredictable characteristics;   [x] Clinical decision making of [x] low, [] moderate, [] high complexity using standardized patient assessment instrument and/or measurable assessment of functional outcome.     [x] EVAL (LOW) 30670 (typically 20 minutes face-to-face)  [] EVAL (MOD) 07602 (typically 30 minutes face-to-face)  [] EVAL (HIGH) 53546 (typically 45 minutes face-to-face)  [] RE-EVAL       PLAN:   Frequency/Duration:  2 days per week for 8 Weeks:  Interventions:  [x]  Therapeutic exercise including: strength training, ROM, for Lower extremity and core [x]  NMR activation and proprioception for LE, Glutes and Core   [x]  Manual therapy as indicated for LE, Hip and spine to include: Dry Needling/IASTM, STM, PROM, Gr I-IV mobilizations, manipulation. [x] Modalities as needed that may include: thermal agents, E-stim, Biofeedback, US, iontophoresis as indicated  [x] Patient education on joint protection, postural re-education, activity modification, progression of HEP. HEP instruction: Access Code: [email or web address]   URL: [email or web address]   Date: 02/24/2021   Prepared by: Hero Cespedes      GOALS:  Patient stated goal: \"To be able to walk without pain. \"  [] Progressing: [] Met: [] Not Met: [] Adjusted    Therapist goals for Patient:   Short Term Goals: To be achieved in: 2 weeks  1. Independent in HEP and progression per patient tolerance, in order to prevent re-injury. [] Progressing: [] Met: [] Not Met: [] Adjusted  2. Patient will have a decrease in pain to facilitate improvement in movement, function, and ADLs as indicated by Functional Deficits. [] Progressing: [] Met: [] Not Met: [] Adjusted    Long Term Goals: To be achieved in: 8 weeks  1. Disability index score of 16% or less for the LEFS to assist with reaching prior level of function. [] Progressing: [] Met: [] Not Met: [] Adjusted  2. Patient will demonstrate increased AROM of right knee flexion to 110 degrees to allow for proper joint functioning as indicated by patients Functional Deficits. [] Progressing: [] Met: [] Not Met: [] Adjusted  3. Patient will demonstrate an increase in Strength in right knee flexion and extension to 4+/5 and right hip abduction to 4/5 to allow for proper functional mobility as indicated by patients Functional Deficits. [] Progressing: [] Met: [] Not Met: [] Adjusted  4. Patient will be able to ascend/descend stairs reciprocally without increased symptoms or restriction.    [] Progressing: [] Met: [] Not Met: [] Adjusted 5. Patient will be able to ambulate for 10 minutes without antalgic gait pattern without increased symptoms or restrictions. (patient specific functional goal)    [] Progressing: [] Met: [] Not Met: [] Adjusted     Electronically signed by:  Lesly Taylor, PT, DPT [id number]

## 2021-03-01 ENCOUNTER — HOSPITAL ENCOUNTER (OUTPATIENT)
Dept: PHYSICAL THERAPY | Age: 56
Setting detail: THERAPIES SERIES
Discharge: HOME | End: 2021-03-01
Payer: COMMERCIAL

## 2021-03-01 PROCEDURE — 97110 THERAPEUTIC EXERCISES: CPT | Performed by: PHYSICAL THERAPIST

## 2021-03-01 PROCEDURE — 97140 MANUAL THERAPY 1/> REGIONS: CPT | Performed by: PHYSICAL THERAPIST

## 2021-03-01 NOTE — FLOWSHEET NOTE
[facility]  Orthopaedics and Sports Rehabilitation, [address] [facility], [address] [place], [place]      Physical Therapy Treatment Note/ Progress Report:           Date:  3/1/2021    Patient Name:  Tuyet Baker    :  1965  MRN: [id number]  Restrictions/Precautions:    Medical/Treatment Diagnosis Information:  · Diagnosis: Right knee OA (M17.11)  · Treatment Diagnosis: Right knee pain  (K84.790)  Insurance/Certification information:  PT Insurance Information: Hum[employer]  Physician Information:  Referring Practitioner: Verónica Ramon  Has the plan of care been signed (Y/N):        [x]  Yes  []  No     Date of Patient follow up with Physician: 2021      Is this a Progress Report:     []  Yes  [x]  No        If Yes:  Date Range for reporting period:  Beginnin2021  Ending    Progress report will be due (10 Rx or 30 days whichever is less):        Recertification will be due (POC Duration  / 90 days whichever is less):       Visit # Insurance Allowable Auth Required   In-person 2 Not yet available [x]  Yes []  No    Telehealth   []  Yes []  No    Total          Functional Scale: LEFS 52/80 (35%)    Date assessed:  2021      Therapy Diagnosis/Practice Pattern: D      Number of Comorbidities:  []0     []1-2    [x]3+    Latex Allergy:  [x]NO      []YES  Preferred Language for Healthcare:   [x]English       []other:      Pain level:  10     SUBJECTIVE:  Patient reports her knee feels about the same, but has not really been able to do the exercises because she had a busy weekend.      OBJECTIVE:   Observation:   ? Test measurements:  Heel slide AROM flexion 95 degrees    RESTRICTIONS/PRECAUTIONS: hx of DM    Exercises/Interventions:     Therapeutic Ex (59970) Sets/sec Reps Notes/CUES   HS stretch tableside 30\" 3    Incline stretch 30\" 3    Quad sets 5\" 20x With towel roll   SLR flexion 3 5 Improved from last visit   Marilyn 3\" 20x Cueing for hip position Bridging 3\" 15x    SAQ  3\" 15x          DHR 2 10 Cueing for neutral foot position   Bike 5'  For ROM               Manual Intervention (08112)      Massage stick to quad, IT band, hamstring, gastroc, patellar mobilizations 10'                                   NMR re-education (85218)   CUES NEEDED   Tandem balance 10\" 3 Left foot behind                                                   Therapeutic Activity (86894)                                          HEP instruction: Access Code: [email or web address]   URL: [employer]/   Date: 02/24/2021   Prepared by: Kate Mclaughlin        Therapeutic Exercise and NMR EXR  [x] (06021) Provided verbal/tactile cueing for activities related to strengthening, flexibility, endurance, ROM for improvements in LE, proximal hip, and core control with self care, mobility, lifting, ambulation.  [] (41965) Provided verbal/tactile cueing for activities related to improving balance, coordination, kinesthetic sense, posture, motor skill, proprioception  to assist with LE, proximal hip, and core control in self care, mobility, lifting, ambulation and eccentric single leg control.      NMR and Therapeutic Activities:    [] (66142 or 88016) Provided verbal/tactile cueing for activities related to improving balance, coordination, kinesthetic sense, posture, motor skill, proprioception and motor activation to allow for proper function of core, proximal hip and LE with self care and ADLs  [] (04281) Gait Re-education- Provided training and instruction to the patient for proper LE, core and proximal hip recruitment and positioning and eccentric body weight control with ambulation re-education including up and down stairs     Home Exercise Program:    [x] (94393) Reviewed/Progressed HEP activities related to strengthening, flexibility, endurance, ROM of core, proximal hip and LE for functional self-care, mobility, lifting and ambulation/stair navigation [] (44673)Reviewed/Progressed HEP activities related to improving balance, coordination, kinesthetic sense, posture, motor skill, proprioception of core, proximal hip and LE for self care, mobility, lifting, and ambulation/stair navigation      Manual Treatments:  PROM / STM / Oscillations-Mobs:  G-I, II, III, IV (PA's, Inf., Post.)  [x] (07210) Provided manual therapy to mobilize LE, proximal hip and/or LS spine soft tissue/joints for the purpose of modulating pain, promoting relaxation,  increasing ROM, reducing/eliminating soft tissue swelling/inflammation/restriction, improving soft tissue extensibility and allowing for proper ROM for normal function with self care, mobility, lifting and ambulation. Modalities:  None this date   [] GAME READY (VASO)- for significant edema, swelling, pain control. Charges:  Timed Code Treatment Minutes: 40'   Total Treatment Minutes: 48'       [x] EVAL (LOW) 97878   [] EVAL (MOD) 67858  [] EVAL (HIGH) 60444   [] RE-EVAL     [] IJ(81465) x   2  [] IONTO  [] NMR (59224) x     [] VASO  [x] Manual (00252) x 1     [] Other:  [] TA x      [] Mech Traction (20526)  [] ES(attended) (90122)      [] ES (un) (56376):       GOALS:   Patient stated goal: \"To be able to walk without pain. \"  [] Progressing: [] Met: [] Not Met: [] Adjusted    Therapist goals for Patient:   Short Term Goals: To be achieved in: 2 weeks  1. Independent in HEP and progression per patient tolerance, in order to prevent re-injury. [] Progressing: [] Met: [] Not Met: [] Adjusted  2. Patient will have a decrease in pain to facilitate improvement in movement, function, and ADLs as indicated by Functional Deficits. [] Progressing: [] Met: [] Not Met: [] Adjusted    Long Term Goals: To be achieved in: 8 weeks  1. Disability index score of 16% or less for the LEFS to assist with reaching prior level of function.    [] Progressing: [] Met: [] Not Met: [] Adjusted 2. Patient will demonstrate increased AROM of right knee flexion to 110 degrees to allow for proper joint functioning as indicated by patients Functional Deficits. [] Progressing: [] Met: [] Not Met: [] Adjusted  3. Patient will demonstrate an increase in Strength in right knee flexion and extension to 4+/5 and right hip abduction to 4/5 to allow for proper functional mobility as indicated by patients Functional Deficits. [] Progressing: [] Met: [] Not Met: [] Adjusted  4. Patient will be able to ascend/descend stairs reciprocally without increased symptoms or restriction. [] Progressing: [] Met: [] Not Met: [] Adjusted  5. Patient will be able to ambulate for 10 minutes without antalgic gait pattern without increased symptoms or restrictions. (patient specific functional goal)    [] Progressing: [] Met: [] Not Met: [] Adjusted     Overall Progression Towards Functional goals/ Treatment Progress Update:  [x] Patient is progressing as expected towards functional goals listed. [] Progression is slowed due to complexities/Impairments listed. [] Progression has been slowed due to co-morbidities. [] Plan just implemented, too soon to assess goals progression <30days   [] Goals require adjustment due to lack of progress  [] Patient is not progressing as expected and requires additional follow up with physician  [] Other    Prognosis for POC: [x] Good [] Fair  [] Poor      Patient requires continued skilled intervention: [x] Yes  [] No    Treatment/Activity Tolerance:  [x] Patient able to complete treatment  [] Patient limited by fatigue  [] Patient limited by pain    [] Patient limited by other medical complications  [] Other:     ASSESSMENT:  Patient with improved ROM since initial visit, and able to tolerate more exercise. Demonstrates improvement from last week despite not keeping up with HEP. Discussed the importance of compliance to continue to improve ROM and strength.        PLAN: See eval

## 2021-03-03 ENCOUNTER — HOSPITAL ENCOUNTER (OUTPATIENT)
Dept: PHYSICAL THERAPY | Age: 56
Setting detail: THERAPIES SERIES
Discharge: HOME | End: 2021-03-03
Payer: COMMERCIAL

## 2021-03-03 PROCEDURE — 97110 THERAPEUTIC EXERCISES: CPT | Performed by: PHYSICAL THERAPIST

## 2021-03-03 PROCEDURE — 97140 MANUAL THERAPY 1/> REGIONS: CPT | Performed by: PHYSICAL THERAPIST

## 2021-03-03 NOTE — FLOWSHEET NOTE
The [address], [address] [facility], [address], [place]      Physical Therapy Treatment Note/ Progress Report:           Date:  3/3/2021    Patient Name:  Bonnie Khalil    :  1965  MRN: [id number]  Restrictions/Precautions:    Medical/Treatment Diagnosis Information:  · Diagnosis: Right knee OA (M17.11)  · Treatment Diagnosis: Right knee pain  (W14.318)  Insurance/Certification information:  PT Insurance Information: [employer]  Physician Information:  Referring Practitioner: Helen Baker  Has the plan of care been signed (Y/N):        [x]  Yes  []  No     Date of Patient follow up with Physician: 2021      Is this a Progress Report:     []  Yes  [x]  No        If Yes:  Date Range for reporting period:  Beginnin2021  Ending    Progress report will be due (10 Rx or 30 days whichever is less): 9623       Recertification will be due (POC Duration  / 90 days whichever is less):       Visit # Insurance Allowable Auth Required   In-person 3 Not yet available [x]  Yes []  No    Telehealth   []  Yes []  No    Total          Functional Scale: LEFS 52/80 (35%)    Date assessed:  2021      Therapy Diagnosis/Practice Pattern: D      Number of Comorbidities:  []0     []1-2    [x]3+    Latex Allergy:  [x]NO      []YES  Preferred Language for Healthcare:   [x]English       []other:      Pain level:  3-4/10     SUBJECTIVE:  Patient reports she was able to complete the exercises yesterday and they made her sore.      OBJECTIVE:   Observation:    Test measurements:  Heel slide AROM flexion 107 degrees    RESTRICTIONS/PRECAUTIONS: hx of DM    Exercises/Interventions:     Therapeutic Ex (33694) Sets/sec Reps Notes/CUES   HS stretch tableside 30\" 3    Incline stretch 30\" 3    With towel roll   SLR flexion 2 10 Continues to improve   Clamshells 3\" 20x Cueing for hip position   Bridging 3\" 20x    SAQ  3\" 20x    SLR abd NV     DHR 2 10 Cueing for neutral foot position   Bike 5'  For ROM   Minisquats 3\" 8 Cueing for hip position   Patient ed   OTC arch support, ice   Manual Intervention (44337)      Massage stick to quad, IT band, hamstring, gastroc, patellar mobilizations 10'                                   NMR re-education (30082)   CUES NEEDED   Tandem balance 10\" 5 Left foot behind                                                   Therapeutic Activity (86516)                                          HEP instruction: Access Code: [email or web address]   URL: [employer]/   Date: 02/24/2021   Prepared by: Constantine Kiser        Therapeutic Exercise and NMR EXR  [x] (32278) Provided verbal/tactile cueing for activities related to strengthening, flexibility, endurance, ROM for improvements in LE, proximal hip, and core control with self care, mobility, lifting, ambulation.  [] (37088) Provided verbal/tactile cueing for activities related to improving balance, coordination, kinesthetic sense, posture, motor skill, proprioception  to assist with LE, proximal hip, and core control in self care, mobility, lifting, ambulation and eccentric single leg control.      NMR and Therapeutic Activities:    [] (59266 or 26884) Provided verbal/tactile cueing for activities related to improving balance, coordination, kinesthetic sense, posture, motor skill, proprioception and motor activation to allow for proper function of core, proximal hip and LE with self care and ADLs  [] (05004) Gait Re-education- Provided training and instruction to the patient for proper LE, core and proximal hip recruitment and positioning and eccentric body weight control with ambulation re-education including up and down stairs     Home Exercise Program:    [x] (35474) Reviewed/Progressed HEP activities related to strengthening, flexibility, endurance, ROM of core, proximal hip and LE for functional self-care, mobility, lifting and ambulation/stair navigation   [] (97340)Reviewed/Progressed HEP activities related to improving balance, coordination, kinesthetic sense, posture, motor skill, proprioception of core, proximal hip and LE for self care, mobility, lifting, and ambulation/stair navigation      Manual Treatments:  PROM / STM / Oscillations-Mobs:  G-I, II, III, IV (PA's, Inf., Post.)  [x] (06060) Provided manual therapy to mobilize LE, proximal hip and/or LS spine soft tissue/joints for the purpose of modulating pain, promoting relaxation,  increasing ROM, reducing/eliminating soft tissue swelling/inflammation/restriction, improving soft tissue extensibility and allowing for proper ROM for normal function with self care, mobility, lifting and ambulation. Modalities:  CP x 12'   [] GAME READY (VASO)- for significant edema, swelling, pain control. Charges:  Timed Code Treatment Minutes: 45'   Total Treatment Minutes: 40'       [] EVAL (LOW) 58795   [] EVAL (MOD) 70673  [] EVAL (HIGH) 52481   [] RE-EVAL     [x] BD(30987) x   2  [] IONTO  [] NMR (18984) x     [] VASO  [x] Manual (95536) x 1     [] Other:  [] TA x      [] Mech Traction (78646)  [] ES(attended) (85881)      [] ES (un) (71555):       GOALS:   Patient stated goal: \"To be able to walk without pain. \"  [] Progressing: [] Met: [] Not Met: [] Adjusted    Therapist goals for Patient:   Short Term Goals: To be achieved in: 2 weeks  1. Independent in HEP and progression per patient tolerance, in order to prevent re-injury. [] Progressing: [] Met: [] Not Met: [] Adjusted  2. Patient will have a decrease in pain to facilitate improvement in movement, function, and ADLs as indicated by Functional Deficits. [] Progressing: [] Met: [] Not Met: [] Adjusted    Long Term Goals: To be achieved in: 8 weeks  1. Disability index score of 16% or less for the LEFS to assist with reaching prior level of function. [] Progressing: [] Met: [] Not Met: [] Adjusted  2.  Patient will demonstrate increased AROM of right knee flexion to 110 degrees to allow for proper joint functioning as indicated by patients Functional Deficits. [] Progressing: [] Met: [] Not Met: [] Adjusted  3. Patient will demonstrate an increase in Strength in right knee flexion and extension to 4+/5 and right hip abduction to 4/5 to allow for proper functional mobility as indicated by patients Functional Deficits. [] Progressing: [] Met: [] Not Met: [] Adjusted  4. Patient will be able to ascend/descend stairs reciprocally without increased symptoms or restriction. [] Progressing: [] Met: [] Not Met: [] Adjusted  5. Patient will be able to ambulate for 10 minutes without antalgic gait pattern without increased symptoms or restrictions. (patient specific functional goal)    [] Progressing: [] Met: [] Not Met: [] Adjusted     Overall Progression Towards Functional goals/ Treatment Progress Update:  [x] Patient is progressing as expected towards functional goals listed. [] Progression is slowed due to complexities/Impairments listed. [] Progression has been slowed due to co-morbidities. [] Plan just implemented, too soon to assess goals progression <30days   [] Goals require adjustment due to lack of progress  [] Patient is not progressing as expected and requires additional follow up with physician  [] Other    Prognosis for POC: [x] Good [] Fair  [] Poor      Patient requires continued skilled intervention: [x] Yes  [] No    Treatment/Activity Tolerance:  [x] Patient able to complete treatment  [] Patient limited by fatigue  [] Patient limited by pain    [] Patient limited by other medical complications  [] Other:     ASSESSMENT:  Patient continues to demonstrate improved ROM. Able to tolerate increased exercises this date, but did fatigue by conclusion of session. Patient demonstrates pes planus of bilateral feet. Discussed trying over the counter arch support.       PLAN: See eval  [x] Continue per plan of care [] Alter current plan (see comments above)  [] Plan of care initiated [] Hold pending MD visit [] Discharge      Electronically signed by:  Ilir Lopez PT, DPT [id number]     Note: If patient does not return for scheduled/ recommended follow up visits, this note will serve as a discharge from care along with most recent update on progress.

## 2021-03-05 DIAGNOSIS — I10 ESSENTIAL HYPERTENSION, BENIGN: ICD-10-CM

## 2021-03-08 ENCOUNTER — HOSPITAL ENCOUNTER (OUTPATIENT)
Dept: PHYSICAL THERAPY | Age: 56
Setting detail: THERAPIES SERIES
Discharge: HOME | End: 2021-03-08
Payer: COMMERCIAL

## 2021-03-08 PROCEDURE — 97140 MANUAL THERAPY 1/> REGIONS: CPT | Performed by: PHYSICAL THERAPIST

## 2021-03-08 PROCEDURE — 97110 THERAPEUTIC EXERCISES: CPT | Performed by: PHYSICAL THERAPIST

## 2021-03-08 RX ORDER — AMLODIPINE BESYLATE 10 MG/1
TABLET ORAL
Qty: 30 TABLET | Refills: 5 | Status: SHIPPED | OUTPATIENT
Start: 2021-03-08 | End: 2021-05-06 | Stop reason: SDUPTHER

## 2021-03-08 NOTE — FLOWSHEET NOTE
[facility], [address] [facility], [address] [place], [place]      Physical Therapy Treatment Note/ Progress Report:           Date:  3/8/2021    Patient Name:  Dima Luciano    :  1965  MRN: [id number]  Restrictions/Precautions:    Medical/Treatment Diagnosis Information:  · Diagnosis: Right knee OA (M17.11)  · Treatment Diagnosis: Right knee pain  (G10.193)  Insurance/Certification information:  PT Insurance Information: Humana  Physician Information:  Referring Practitioner: Houston Justice  Has the plan of care been signed (Y/N):        [x]  Yes  []  No     Date of Patient follow up with Physician: 2021      Is this a Progress Report:     []  Yes  [x]  No        If Yes:  Date Range for reporting period:  Beginnin2021  Ending    Progress report will be due (10 Rx or 30 days whichever is less):        Recertification will be due (POC Duration  / 90 days whichever is less):       Visit # Insurance Allowable Auth Required   In-person 4 16 visits 2021 - 2021 [x]  Yes []  No    Telehealth   []  Yes []  No    Total          Functional Scale: LEFS 52/80 (35%)    Date assessed:  2021      Therapy Diagnosis/Practice Pattern: D      Number of Comorbidities:  []0     []1-2    [x]3+    Latex Allergy:  [x]NO      []YES  Preferred Language for Healthcare:   [x]English       []other:      Pain level:  2-3/10     SUBJECTIVE:  Patient reports she was able to complete the exercises yesterday and they made her sore.      OBJECTIVE:   Observation:    Test measurements: NT this date    RESTRICTIONS/PRECAUTIONS: hx of DM    Exercises/Interventions:     Therapeutic Ex (12409) Sets/sec Reps Notes/CUES   HS stretch tableside 30\" 3    Incline stretch 30\" 3    With towel roll   SLR flexion 2 10 Continues to improve   Clamshells 3\" 20x Cueing for hip position   Bridging 3\" 20x       SLR abd 1 15 challenging   LAQ 5\" 15x    DHR 2 10 Cueing for neutral foot position   Bike 5'  For ROM         Minisquats 3\" 13 Cueing for hip position   Patient ed   OTC arch support, ice   Manual Intervention (77502)      Massage stick to quad, IT band, hamstring, gastroc, patellar mobilizations 8'                                   NMR re-education (40126)   CUES NEEDED   Tandem balance airex 10\" 5 Left foot behind                                                   Therapeutic Activity (62997)                                          HEP instruction: Access Code: [email or web address]   URL: [email or web address]   Date: 02/24/2021   Prepared by: Gabino Reeves        Therapeutic Exercise and NMR EXR  [x] (05020) Provided verbal/tactile cueing for activities related to strengthening, flexibility, endurance, ROM for improvements in LE, proximal hip, and core control with self care, mobility, lifting, ambulation.  [] (49401) Provided verbal/tactile cueing for activities related to improving balance, coordination, kinesthetic sense, posture, motor skill, proprioception  to assist with LE, proximal hip, and core control in self care, mobility, lifting, ambulation and eccentric single leg control.      NMR and Therapeutic Activities:    [] (73390 or 28853) Provided verbal/tactile cueing for activities related to improving balance, coordination, kinesthetic sense, posture, motor skill, proprioception and motor activation to allow for proper function of core, proximal hip and LE with self care and ADLs  [] (27066) Gait Re-education- Provided training and instruction to the patient for proper LE, core and proximal hip recruitment and positioning and eccentric body weight control with ambulation re-education including up and down stairs     Home Exercise Program:    [x] (76334) Reviewed/Progressed HEP activities related to strengthening, flexibility, endurance, ROM of core, proximal hip and LE for functional self-care, mobility, lifting and ambulation/stair navigation   [] (13558)Reviewed/Progressed HEP activities related to improving balance, coordination, kinesthetic sense, posture, motor skill, proprioception of core, proximal hip and LE for self care, mobility, lifting, and ambulation/stair navigation      Manual Treatments:  PROM / STM / Oscillations-Mobs:  G-I, II, III, IV (PA's, Inf., Post.)  [x] (04867) Provided manual therapy to mobilize LE, proximal hip and/or LS spine soft tissue/joints for the purpose of modulating pain, promoting relaxation,  increasing ROM, reducing/eliminating soft tissue swelling/inflammation/restriction, improving soft tissue extensibility and allowing for proper ROM for normal function with self care, mobility, lifting and ambulation. Modalities:  CP x 12'   [] GAME READY (VASO)- for significant edema, swelling, pain control. Charges:  Timed Code Treatment Minutes: 40'   Total Treatment Minutes: 46'       [] EVAL (LOW) 26168   [] EVAL (MOD) 76972  [] EVAL (HIGH) 75095   [] RE-EVAL     [x] SA(93656) x   2  [] IONTO  [] NMR (32004) x     [] VASO  [x] Manual (34105) x 1     [] Other:  [] TA x      [] Mech Traction (95427)  [] ES(attended) (74908)      [] ES (un) (89175):       GOALS:   Patient stated goal: \"To be able to walk without pain. \"  [] Progressing: [] Met: [] Not Met: [] Adjusted    Therapist goals for Patient:   Short Term Goals: To be achieved in: 2 weeks  1. Independent in HEP and progression per patient tolerance, in order to prevent re-injury. [] Progressing: [] Met: [] Not Met: [] Adjusted  2. Patient will have a decrease in pain to facilitate improvement in movement, function, and ADLs as indicated by Functional Deficits. [] Progressing: [] Met: [] Not Met: [] Adjusted    Long Term Goals: To be achieved in: 8 weeks  1. Disability index score of 16% or less for the LEFS to assist with reaching prior level of function. [] Progressing: [] Met: [] Not Met: [] Adjusted  2.  Patient will demonstrate increased AROM of right knee flexion to 110 degrees to allow for proper joint functioning as indicated by patients Functional Deficits. [] Progressing: [] Met: [] Not Met: [] Adjusted  3. Patient will demonstrate an increase in Strength in right knee flexion and extension to 4+/5 and right hip abduction to 4/5 to allow for proper functional mobility as indicated by patients Functional Deficits. [] Progressing: [] Met: [] Not Met: [] Adjusted  4. Patient will be able to ascend/descend stairs reciprocally without increased symptoms or restriction. [] Progressing: [] Met: [] Not Met: [] Adjusted  5. Patient will be able to ambulate for 10 minutes without antalgic gait pattern without increased symptoms or restrictions. (patient specific functional goal)    [] Progressing: [] Met: [] Not Met: [] Adjusted     Overall Progression Towards Functional goals/ Treatment Progress Update:  [x] Patient is progressing as expected towards functional goals listed. [] Progression is slowed due to complexities/Impairments listed. [] Progression has been slowed due to co-morbidities. [] Plan just implemented, too soon to assess goals progression <30days   [] Goals require adjustment due to lack of progress  [] Patient is not progressing as expected and requires additional follow up with physician  [] Other    Prognosis for POC: [x] Good [] Fair  [] Poor      Patient requires continued skilled intervention: [x] Yes  [] No    Treatment/Activity Tolerance:  [x] Patient able to complete treatment  [] Patient limited by fatigue  [] Patient limited by pain    [] Patient limited by other medical complications  [] Other:     ASSESSMENT:  Patient with less antalgic gait pattern this date. Also with reports of less pain. Is demonstrating progression towards goals.        PLAN: See eval  [x] Continue per plan of care [] Alter current plan (see comments above)  [] Plan of care initiated [] Hold pending MD visit [] Discharge      Electronically signed by:  Chad Mak Nick, PT, DPT [id number]     Note: If patient does not return for scheduled/ recommended follow up visits, this note will serve as a discharge from care along with most recent update on progress.

## 2021-03-10 ENCOUNTER — HOSPITAL ENCOUNTER (OUTPATIENT)
Dept: PHYSICAL THERAPY | Age: 56
Setting detail: THERAPIES SERIES
Discharge: HOME | End: 2021-03-10
Payer: COMMERCIAL

## 2021-03-10 PROCEDURE — 97110 THERAPEUTIC EXERCISES: CPT | Performed by: PHYSICAL THERAPIST

## 2021-03-10 PROCEDURE — 97140 MANUAL THERAPY 1/> REGIONS: CPT | Performed by: PHYSICAL THERAPIST

## 2021-03-10 NOTE — FLOWSHEET NOTE
[facility]  Orthopaedics and Sports Rehabilitation, [address] [facility], [address], [place]      Physical Therapy Treatment Note/ Progress Report:           Date:  3/10/2021    Patient Name:  Jono Spain    :  1965  MRN: [id number]  Restrictions/Precautions:    Medical/Treatment Diagnosis Information:  · Diagnosis: Right knee OA (M17.11)  · Treatment Diagnosis: Right knee pain  (U69.687)  Insurance/Certification information:  PT Insurance Information: Humana  Physician Information:  Referring Practitioner: Cassia Kamara  Has the plan of care been signed (Y/N):        [x]  Yes  []  No     Date of Patient follow up with Physician: 2021      Is this a Progress Report:     []  Yes  [x]  No        If Yes:  Date Range for reporting period:  Beginnin2021  Ending    Progress report will be due (10 Rx or 30 days whichever is less): 3/73/1972       Recertification will be due (POC Duration  / 90 days whichever is less):       Visit # Insurance Allowable Auth Required   In-person 5 16 visits 2021 - 2021 [x]  Yes []  No    Telehealth   []  Yes []  No    Total          Functional Scale: LEFS 52/80 (35%)    Date assessed:  2021      Therapy Diagnosis/Practice Pattern: D      Number of Comorbidities:  []0     []1-2    [x]3+    Latex Allergy:  [x]NO      []YES  Preferred Language for Healthcare:   [x]English       []other:      Pain level:  2/10     SUBJECTIVE:  Patient reports she has some pain on the side of her knee. Can tell she is moving a lot better.    OBJECTIVE:   Observation:    Test measurements: NT this date    RESTRICTIONS/PRECAUTIONS: hx of DM    Exercises/Interventions:     Therapeutic Ex (54516) Sets/sec Reps Notes/CUES   HS stretch tableside 30\" 3    Incline stretch 30\" 3    With towel roll   SLR flexion 2 10 Continues to improve   Clamshells 3\" 20x Cueing for hip position   Bridging 3\" 20x       SLR abd 2 10 challenging   LAQ 5\" 20x    DHR 2 10 Cueing for neutral foot position   Bike 5'  For ROM         Minisquats 3\" 13 Cueing for hip position   Patient ed   OTC arch support, ice   Manual Intervention (44020)      Massage stick to quad, IT band, hamstring, gastroc, patellar mobilizations 10'                                   NMR re-education (83277)   CUES NEEDED   Tandem balance airex 10\" 10 Right foot behind   LBW GVL above knees 3 laps 12'                                              Therapeutic Activity (40390)                                          HEP instruction: Access Code: [email or web address]   URL: [email or web address]   Date: 02/24/2021   Prepared by: Leeann Patterson        Therapeutic Exercise and NMR EXR  [x] (48203) Provided verbal/tactile cueing for activities related to strengthening, flexibility, endurance, ROM for improvements in LE, proximal hip, and core control with self care, mobility, lifting, ambulation.  [] (60455) Provided verbal/tactile cueing for activities related to improving balance, coordination, kinesthetic sense, posture, motor skill, proprioception  to assist with LE, proximal hip, and core control in self care, mobility, lifting, ambulation and eccentric single leg control.      NMR and Therapeutic Activities:    [] (91693 or 14656) Provided verbal/tactile cueing for activities related to improving balance, coordination, kinesthetic sense, posture, motor skill, proprioception and motor activation to allow for proper function of core, proximal hip and LE with self care and ADLs  [] (50848) Gait Re-education- Provided training and instruction to the patient for proper LE, core and proximal hip recruitment and positioning and eccentric body weight control with ambulation re-education including up and down stairs     Home Exercise Program:    [x] (18503) Reviewed/Progressed HEP activities related to strengthening, flexibility, endurance, ROM of core, proximal hip and LE for functional self-care, mobility, lifting and ambulation/stair

## 2021-03-15 ENCOUNTER — HOSPITAL ENCOUNTER (OUTPATIENT)
Dept: PHYSICAL THERAPY | Age: 56
Setting detail: THERAPIES SERIES
Discharge: HOME | End: 2021-03-15
Payer: COMMERCIAL

## 2021-03-15 PROCEDURE — 97110 THERAPEUTIC EXERCISES: CPT | Performed by: PHYSICAL THERAPIST

## 2021-03-15 PROCEDURE — 97140 MANUAL THERAPY 1/> REGIONS: CPT | Performed by: PHYSICAL THERAPIST

## 2021-03-15 NOTE — FLOWSHEET NOTE
The [facility]  Orthopaedics and Sports Rehabilitation, [address] [facility], [address], [place]      Physical Therapy Treatment Note/ Progress Report:           Date:  3/15/2021    Patient Name:  Tadeo Samuel    :  1965  MRN: [id number]  Restrictions/Precautions:    Medical/Treatment Diagnosis Information:  · Diagnosis: Right knee OA (M17.11)  · Treatment Diagnosis: Right knee pain  (V68.594)  Insurance/Certification information:  PT Insurance Information: [employer]  Physician Information:  Referring Practitioner: Patria William  Has the plan of care been signed (Y/N):        [x]  Yes  []  No     Date of Patient follow up with Physician: 2021      Is this a Progress Report:     []  Yes  [x]  No        If Yes:  Date Range for reporting period:  Beginnin2021  Ending    Progress report will be due (10 Rx or 30 days whichever is less): 4631       Recertification will be due (POC Duration  / 90 days whichever is less):       Visit # Insurance Allowable Auth Required   In-person 6 16 visits 2021 - 2021 [x]  Yes []  No    Telehealth   []  Yes []  No    Total          Functional Scale: LEFS 52/80 (35%)    Date assessed:  2021      Therapy Diagnosis/Practice Pattern: D      Number of Comorbidities:  []0     []1-2    [x]3+    Latex Allergy:  [x]NO      []YES  Preferred Language for Healthcare:   [x]English       []other:      Pain level:  2-3/10     SUBJECTIVE:  Patient reports her knee is not doing too bad today.   OBJECTIVE:   Observation:    Test measurements: NT this date    RESTRICTIONS/PRECAUTIONS: hx of DM    Exercises/Interventions:     Therapeutic Ex (95590) Sets/sec Reps Notes/CUES   HS stretch tableside 30\" 3    Incline stretch 30\" 3    With towel roll   SLR flexion 2 15 Continues to improve   Clamshells 3\" 30x Cueing for hip position   Prone quad stretch with belt 30\" 3          challenging   LAQ 1# 5\" 20x    Cueing for neutral foot position   Bike 5' For ROM   HSC on leg machine 2 10 30#   Minisquats 3\" 21 Cueing for hip position   Patient ed   OTC arch support, ice   Manual Intervention (98086)      Massage stick to quad, IT band, hamstring, gastroc, patellar mobilizations 8'                                   NMR re-education (85085)   CUES NEEDED   Tandem balance airex 10\" 5 Right foot behind   LBW GVL above knees 3 laps 12'    SLB 10\" 5x Fingertip support                                       Therapeutic Activity (96771)                                          HEP instruction: Access Code: [email or web address]   URL: [employer]/   Date: 02/24/2021   Prepared by: Nithya Torres        Therapeutic Exercise and NMR EXR  [x] (31186) Provided verbal/tactile cueing for activities related to strengthening, flexibility, endurance, ROM for improvements in LE, proximal hip, and core control with self care, mobility, lifting, ambulation.  [] (44139) Provided verbal/tactile cueing for activities related to improving balance, coordination, kinesthetic sense, posture, motor skill, proprioception  to assist with LE, proximal hip, and core control in self care, mobility, lifting, ambulation and eccentric single leg control.      NMR and Therapeutic Activities:    [] (38207 or 05273) Provided verbal/tactile cueing for activities related to improving balance, coordination, kinesthetic sense, posture, motor skill, proprioception and motor activation to allow for proper function of core, proximal hip and LE with self care and ADLs  [] (98501) Gait Re-education- Provided training and instruction to the patient for proper LE, core and proximal hip recruitment and positioning and eccentric body weight control with ambulation re-education including up and down stairs     Home Exercise Program:    [x] (55807) Reviewed/Progressed HEP activities related to strengthening, flexibility, endurance, ROM of core, proximal hip and LE for functional self-care, mobility, lifting and ambulation/stair navigation   [] (93891)Reviewed/Progressed HEP activities related to improving balance, coordination, kinesthetic sense, posture, motor skill, proprioception of core, proximal hip and LE for self care, mobility, lifting, and ambulation/stair navigation      Manual Treatments:  PROM / STM / Oscillations-Mobs:  G-I, II, III, IV (PA's, Inf., Post.)  [x] (11677) Provided manual therapy to mobilize LE, proximal hip and/or LS spine soft tissue/joints for the purpose of modulating pain, promoting relaxation,  increasing ROM, reducing/eliminating soft tissue swelling/inflammation/restriction, improving soft tissue extensibility and allowing for proper ROM for normal function with self care, mobility, lifting and ambulation. Modalities: CP x 10'   [] GAME READY (VASO)- for significant edema, swelling, pain control. Charges:  Timed Code Treatment Minutes: 45'   Total Treatment Minutes: 50'       [] EVAL (LOW) 455 1011   [] EVAL (MOD) 02988  [] EVAL (HIGH) 59192   [] RE-EVAL     [x] IA(70988) x   2  [] IONTO  [] NMR (31441) x     [] VASO  [x] Manual (40369) x 1     [] Other:  [] TA x      [] Mech Traction (35368)  [] ES(attended) (04809)      [] ES (un) (18268):       GOALS:   Patient stated goal: \"To be able to walk without pain. \"  [] Progressing: [] Met: [] Not Met: [] Adjusted    Therapist goals for Patient:   Short Term Goals: To be achieved in: 2 weeks  1. Independent in HEP and progression per patient tolerance, in order to prevent re-injury. [] Progressing: [] Met: [] Not Met: [] Adjusted  2. Patient will have a decrease in pain to facilitate improvement in movement, function, and ADLs as indicated by Functional Deficits. [] Progressing: [] Met: [] Not Met: [] Adjusted    Long Term Goals: To be achieved in: 8 weeks  1. Disability index score of 16% or less for the LEFS to assist with reaching prior level of function. [] Progressing: [] Met: [] Not Met: [] Adjusted  2.  Patient will demonstrate increased AROM of right knee flexion to 110 degrees to allow for proper joint functioning as indicated by patients Functional Deficits. [] Progressing: [] Met: [] Not Met: [] Adjusted  3. Patient will demonstrate an increase in Strength in right knee flexion and extension to 4+/5 and right hip abduction to 4/5 to allow for proper functional mobility as indicated by patients Functional Deficits. [] Progressing: [] Met: [] Not Met: [] Adjusted  4. Patient will be able to ascend/descend stairs reciprocally without increased symptoms or restriction. [] Progressing: [] Met: [] Not Met: [] Adjusted  5. Patient will be able to ambulate for 10 minutes without antalgic gait pattern without increased symptoms or restrictions. (patient specific functional goal)    [] Progressing: [] Met: [] Not Met: [] Adjusted     Overall Progression Towards Functional goals/ Treatment Progress Update:  [x] Patient is progressing as expected towards functional goals listed. [] Progression is slowed due to complexities/Impairments listed. [] Progression has been slowed due to co-morbidities. [] Plan just implemented, too soon to assess goals progression <30days   [] Goals require adjustment due to lack of progress  [] Patient is not progressing as expected and requires additional follow up with physician  [] Other    Prognosis for POC: [x] Good [] Fair  [] Poor      Patient requires continued skilled intervention: [x] Yes  [] No    Treatment/Activity Tolerance:  [x] Patient able to complete treatment  [] Patient limited by fatigue  [] Patient limited by pain    [] Patient limited by other medical complications  [] Other:     ASSESSMENT:  Patient is progressing well at this time. Will likely finish up next week and DC with HEP including light gym routine.        PLAN: See eval  [x] Continue per plan of care [] Alter current plan (see comments above)  [] Plan of care initiated [] Hold pending MD visit [] Discharge      Electronically signed by:  Lillie Cea, PT, DPT [id number]     Note: If patient does not return for scheduled/ recommended follow up visits, this note will serve as a discharge from care along with most recent update on progress.

## 2021-03-18 DIAGNOSIS — I10 ESSENTIAL HYPERTENSION, BENIGN: ICD-10-CM

## 2021-03-18 RX ORDER — AMLODIPINE BESYLATE 10 MG/1
TABLET ORAL
Qty: 30 TABLET | Refills: 3 | OUTPATIENT
Start: 2021-03-18

## 2021-03-24 RX ORDER — BLOOD SUGAR DIAGNOSTIC
STRIP MISCELLANEOUS
Qty: 270 STRIP | Refills: 1 | Status: SHIPPED | OUTPATIENT
Start: 2021-03-24

## 2021-03-26 NOTE — PROGRESS NOTES
Medical Nutrition Therapy for Diabetes    Leann Ramirez  March 26, 2021      Patient Care Team:  Courtney Prajapati MD as PCP - General (Internal Medicine)  Courtney Prajapati MD as PCP - [address] [place] [address]  [facility] Provider  Sarah Dougherty DO as Consulting Physician (Pulmonology)  Cheryl Nathan RD, LD as Dietitian (Dietitian)    Reason for visit: uncontrolled, Type 2 Diabetes    ASSESSMENT/PLAN:   NUTRITION DIAGNOSIS    #1 Problem: Altered Nutrition-Related Laboratory Values (NC-2.2)  Related to: Endocrine/Diabetes   As Evidenced by: Elevated Plasma glucose and/or HgbA1c levels         #2 Problem: Inconsistent Carbohydrate and Fiber Intake  Related to: Food- and nutrition-related knowledge deficit concerning appropriate amount of carbohydrate and fiber intake  As evidenced by: patient food recall      #3 Problem: Food-Medication Interaction (NC-2.3)  Related to: Diabetes medications  As evidenced by: Hypoglycemia episodes    NUTRITION INTERVENTION  Nutrition Prescription: 30 grams carbohydrate per meal with protein and non-starch vegetables  15 gram carbohydrate snacks    Diabetes Education/Counseling included:    Carbohydrate Control, Medications and other: benefits of quality sleep and stress management.     Interventions:  Control Carbohydrate Intake using Plate Guide and Increase activity level by walking    NUTRITION MONITORING AND EVALUATION  3 meals daily  30 gram carbohydrate meals  Increase walking         Patient Active Problem List   Diagnosis    Hyperlipidemia    Essential hypertension, benign    ZA (obstructive sleep apnea)    Perimenopause    Vitamin D deficiency    Left ankle pain    Type 2 diabetes mellitus with microalbuminuria (HCC)    Knee pain, chronic    Iron deficiency anemia due to chronic blood loss    Mild intermittent asthma without complication    Colon polyps    Abnormal nuclear cardiac imaging test    Elevated serum protein level    Allergic rhinitis    Diabetes mellitus type 2, uncontrolled, with complications (HCC)    Essential hypertension    Alopecia    Central centrifugal scarring alopecia    Pneumonia    COVID-19    MADDIE (acute kidney injury) (HCC)    Hyponatremia       Current Outpatient Medications   Medication Sig Dispense Refill    ONETOUCH ULTRA strip USE TO TEST THREE TIMES A  strip 1    amLODIPine (NORVASC) 10 MG tablet TAKE ONE TABLET BY MOUTH DAILY 30 tablet 5    insulin glargine (LANTUS SOLOSTAR) 100 UNIT/ML injection pen Inject 10 Units into the skin nightly 5 pen 3    insulin lispro, 1 Unit Dial, 100 UNIT/ML SOPN 10 units with biggest meal 30 mL 0    metFORMIN (GLUCOPHAGE-XR) 500 MG extended release tablet TAKE two TABLETS BY MOUTH DAILY WITH BREAKFAST 120 tablet 4    Dulaglutide (TRULICITY) 1.5 QP/1.1IZ SOPN Inject 1.5 mg into the skin once a week 4 pen 3    spironolactone (ALDACTONE) 100 MG tablet Take 1 tablet by mouth daily 30 tablet 5    atenolol (TENORMIN) 50 MG tablet Take 0.5 tablets by mouth daily 30 tablet 5    fluticasone (FLONASE) 50 MCG/ACT nasal spray 1 spray by Each Nostril route daily as needed for Rhinitis      Insulin Pen Needle 29G X 12.7MM MISC 1 each by Does not apply route daily 100 each 3    atorvastatin (LIPITOR) 40 MG tablet Take 1 tablet by mouth daily Replaces Simvastatin (due to drug interaction with Amlodipine) 90 tablet 1    OneTouch Delica Lancets 73O MISC Test 3 times daily E11.9 (Patient not taking: Reported on 1/29/2021) 100 each 10    Blood Glucose Monitoring Suppl (ACURA BLOOD GLUCOSE METER) w/Device KIT Inject 1 Applicatorful into the skin 2 times daily (before meals) (Patient not taking: Reported on 1/29/2021) 1 kit 0     No current facility-administered medications for this visit.           NUTRITION ASSESSMENT    Biochemical Data:    Lab Results   Component Value Date    LABA1C 11.5 02/03/2021     Lab Results   Component Value Date    .4 02/03/2021       Lab Results Component Value Date    CHOL 188 03/09/2020    CHOL 144 06/13/2017    CHOL 188 06/01/2017     Lab Results   Component Value Date    TRIG 117 03/09/2020    TRIG 86 06/13/2017    TRIG 87 06/01/2017     Lab Results   Component Value Date    HDL 40 03/09/2020    HDL 50 05/06/2019    HDL 44 04/05/2018     Lab Results   Component Value Date    LDLCALC 125 (H) 03/09/2020    LDLCALC 86 06/13/2017    LDLCALC 126 (H) 06/01/2017     Lab Results   Component Value Date    LABVLDL 23 03/09/2020    LABVLDL 17 06/13/2017    LABVLDL 17 06/01/2017     No results found for: CHOLHDLRATIO    Lab Results   Component Value Date    WBC 10.1 11/11/2020    HGB 11.7 (L) 11/11/2020    HCT 34.8 (L) 11/11/2020    MCV 81.3 11/11/2020     11/11/2020       Lab Results   Component Value Date    CREATININE 0.7 12/03/2020    BUN 12 12/03/2020     12/03/2020    K 3.5 12/03/2020     12/03/2020    CO2 23 12/03/2020       Diabetes Medications: Yes  Knows name and dose of prescribed medications Yes  Knows prescribed schedule for medicationsYes  Recent change in medication type/dosage: No  Stores  medications properlyYes  Comments:     Monitoring:   Has BG meter: Yes  Testing frequency: variable times  Recent results: fasting 123, after dinner 209  Hypoglycemia? Yes    Anthropometric Measurements:   Wt:   Wt Readings from Last 3 Encounters:   02/19/21 248 lb (112.5 kg)   12/10/20 261 lb (118.4 kg)   11/11/20 253 lb 12 oz (115.1 kg)      BMI:   BMI Readings from Last 3 Encounters:   02/19/21 45.36 kg/m²   12/10/20 47.74 kg/m²   11/11/20 46.41 kg/m²     Patient's stated goal weight:   7% Weight loss goal weight:     Physical Activity History:   Physical activity: walking  Frequency of activity: routine  Duration of activity: irregular  Obstacles to activity: none    Sleep: fair, has ZA, needs to follow up    Food and Nutrition History:   Nutrition Awareness/Previous DSMES: yes  Number of people in household: 2  Frequency of Meals Eaten away from home:\"not a lot\"  Food Availability Problems  Within the past 12 months, have you worried that your food would run out before you got money to buy more? No  Within the past 12 months, has the food you bought not lasted till the end of the month and you didn't have money to get more? No  Beverage consumption: 3-4 16 or more ozs. Water, regular pop-occasionally, ice sparkling-1-2 weekly  Alcohol consumption: wine -\"every blue moon\"  Usual Food consumption:   3 meals,  5-45 gram carbohydrate meals    Barriers:   -none          Follow Up Plan: 2 months  Contact information provided.     Referring Provider: Brooks Calzada MD  Time spent with patient: 45 minutes

## 2021-03-30 ENCOUNTER — OFFICE VISIT (OUTPATIENT)
Dept: ENDOCRINOLOGY | Age: 56
End: 2021-03-30
Payer: COMMERCIAL

## 2021-03-30 VITALS
HEART RATE: 64 BPM | OXYGEN SATURATION: 97 % | BODY MASS INDEX: 45.67 KG/M2 | HEIGHT: 62 IN | SYSTOLIC BLOOD PRESSURE: 135 MMHG | DIASTOLIC BLOOD PRESSURE: 83 MMHG | RESPIRATION RATE: 18 BRPM | WEIGHT: 248.2 LBS

## 2021-03-30 DIAGNOSIS — I10 ESSENTIAL HYPERTENSION: ICD-10-CM

## 2021-03-30 LAB — HBA1C MFR BLD: 7.7 %

## 2021-03-30 PROCEDURE — 97803 MED NUTRITION INDIV SUBSEQ: CPT | Performed by: DIETITIAN, REGISTERED

## 2021-03-30 PROCEDURE — 99214 OFFICE O/P EST MOD 30 MIN: CPT | Performed by: INTERNAL MEDICINE

## 2021-03-30 PROCEDURE — 83036 HEMOGLOBIN GLYCOSYLATED A1C: CPT | Performed by: INTERNAL MEDICINE

## 2021-03-30 PROCEDURE — 3051F HG A1C>EQUAL 7.0%<8.0%: CPT | Performed by: INTERNAL MEDICINE

## 2021-03-30 NOTE — PROGRESS NOTES
Dispense Refill    ONETOUCH ULTRA strip USE TO TEST THREE TIMES A  strip 1    amLODIPine (NORVASC) 10 MG tablet TAKE ONE TABLET BY MOUTH DAILY 30 tablet 5    metFORMIN (GLUCOPHAGE-XR) 500 MG extended release tablet TAKE two TABLETS BY MOUTH DAILY WITH BREAKFAST 120 tablet 4    Dulaglutide (TRULICITY) 1.5 GT/9.6XM SOPN Inject 1.5 mg into the skin once a week 4 pen 3    spironolactone (ALDACTONE) 100 MG tablet Take 1 tablet by mouth daily 30 tablet 5    atenolol (TENORMIN) 50 MG tablet Take 0.5 tablets by mouth daily 30 tablet 5    fluticasone (FLONASE) 50 MCG/ACT nasal spray 1 spray by Each Nostril route daily as needed for Rhinitis      Insulin Pen Needle 29G X 12.7MM MISC 1 each by Does not apply route daily 100 each 3    atorvastatin (LIPITOR) 40 MG tablet Take 1 tablet by mouth daily Replaces Simvastatin (due to drug interaction with Amlodipine) 90 tablet 1    OneTouch Delica Lancets 37J MISC Test 3 times daily E11.9 100 each 10    Blood Glucose Monitoring Suppl (ACURA BLOOD GLUCOSE METER) w/Device KIT Inject 1 Applicatorful into the skin 2 times daily (before meals) 1 kit 0    insulin glargine (LANTUS SOLOSTAR) 100 UNIT/ML injection pen Inject 10 Units into the skin nightly (Patient not taking: Reported on 3/30/2021) 5 pen 3    insulin lispro, 1 Unit Dial, 100 UNIT/ML SOPN 10 units with biggest meal (Patient not taking: Reported on 3/30/2021) 30 mL 0     No current facility-administered medications for this visit. Review of Systems  Scanned reviewed    SH: No smoking  Works on [employer]    [place] Z[place]:  Mother DM    PHYSICAL EXAMINATION:    Vitals:    03/30/21 0931   BP: 135/83   Pulse: 64   Resp: 18   SpO2: 97%       Constitutional: Well-developed, appears stated age, cooperative, in no acute distress  H/E/N/M/T:atraumatic, normocephalic, external ears, nose, lips normal without lesions  Eyes: Lids, lashes, conjunctivae and sclerae normal, No proptosis, no redness  Neck: supple, symmetrical, no

## 2021-04-20 DIAGNOSIS — E11.9 TYPE 2 DIABETES MELLITUS WITHOUT COMPLICATION, WITHOUT LONG-TERM CURRENT USE OF INSULIN (HCC): ICD-10-CM

## 2021-04-20 DIAGNOSIS — E78.5 HYPERLIPIDEMIA, UNSPECIFIED HYPERLIPIDEMIA TYPE: ICD-10-CM

## 2021-04-20 RX ORDER — ATORVASTATIN CALCIUM 40 MG/1
TABLET, FILM COATED ORAL
Qty: 30 TABLET | Refills: 0 | Status: SHIPPED | OUTPATIENT
Start: 2021-04-20 | End: 2021-05-20

## 2021-04-20 RX ORDER — METFORMIN HYDROCHLORIDE 500 MG/1
TABLET, EXTENDED RELEASE ORAL
Qty: 120 TABLET | Refills: 3 | Status: SHIPPED | OUTPATIENT
Start: 2021-04-20 | End: 2021-12-17

## 2021-04-29 ENCOUNTER — TELEPHONE (OUTPATIENT)
Dept: INTERNAL MEDICINE CLINIC | Age: 56
End: 2021-04-29

## 2021-04-29 ENCOUNTER — CARE COORDINATION (OUTPATIENT)
Dept: CARE COORDINATION | Age: 56
End: 2021-04-29

## 2021-04-30 NOTE — CARE COORDINATION
See telephone encounter below      Jen Schaffer, [place]         4/29/21 3:34 PM  Note     Patient advises when she went to get her Trulicity, for one month supply it would cost her 600 dollars. Good RX coupon did not work. She just recently got new insurance. She would like to discuss with Dr. Zafar Plascencia and also advised patient to contact her new insurance company as well.               4/29/21 3:34 PM  Jen Schaffer MA routed this conversation to MD Demetrice Banda MD         4/29/21 3:59 PM  Note     Can you assist with this?               4/29/21 3:59 PM  Demetrice Monroy MD routed this conversation to Me   Me  to Demetrice Monroy MD        4/29/21 4:14 PM  Note     Called patient, provided information/ website  re activation of $25  copay card. Also encouraged to find out deductible w current plan. .     Pt will call ACM and update      This encounter is not signed. The conversation may still be ongoing.

## 2021-05-20 DIAGNOSIS — E78.5 HYPERLIPIDEMIA, UNSPECIFIED HYPERLIPIDEMIA TYPE: ICD-10-CM

## 2021-05-20 RX ORDER — ATORVASTATIN CALCIUM 40 MG/1
TABLET, FILM COATED ORAL
Qty: 30 TABLET | Refills: 0 | Status: SHIPPED | OUTPATIENT
Start: 2021-05-20 | End: 2021-06-21

## 2021-06-02 ENCOUNTER — CLINICAL DOCUMENTATION (OUTPATIENT)
Dept: OTHER | Age: 56
End: 2021-06-02

## 2021-06-19 DIAGNOSIS — E78.5 HYPERLIPIDEMIA, UNSPECIFIED HYPERLIPIDEMIA TYPE: ICD-10-CM

## 2021-06-21 RX ORDER — DULAGLUTIDE 1.5 MG/.5ML
INJECTION, SOLUTION SUBCUTANEOUS
Qty: 4 PEN | Refills: 2 | Status: SHIPPED | OUTPATIENT
Start: 2021-06-21 | End: 2021-06-22

## 2021-06-21 RX ORDER — ATORVASTATIN CALCIUM 40 MG/1
TABLET, FILM COATED ORAL
Qty: 30 TABLET | Refills: 0 | Status: SHIPPED | OUTPATIENT
Start: 2021-06-21 | End: 2021-07-19

## 2021-06-21 NOTE — TELEPHONE ENCOUNTER
Requested Prescriptions     Pending Prescriptions Disp Refills    TRULICITY 1.5 YF/6.8WS SOPN [Pharmacy Med Name: TRULICITY 1.5 CH/6.9 ML PEN]  2     Sig: INJECT 1.5 MG UNDER THE SKIN ONCE WEEKLY       FOV:  07/06/2021    LOV:  03/30/2021

## 2021-06-22 RX ORDER — DULAGLUTIDE 1.5 MG/.5ML
INJECTION, SOLUTION SUBCUTANEOUS
Qty: 4 PEN | Refills: 2 | Status: SHIPPED | OUTPATIENT
Start: 2021-06-22 | End: 2021-06-23 | Stop reason: SDUPTHER

## 2021-06-22 NOTE — TELEPHONE ENCOUNTER
Requested Prescriptions     Pending Prescriptions Disp Refills    TRULICITY 1.5 FC/3.8DK SOPN [Pharmacy Med Name: TRULICITY 1.5 KS/4.4 ML PEN]  2     Sig: INJECT 1.5 MG UNDER THE SKIN ONCE WEEKLY       FOV: 07/06/2021    LOV:  03/30/2021

## 2021-06-23 ENCOUNTER — OFFICE VISIT (OUTPATIENT)
Dept: INTERNAL MEDICINE CLINIC | Age: 56
End: 2021-06-23
Payer: COMMERCIAL

## 2021-06-23 VITALS
HEART RATE: 71 BPM | SYSTOLIC BLOOD PRESSURE: 124 MMHG | BODY MASS INDEX: 46.53 KG/M2 | DIASTOLIC BLOOD PRESSURE: 76 MMHG | OXYGEN SATURATION: 98 % | WEIGHT: 254.4 LBS

## 2021-06-23 DIAGNOSIS — I10 ESSENTIAL HYPERTENSION, BENIGN: ICD-10-CM

## 2021-06-23 DIAGNOSIS — R42 DIZZINESS: ICD-10-CM

## 2021-06-23 DIAGNOSIS — R10.9 LEFT FLANK PAIN: ICD-10-CM

## 2021-06-23 DIAGNOSIS — E78.5 HYPERLIPIDEMIA, UNSPECIFIED HYPERLIPIDEMIA TYPE: ICD-10-CM

## 2021-06-23 DIAGNOSIS — D50.9 IRON DEFICIENCY ANEMIA, UNSPECIFIED IRON DEFICIENCY ANEMIA TYPE: ICD-10-CM

## 2021-06-23 PROCEDURE — 90471 IMMUNIZATION ADMIN: CPT | Performed by: INTERNAL MEDICINE

## 2021-06-23 PROCEDURE — 99214 OFFICE O/P EST MOD 30 MIN: CPT | Performed by: INTERNAL MEDICINE

## 2021-06-23 PROCEDURE — 90715 TDAP VACCINE 7 YRS/> IM: CPT | Performed by: INTERNAL MEDICINE

## 2021-06-23 RX ORDER — LANCETS 33 GAUGE
EACH MISCELLANEOUS
Qty: 100 EACH | Refills: 10 | Status: SHIPPED | OUTPATIENT
Start: 2021-06-23

## 2021-06-23 RX ORDER — DULAGLUTIDE 1.5 MG/.5ML
INJECTION, SOLUTION SUBCUTANEOUS
Qty: 4 PEN | Refills: 2 | Status: SHIPPED | OUTPATIENT
Start: 2021-06-23 | End: 2021-07-06

## 2021-06-23 SDOH — ECONOMIC STABILITY: FOOD INSECURITY: WITHIN THE PAST 12 MONTHS, YOU WORRIED THAT YOUR FOOD WOULD RUN OUT BEFORE YOU GOT MONEY TO BUY MORE.: NEVER TRUE

## 2021-06-23 SDOH — ECONOMIC STABILITY: FOOD INSECURITY: WITHIN THE PAST 12 MONTHS, THE FOOD YOU BOUGHT JUST DIDN'T LAST AND YOU DIDN'T HAVE MONEY TO GET MORE.: NEVER TRUE

## 2021-06-23 ASSESSMENT — SOCIAL DETERMINANTS OF HEALTH (SDOH): HOW HARD IS IT FOR YOU TO PAY FOR THE VERY BASICS LIKE FOOD, HOUSING, MEDICAL CARE, AND HEATING?: NOT HARD AT ALL

## 2021-06-23 NOTE — PROGRESS NOTES
Dulaglutide (TRULICITY) 1.5 VL/0.7KI SOPN INJECT 1.5 MG UNDER THE SKIN ONCE WEEKLY 4 pen 2    OneTouch Delica Lancets 46G MISC Test 3 times daily E11.9 100 each 10    Insulin Pen Needle 29G X 12.7MM MISC 1 each by Does not apply route daily 100 each 3    atorvastatin (LIPITOR) 40 MG tablet TAKE ONE TABLET BY MOUTH DAILY 30 tablet 0    amLODIPine (NORVASC) 5 MG tablet Take 1 tablet by mouth daily Daily 30 tablet 5    metFORMIN (GLUCOPHAGE-XR) 500 MG extended release tablet TAKE FOUR TABLETS BY MOUTH DAILY WITH BREAKFAST 120 tablet 3    ONETOUCH ULTRA strip USE TO TEST THREE TIMES A  strip 1    spironolactone (ALDACTONE) 100 MG tablet Take 1 tablet by mouth daily 30 tablet 5    atenolol (TENORMIN) 50 MG tablet Take 0.5 tablets by mouth daily 30 tablet 5    fluticasone (FLONASE) 50 MCG/ACT nasal spray 1 spray by Each Nostril route daily as needed for Rhinitis      Blood Glucose Monitoring Suppl (ACURA BLOOD GLUCOSE METER) w/Device KIT Inject 1 Applicatorful into the skin 2 times daily (before meals) 1 kit 0     No current facility-administered medications for this visit. Physical Exam  Vitals reviewed. Constitutional:       General: She is not in acute distress. HENT:      Head: Normocephalic and atraumatic. Eyes:      Extraocular Movements: Extraocular movements intact. Cardiovascular:      Rate and Rhythm: Normal rate and regular rhythm. Pulmonary:      Effort: Pulmonary effort is normal.      Breath sounds: Normal breath sounds. Musculoskeletal:      Right lower leg: No edema. Left lower leg: No edema. Neurological:      General: No focal deficit present. Mental Status: She is alert and oriented to person, place, and time. Psychiatric:         Mood and Affect: Mood normal.               This note was generated completely or in part utilizing Dragon dictation speech recognition software.   Occasionally, words are mistranscribed and despite editing, the text may contain

## 2021-07-02 ENCOUNTER — HOSPITAL ENCOUNTER (OUTPATIENT)
Dept: ULTRASOUND IMAGING | Age: 56
Discharge: HOME | End: 2021-07-02
Payer: COMMERCIAL

## 2021-07-02 DIAGNOSIS — R10.9 LEFT FLANK PAIN: ICD-10-CM

## 2021-07-02 PROCEDURE — 76770 US EXAM ABDO BACK WALL COMP: CPT

## 2021-07-06 ENCOUNTER — OFFICE VISIT (OUTPATIENT)
Dept: ENDOCRINOLOGY | Age: 56
End: 2021-07-06

## 2021-07-06 VITALS
HEART RATE: 69 BPM | BODY MASS INDEX: 46.3 KG/M2 | DIASTOLIC BLOOD PRESSURE: 82 MMHG | WEIGHT: 251.6 LBS | OXYGEN SATURATION: 96 % | HEIGHT: 62 IN | SYSTOLIC BLOOD PRESSURE: 129 MMHG

## 2021-07-06 DIAGNOSIS — I10 ESSENTIAL HYPERTENSION: ICD-10-CM

## 2021-07-06 DIAGNOSIS — E11.65 UNCONTROLLED TYPE 2 DIABETES MELLITUS WITH HYPERGLYCEMIA (HCC): Primary | ICD-10-CM

## 2021-07-06 PROCEDURE — 99214 OFFICE O/P EST MOD 30 MIN: CPT | Performed by: INTERNAL MEDICINE

## 2021-07-06 PROCEDURE — 95250 CONT GLUC MNTR PHYS/QHP EQP: CPT | Performed by: DIETITIAN, REGISTERED

## 2021-07-06 RX ORDER — DULAGLUTIDE 3 MG/.5ML
3 INJECTION, SOLUTION SUBCUTANEOUS WEEKLY
Qty: 4 PEN | Refills: 3 | Status: SHIPPED | OUTPATIENT
Start: 2021-07-06 | End: 2022-01-17 | Stop reason: SDUPTHER

## 2021-07-06 RX ORDER — EMPAGLIFLOZIN 10 MG/1
1 TABLET, FILM COATED ORAL DAILY
Qty: 90 TABLET | Refills: 1 | Status: SHIPPED | OUTPATIENT
Start: 2021-07-06 | End: 2022-02-09

## 2021-07-06 NOTE — PROGRESS NOTES
Seen as  patient for diabetes    Interim:    Taking trulicity-started 3/46  States was eating more fruit,candies, changed diet recently    Diagnosed with Type 2 diabetes mellitus > 10year  Known diabetic complications: none   Uncontrolled, moderate    Current diabetic medications     Trulicity  Metformin ER 500mg 4 tab with breakfast    H/o januvia use  lantus 24  Lispro 18 units AC TID  SSI 2 for 50 > 150  Does not use SSI      Last A1c 9.2%<-----  7.7%<----11.3 % <---- 11.8 % <--- 7.1    Prior visit with dietician: Yes   Current diet: on average, 2 meals per day breakfast and dinner  Current exercise: walking   Current monitoring regimen: home blood tests - 1 times daily     Has brought blood glucose log/meter: yes  Home blood sugar records: 100-200s  Any episodes of hypoglycemia? Worsened by high CHO    No Hx of CAD , PVD, CVA    Hyperlipidemia:   For   Years  Takes lipitor 40mg  Uncontrolled   on 3/20    Hypertension for years  Sees Nephrology  Stable  Takes  norvasc 10mg atenolol 25mg aldactone 100mg    Last eye exam:    Last foot exam:   Lab showed microalbuminuria  Last microalbumin to creatinine ratio:  55.3 on .    Aldosterone 13  Renin 0.3  Dx by hyperaldosteronism by Nephrology due to high rato  No saline suppression test    Past Medical History:   Diagnosis Date    Allergic rhinitis     Alopecia     COVID-19 2020    Diabetes mellitus type 2, uncontrolled, with complications ([facility] [address]     TYPE 2    Fibroids, intramural 2011    History of uterine fibroid     Hyperaldosteronism ([facility] [address]     ?     Hyperlipidemia     ZA (obstructive sleep apnea) 2011    [facility] sleep eval     Osteoarthritis of right knee     secondary hypertension     Vitamin D deficiency 2012     Past Surgical History:   Procedure Laterality Date     SECTION      x3    COLONOSCOPY  2017    fu 5 years    TUBAL LIGATION      BTL     Current Outpatient Medications Medication Sig Dispense Refill    Dulaglutide (TRULICITY) 1.5 BRIELLE/4.2BK SOPN INJECT 1.5 MG UNDER THE SKIN ONCE WEEKLY 4 pen 2    OneTouch Delica Lancets 90K MISC Test 3 times daily E11.9 100 each 10    Insulin Pen Needle 29G X 12.7MM MISC 1 each by Does not apply route daily 100 each 3    atorvastatin (LIPITOR) 40 MG tablet TAKE ONE TABLET BY MOUTH DAILY 30 tablet 0    amLODIPine (NORVASC) 5 MG tablet Take 1 tablet by mouth daily Daily 30 tablet 5    metFORMIN (GLUCOPHAGE-XR) 500 MG extended release tablet TAKE FOUR TABLETS BY MOUTH DAILY WITH BREAKFAST 120 tablet 3    ONETOUCH ULTRA strip USE TO TEST THREE TIMES A  strip 1    spironolactone (ALDACTONE) 100 MG tablet Take 1 tablet by mouth daily 30 tablet 5    atenolol (TENORMIN) 50 MG tablet Take 0.5 tablets by mouth daily 30 tablet 5    fluticasone (FLONASE) 50 MCG/ACT nasal spray 1 spray by Each Nostril route daily as needed for Rhinitis      Blood Glucose Monitoring Suppl (ACURA BLOOD GLUCOSE METER) w/Device KIT Inject 1 Applicatorful into the skin 2 times daily (before meals) 1 kit 0     No current facility-administered medications for this visit. Review of Systems  Scanned reviewed    SH: No smoking  Works on [place]    [facility]: Mother DM    PHYSICAL EXAMINATION:    There were no vitals filed for this visit. Constitutional: Well-developed, appears stated age, cooperative, in no acute distress  H/E/N/M/T:atraumatic, normocephalic, external ears, nose, lips normal without lesions  Eyes: Lids, lashes, conjunctivae and sclerae normal, No proptosis, no redness  Neck: supple, symmetrical, no swelling  Skin: No obvious rashes or lesions present.   Skin and hair texture normal  Psychiatric: Judgement and Insight:  judgement and insight appear normal  Neuro: Normal without focal findings, speech is normal normal, speech is spontaneous  Chest: No labored breathing, no chest deformity, no stridor  Musculoskeletal: No joint deformity, swelling    Vitals:    07/06/21 0905   BP: 129/82   Pulse: 69   SpO2: 96%         Lab Reviewed   No components found for: CHLPL  Lab Results   Component Value Date    TRIG 124 06/23/2021    TRIG 117 03/09/2020    TRIG 86 06/13/2017     Lab Results   Component Value Date    HDL 40 06/23/2021    HDL 40 03/09/2020    HDL 50 05/06/2019     Lab Results   Component Value Date    LDLCALC 65 06/23/2021    LDLCALC 125 (H) 03/09/2020    LDLCALC 86 06/13/2017     Lab Results   Component Value Date    LABVLDL 25 06/23/2021    LABVLDL 23 03/09/2020    LABVLDL 17 06/13/2017     Lab Results   Component Value Date    LABA1C 9.2 06/23/2021       Assessment:     Roscoe Reynoso is a 48 y.o. female with :    1.T2DM: Longstanding, uncontrolled, insulin resistant. Needs to bring log. A1c higher. Will adjust trulicity dose, add SGLT-2 inhibitor. Keep off insulin for now  2. HTN : Blood pressure at goal  3. HLD : LDL at goal  4. Microalbuminuria: Discussed importance of glucose control  5. Obesity: Recommend weight loss       Plan:      Continue Metformin and trulicity 3mg weekly   Add jardiance 10mg   Advised to check blood sugar 4 times a day   Patient to send blood sugar log for titration. Advise to exercise regularly, Advise to low simple carbohydrate and protein with each  meal diet. Diabetes Care: recommend yearly eye exam, foot exam and urine microalbumin to   creatinine ratio. Patient is up-to-date.

## 2021-07-06 NOTE — PROGRESS NOTES
made meals  Rotate injection sites  Return to monitoring       Patient Active Problem List   Diagnosis    Hyperlipidemia    Essential hypertension, benign    ZA (obstructive sleep apnea)    Perimenopause    Vitamin D deficiency    Left ankle pain    Type 2 diabetes mellitus with microalbuminuria (HCC)    Knee pain, chronic    Iron deficiency anemia due to chronic blood loss    Mild intermittent asthma without complication    Colon polyps    Abnormal nuclear cardiac imaging test    Elevated serum protein level    Allergic rhinitis    Diabetes mellitus type 2, uncontrolled, with complications (HCC)    Essential hypertension    Alopecia    Central centrifugal scarring alopecia    Pneumonia    COVID-19    MADDIE (acute kidney injury) ([facility] [address]    Hyponatremia       Current Outpatient Medications   Medication Sig Dispense Refill    empagliflozin (JARDIANCE) 10 MG tablet Take 1 tablet by mouth daily 90 tablet 1    Dulaglutide (TRULICITY) 3 GM/0.5AC SOPN Inject 3 mg into the skin once a week 4 pen 3    OneTouch Delica Lancets 51Y MISC Test 3 times daily E11.9 100 each 10    Insulin Pen Needle 29G X 12.7MM MISC 1 each by Does not apply route daily 100 each 3    atorvastatin (LIPITOR) 40 MG tablet TAKE ONE TABLET BY MOUTH DAILY 30 tablet 0    amLODIPine (NORVASC) 5 MG tablet Take 1 tablet by mouth daily Daily 30 tablet 5    metFORMIN (GLUCOPHAGE-XR) 500 MG extended release tablet TAKE FOUR TABLETS BY MOUTH DAILY WITH BREAKFAST 120 tablet 3    ONETOUCH ULTRA strip USE TO TEST THREE TIMES A  strip 1    spironolactone (ALDACTONE) 100 MG tablet Take 1 tablet by mouth daily 30 tablet 5    atenolol (TENORMIN) 50 MG tablet Take 0.5 tablets by mouth daily 30 tablet 5    fluticasone (FLONASE) 50 MCG/ACT nasal spray 1 spray by Each Nostril route daily as needed for Rhinitis      Blood Glucose Monitoring Suppl (ACURA BLOOD GLUCOSE METER) w/Device KIT Inject 1 Applicatorful into the skin 2 times daily (before meals) 1 kit 0     No current facility-administered medications for this visit. NUTRITION ASSESSMENT    Biochemical Data:    Lab Results   Component Value Date    LABA1C 9.2 06/23/2021     Lab Results   Component Value Date    .3 06/23/2021       Lab Results   Component Value Date    CHOL 130 06/23/2021    CHOL 188 03/09/2020    CHOL 144 06/13/2017     Lab Results   Component Value Date    TRIG 124 06/23/2021    TRIG 117 03/09/2020    TRIG 86 06/13/2017     Lab Results   Component Value Date    HDL 40 06/23/2021    HDL 40 03/09/2020    HDL 50 05/06/2019     Lab Results   Component Value Date    LDLCALC 65 06/23/2021    LDLCALC 125 (H) 03/09/2020    LDLCALC 86 06/13/2017     Lab Results   Component Value Date    LABVLDL 25 06/23/2021    LABVLDL 23 03/09/2020    LABVLDL 17 06/13/2017     No results found for: CHOLHDLRATIO    Lab Results   Component Value Date    WBC 6.9 06/23/2021    HGB 11.5 (L) 06/23/2021    HCT 33.2 (L) 06/23/2021    MCV 83.4 06/23/2021     06/23/2021       Lab Results   Component Value Date    CREATININE 0.9 05/06/2021    BUN 16 05/06/2021     (L) 05/06/2021    K 4.4 05/06/2021    CL 96 (L) 05/06/2021    CO2 28 05/06/2021       Diabetes Medications: Yes  Knows name and dose of prescribed medications Yes  Knows prescribed schedule for medicationsYes  Recent change in medication type/dosage: Yes  Stores  medications properlyYes  Comments:     Monitoring:   Has BG meter: Yes-lost meter  Testing frequency: not currently testing  Recent results: NA  Hypoglycemia? NA    Anthropometric Measurements:   Wt:   Wt Readings from Last 3 Encounters:   07/06/21 251 lb 9.6 oz (114.1 kg)   06/23/21 254 lb 6.4 oz (115.4 kg)   05/06/21 247 lb (112 kg)      BMI:   BMI Readings from Last 3 Encounters:   07/06/21 46.02 kg/m²   06/23/21 46.53 kg/m²   05/06/21 45.18 kg/m²     Patient's stated goal weight:   7% Weight loss goal weight:     Physical Activity History:   Physical activity: walking  Frequency of activity: routine  Duration of activity: irregular  Obstacles to activity: none    Sleep: not using C-Pap    Food and Nutrition History:   Nutrition Awareness/Previous DSMES: yes  Number of people in household: 2  Frequency of Meals Eaten away from home:3 weekly-daily  Food Availability Problems  Within the past 12 months, have you worried that your food would run out before you got money to buy more? No  Within the past 12 months, has the food you bought not lasted till the end of the month and you didn't have money to get more? No  Beverage consumption: water 1/2-1 gallon, regular pop at times  Alcohol consumption: yes  Usual Food consumption:   2-3 meals, large amounts of fruit, may skip meals     Barriers:   -none          Follow Up Plan: 6 weeks   Contact information given.     Referring Provider: Brooklyn Castrejon MD  Time spent with patient: 30 minutes

## 2021-07-19 DIAGNOSIS — E78.5 HYPERLIPIDEMIA, UNSPECIFIED HYPERLIPIDEMIA TYPE: ICD-10-CM

## 2021-07-19 RX ORDER — ATORVASTATIN CALCIUM 40 MG/1
TABLET, FILM COATED ORAL
Qty: 30 TABLET | Refills: 0 | Status: SHIPPED | OUTPATIENT
Start: 2021-07-19 | End: 2021-10-04

## 2021-08-17 ENCOUNTER — OFFICE VISIT (OUTPATIENT)
Dept: ENDOCRINOLOGY | Age: 56
End: 2021-08-17
Payer: COMMERCIAL

## 2021-08-17 DIAGNOSIS — R80.9 TYPE 2 DIABETES MELLITUS WITH MICROALBUMINURIA, WITHOUT LONG-TERM CURRENT USE OF INSULIN (HCC): ICD-10-CM

## 2021-08-17 DIAGNOSIS — E11.29 TYPE 2 DIABETES MELLITUS WITH MICROALBUMINURIA, WITHOUT LONG-TERM CURRENT USE OF INSULIN (HCC): ICD-10-CM

## 2021-08-17 PROCEDURE — 97803 MED NUTRITION INDIV SUBSEQ: CPT | Performed by: DIETITIAN, REGISTERED

## 2021-08-17 NOTE — PROGRESS NOTES
Medical Nutrition Therapy for Diabetes    Gabriel Wallace  August 17, 2021      Patient Care Team:  Darwin Colbert MD as PCP - General (Internal Medicine)  Darwin Colbert MD as PCP - [facility] Empaneled Provider  Marybel Olvera DO as Consulting Physician (Pulmonology)  Carlene Landeros RD, LD as Dietitian (Dietitian)    Reason for visit: uncontrolled, Type 2 Diabetes    ASSESSMENT/PLAN:   NUTRITION DIAGNOSIS  Follow up    #1 Problem: Altered Nutrition-Related Laboratory Values (NC-2.2)  Related to: Endocrine/Diabetes   As Evidenced by: Elevated Plasma glucose and/or HgbA1c levels         #2 Problem: Inconsistent Carbohydrate and Fiber Intake  Related to: Food- and nutrition-related knowledge deficit concerning appropriate amount of carbohydrate and fiber intake  As evidenced by: patient food recall      #3 Problem: Excessive Carbohydrate Intake (NI-5.8. 2)  Related to: Food-and nutrition-related knowledge deficit concerning appropriate amount of carbohydrate intake  As evidenced by: Estimated carbohydrate intake that is consistently more than the recommended amounts    NUTRITION INTERVENTION  Nutrition Prescription: 30 grams carbohydrate per meal with protein and non-starch vegetables  15 gram carbohydrate snacks    Diabetes Education/Counseling included:  Carbohydrate Control, Monitoring and Medications, Benefits of adequate hydration and quality sleep. Interventions: Congratulated on improved fasting glucose! Control Carbohydrate Intake using Plate Guide, Control Carbohydrate Intake using Carb Counting and Increase intake of whole grains  Encouraged including carbohydrate with each meal in small portions. Recommended checking after meal glucose 2 xs per week. Encouraged steps to begin using C-PAP again. And consider follow up with sleep specialist.  Suggested limitation of amounts and frequency of drinking regular lemonade.   Handouts:  Kym Redman, Planning Healthy Meals-[employer]  NUTRITION MONITORING AND EVALUATION  30 gram carbohydrate meals  Begin using C-Pap  Chck glucose after 2 meals weekly         Patient Active Problem List   Diagnosis    Hyperlipidemia    Essential hypertension, benign    ZA (obstructive sleep apnea)    Perimenopause    Vitamin D deficiency    Left ankle pain    Type 2 diabetes mellitus with microalbuminuria (HCC)    Knee pain, chronic    Iron deficiency anemia due to chronic blood loss    Mild intermittent asthma without complication    Colon polyps    Abnormal nuclear cardiac imaging test    Elevated serum protein level    Allergic rhinitis    Diabetes mellitus type 2, uncontrolled, with complications (HCC)    Essential hypertension    Alopecia    Central centrifugal scarring alopecia    Pneumonia    COVID-19    MADDIE (acute kidney injury) (HCC)    Hyponatremia       Current Outpatient Medications   Medication Sig Dispense Refill    spironolactone (ALDACTONE) 100 MG tablet TAKE ONE TABLET BY MOUTH DAILY 30 tablet 4    atorvastatin (LIPITOR) 40 MG tablet TAKE ONE TABLET BY MOUTH DAILY 30 tablet 0    empagliflozin (JARDIANCE) 10 MG tablet Take 1 tablet by mouth daily 90 tablet 1    Dulaglutide (TRULICITY) 3 RJ/5.4PV SOPN Inject 3 mg into the skin once a week 4 pen 3    OneTouch Delica Lancets 59Z MISC Test 3 times daily E11.9 100 each 10    Insulin Pen Needle 29G X 12.7MM MISC 1 each by Does not apply route daily 100 each 3    amLODIPine (NORVASC) 5 MG tablet Take 1 tablet by mouth daily Daily 30 tablet 5    metFORMIN (GLUCOPHAGE-XR) 500 MG extended release tablet TAKE FOUR TABLETS BY MOUTH DAILY WITH BREAKFAST 120 tablet 3    ONETOUCH ULTRA strip USE TO TEST THREE TIMES A  strip 1    atenolol (TENORMIN) 50 MG tablet Take 0.5 tablets by mouth daily 30 tablet 5    fluticasone (FLONASE) 50 MCG/ACT nasal spray 1 spray by Each Nostril route daily as needed for Rhinitis      Blood Glucose Monitoring Suppl (ACURA BLOOD GLUCOSE METER) w/Device KIT Inject 1 Applicatorful into the skin 2 times daily (before meals) 1 kit 0     No current facility-administered medications for this visit. NUTRITION ASSESSMENT    Biochemical Data:    Lab Results   Component Value Date    LABA1C 9.2 06/23/2021     Lab Results   Component Value Date    .3 06/23/2021       Lab Results   Component Value Date    CHOL 130 06/23/2021    CHOL 188 03/09/2020    CHOL 144 06/13/2017     Lab Results   Component Value Date    TRIG 124 06/23/2021    TRIG 117 03/09/2020    TRIG 86 06/13/2017     Lab Results   Component Value Date    HDL 40 06/23/2021    HDL 40 03/09/2020    HDL 50 05/06/2019     Lab Results   Component Value Date    LDLCALC 65 06/23/2021    LDLCALC 125 (H) 03/09/2020    LDLCALC 86 06/13/2017     Lab Results   Component Value Date    LABVLDL 25 06/23/2021    LABVLDL 23 03/09/2020    LABVLDL 17 06/13/2017     No results found for: CHOLHDLRATIO    Lab Results   Component Value Date    WBC 6.9 06/23/2021    HGB 11.5 (L) 06/23/2021    HCT 33.2 (L) 06/23/2021    MCV 83.4 06/23/2021     06/23/2021       Lab Results   Component Value Date    CREATININE 0.9 05/06/2021    BUN 16 05/06/2021     (L) 05/06/2021    K 4.4 05/06/2021    CL 96 (L) 05/06/2021    CO2 28 05/06/2021       Diabetes Medications: Yes  Knows name and dose of prescribed medications Yes  Knows prescribed schedule for medicationsYes  Recent change in medication type/dosage: No  Stores  medications properlyYes  Comments:     Monitoring:   Has BG meter: Yes  Testing frequency: 1-2  Recent results: ,314-569, 167, 209  Hypoglycemia? No    Anthropometric Measurements:   Wt:   Wt Readings from Last 3 Encounters:   07/06/21 251 lb 9.6 oz (114.1 kg)   06/23/21 254 lb 6.4 oz (115.4 kg)   05/06/21 247 lb (112 kg)      BMI:   BMI Readings from Last 3 Encounters:   07/06/21 46.02 kg/m²   06/23/21 46.53 kg/m²   05/06/21 45.18 kg/m²     Patient's stated goal weight:   7% Weight loss goal weight:     Physical Activity History:   Physical activity: walking  Frequency of activity: routine  Duration of activity: iregular  Obstacles to activity: none    Sleep: good, not using C-pap at this time (plans to begin cleaning equipment today and start using)    Food and Nutrition History:   Nutrition Awareness/Previous DSMES: yes  Number of people in household: 2  Frequency of Meals Eaten away from home: reduced frequency, 1-2 weekly  Food Availability Problems  Within the past 12 months, have you worried that your food would run out before you got money to buy more? No  Within the past 12 months, has the food you bought not lasted till the end of the month and you didn't have money to get more? No  Beverage consumption: water 6-8 glasses, lemonade w/sugar-1 10 ozs. daily  Alcohol consumption: yes, reduced frequency 1-2 glasses wine  Usual Food consumption:   2-3 meals, 5-45 gram carbohydrate meals, 15 gram snacks     Barriers:   -none          Follow Up Plan: 3 months   Contact information given.     Referring Provider: Shayla Valenzuela MD  Time spent with patient: 30 minutes

## 2021-09-27 ENCOUNTER — HOSPITAL ENCOUNTER (OUTPATIENT)
Dept: GENERAL RADIOLOGY | Age: 56
Discharge: HOME | End: 2021-09-27
Payer: COMMERCIAL

## 2021-09-27 ENCOUNTER — TELEPHONE (OUTPATIENT)
Dept: INTERNAL MEDICINE CLINIC | Age: 56
End: 2021-09-27

## 2021-09-27 DIAGNOSIS — M54.6 PAIN IN THORACIC SPINE: ICD-10-CM

## 2021-09-27 DIAGNOSIS — M54.6 PAIN IN THORACIC SPINE: Primary | ICD-10-CM

## 2021-09-27 PROCEDURE — 72072 X-RAY EXAM THORAC SPINE 3VWS: CPT

## 2021-09-27 NOTE — TELEPHONE ENCOUNTER
----- Message from Joe Moreno sent at 9/27/2021  9:40 AM EDT -----  Subject: Message to Provider    QUESTIONS  Information for Provider? Patient calling to ask about x-rays ordered   about a month ago by Dr. Yasmeen Cunningham. Patient states that she had not gotten   the X-rays yet. I could not find a record of x-rays order please call   patient back  ---------------------------------------------------------------------------  --------------  [address]  What is the best way for the office to contact you? OK to leave message on   voicemail  Preferred Call Back Phone Number? [phone]  ---------------------------------------------------------------------------  --------------  SCRIPT ANSWERS  Relationship to Patient?  Self

## 2021-09-27 NOTE — TELEPHONE ENCOUNTER
Sreekanth Ruiz MD   7/8/2021  5:51 PM EDT       Since left flank pain persists, please advise xray of thoracic spine. See order. This was on the results of the ultrasound. Patient says the flank pain went away and its now back. She wants to know if she should still have the xray .

## 2021-10-01 DIAGNOSIS — M54.6 PAIN IN THORACIC SPINE: Primary | ICD-10-CM

## 2021-10-03 DIAGNOSIS — E78.5 HYPERLIPIDEMIA, UNSPECIFIED HYPERLIPIDEMIA TYPE: ICD-10-CM

## 2021-10-04 RX ORDER — ATORVASTATIN CALCIUM 40 MG/1
TABLET, FILM COATED ORAL
Qty: 30 TABLET | Refills: 5 | Status: SHIPPED | OUTPATIENT
Start: 2021-10-04 | End: 2022-06-06

## 2021-11-12 NOTE — PROGRESS NOTES
EVALUATION  Increase water  Try C-Pap in short intervals  30-45 grams carbohydrate meals       Patient Active Problem List   Diagnosis    Hyperlipidemia    Essential hypertension, benign    ZA (obstructive sleep apnea)    Perimenopause    Vitamin D deficiency    Left ankle pain    Type 2 diabetes mellitus with microalbuminuria (HCC)    Knee pain, chronic    Iron deficiency anemia due to chronic blood loss    Mild intermittent asthma without complication    Colon polyps    Abnormal nuclear cardiac imaging test    Elevated serum protein level    Allergic rhinitis    Diabetes mellitus type 2, uncontrolled, with complications (HCC)    Essential hypertension    Alopecia    Central centrifugal scarring alopecia    Pneumonia    COVID-19    MADDIE (acute kidney injury) (HCC)    Hyponatremia       Current Outpatient Medications   Medication Sig Dispense Refill    atorvastatin (LIPITOR) 40 MG tablet TAKE ONE TABLET BY MOUTH DAILY 30 tablet 5    amoxicillin (AMOXIL) 500 MG capsule       spironolactone (ALDACTONE) 100 MG tablet TAKE ONE TABLET BY MOUTH DAILY 30 tablet 4    empagliflozin (JARDIANCE) 10 MG tablet Take 1 tablet by mouth daily 90 tablet 1    Dulaglutide (TRULICITY) 3 IG/4.8AG SOPN Inject 3 mg into the skin once a week 4 pen 3    OneTouch Delica Lancets 62B MISC Test 3 times daily E11.9 100 each 10    Insulin Pen Needle 29G X 12.7MM MISC 1 each by Does not apply route daily 100 each 3    amLODIPine (NORVASC) 5 MG tablet Take 1 tablet by mouth daily Daily 30 tablet 5    metFORMIN (GLUCOPHAGE-XR) 500 MG extended release tablet TAKE FOUR TABLETS BY MOUTH DAILY WITH BREAKFAST 120 tablet 3    ONETOUCH ULTRA strip USE TO TEST THREE TIMES A  strip 1    atenolol (TENORMIN) 50 MG tablet Take 0.5 tablets by mouth daily 30 tablet 5    fluticasone (FLONASE) 50 MCG/ACT nasal spray 1 spray by Each Nostril route daily as needed for Rhinitis      Blood Glucose Monitoring Suppl (ACURA BLOOD GLUCOSE METER) w/Device KIT Inject 1 Applicatorful into the skin 2 times daily (before meals) 1 kit 0     No current facility-administered medications for this visit. NUTRITION ASSESSMENT    Biochemical Data:    Lab Results   Component Value Date    LABA1C 9.2 06/23/2021     Lab Results   Component Value Date    .3 06/23/2021       Lab Results   Component Value Date    CHOL 130 06/23/2021    CHOL 188 03/09/2020    CHOL 144 06/13/2017     Lab Results   Component Value Date    TRIG 124 06/23/2021    TRIG 117 03/09/2020    TRIG 86 06/13/2017     Lab Results   Component Value Date    HDL 40 06/23/2021    HDL 40 03/09/2020    HDL 50 05/06/2019     Lab Results   Component Value Date    LDLCALC 65 06/23/2021    LDLCALC 125 (H) 03/09/2020    LDLCALC 86 06/13/2017     Lab Results   Component Value Date    LABVLDL 25 06/23/2021    LABVLDL 23 03/09/2020    LABVLDL 17 06/13/2017     No results found for: CHOLHDLRATIO    Lab Results   Component Value Date    WBC 6.9 06/23/2021    HGB 11.5 (L) 06/23/2021    HCT 33.2 (L) 06/23/2021    MCV 83.4 06/23/2021     06/23/2021       Lab Results   Component Value Date    CREATININE 1.1 09/09/2021    BUN 17 09/09/2021     09/09/2021    K 4.6 09/09/2021    CL 99 09/09/2021    CO2 27 09/09/2021       Diabetes Medications: Yes  Knows name and dose of prescribed medications Yes  Knows prescribed schedule for medicationsYes  Recent change in medication type/dosage: No  Stores  medications properlyYes  Comments:     Monitoring:   Has BG meter: Yes  Testing frequency: 1 daily  Recent results:   Hypoglycemia? No    Anthropometric Measurements:   Wt:   Wt Readings from Last 3 Encounters:   09/09/21 254 lb 9.6 oz (115.5 kg)   07/06/21 251 lb 9.6 oz (114.1 kg)   06/23/21 254 lb 6.4 oz (115.4 kg)      BMI:   BMI Readings from Last 3 Encounters:   09/09/21 46.57 kg/m²   07/06/21 46.02 kg/m²   06/23/21 46.53 kg/m²     Patient's stated goal weight:   7% Weight loss goal weight:     Physical Activity History:   Physical activity: Walking  Frequency of activity: routine  Duration of activity: irregular  Obstacles to activity: none    Sleep: C-pap is ready for use. Patient is fearful of re-starting. Says having Covid has made her fearful of the machine. Food and Nutrition History:   Nutrition Awareness/Previous DSMES: yes  Number of people in household: 2  Frequency of Meals Eaten away from home:1-2 weekly  Food Availability Problems  Within the past 12 months, have you worried that your food would run out before you got money to buy more? No  Within the past 12 months, has the food you bought not lasted till the end of the month and you didn't have money to get more? No  Beverage consumption: water-3-4 glasses, regular pop-sometimes-2 cans  Alcohol consumption: yes  Usual Food consumption:   3 meals, delays lunch     Barriers:   -having difficulty restarting C-Pap          Follow Up Plan: 3 months Will remain available. Contact information given.     Referring Provider:  Vini Cain MD  Time spent with patient: 30  minutes

## 2021-11-15 ENCOUNTER — CLINICAL DOCUMENTATION (OUTPATIENT)
Dept: OTHER | Age: 56
End: 2021-11-15

## 2021-11-16 ENCOUNTER — OFFICE VISIT (OUTPATIENT)
Dept: ENDOCRINOLOGY | Age: 56
End: 2021-11-16
Payer: COMMERCIAL

## 2021-11-16 PROCEDURE — 97802 MEDICAL NUTRITION INDIV IN: CPT | Performed by: DIETITIAN, REGISTERED

## 2021-12-16 NOTE — PROGRESS NOTES
Marlen Barlow (:  1965) is a 48 y.o. female, here for evaluation of the following chief complaint(s):    Diabetes      ASSESSMENT/PLAN:  1. Diabetes mellitus type 2, uncontrolled, with complications (HCC)  -     POCT glycosylated hemoglobin (Hb A1C)  A1c value has improved. She is taking her Metformin at 1000 mg daily, she is consistent with Trulicity, and she had fallen away from taking Jardiance but she resumed a couple of weeks ago. Encouraged her to be more consistent. She will be seeing endocrinology in the next few months. 2. Essential hypertension, benign  Well-controlled on amlodipine and spironolactone and atenolol. Off Diovan as per nephrology  3. Hyperlipidemia, unspecified hyperlipidemia type  Stable on atorvastatin    4. Sleep apnea -strongly encouraged her to restart her CPAP    5. HM-encouraged patient to get the shots she is due for. Return in about 6 months (around 2022). SUBJECTIVE/OBJECTIVE:  HPI   Patient is here for routine visit. Overall she thinks her diabetes has been better control and reports home readings between 120 and 140. She does report having a sweet tooth and she is working hard to try and manage this. She is established with endocrinology and has an upcoming visit there. She realized she had not been taking her [place] Monday so she resumed it in recent weeks. She denies side effects from her medications at the current doses. She did have a fall going up the steps a few weeks back and injured her right knee. It is improving. She has not been using her CPAP since she had Covid. She states she has been \"afraid. \"  She does have some increased fatigue. Past Medical History:   Diagnosis Date    Allergic rhinitis     Alopecia     COVID-19 2020    Diabetes mellitus type 2, uncontrolled, with complications ([facility] [address]     TYPE 2    Fibroids, intramural 2011    History of uterine fibroid     Hyperaldosteronism ([facility] [address]     ?     Hyperlipidemia     ZA (obstructive sleep apnea) 03/01/2011    [facility]    Osteoarthritis of right knee     secondary hypertension     Vitamin D deficiency 05/22/2012       Current Outpatient Medications   Medication Sig Dispense Refill    spironolactone (ALDACTONE) 100 MG tablet TAKE ONE TABLET BY MOUTH DAILY 30 tablet 4    atorvastatin (LIPITOR) 40 MG tablet TAKE ONE TABLET BY MOUTH DAILY 30 tablet 5    empagliflozin (JARDIANCE) 10 MG tablet Take 1 tablet by mouth daily 90 tablet 1    Dulaglutide (TRULICITY) 3 CX/3.5SU SOPN Inject 3 mg into the skin once a week 4 pen 3    OneTouch Delica Lancets 27J MISC Test 3 times daily E11.9 100 each 10    Insulin Pen Needle 29G X 12.7MM MISC 1 each by Does not apply route daily 100 each 3    amLODIPine (NORVASC) 5 MG tablet Take 1 tablet by mouth daily Daily 30 tablet 5    metFORMIN (GLUCOPHAGE-XR) 500 MG extended release tablet TAKE FOUR TABLETS BY MOUTH DAILY WITH BREAKFAST (Patient taking differently: 500 mg TAKE TWO TABLETS BY MOUTH DAILY WITH BREAKFAST) 120 tablet 3    ONETOUCH ULTRA strip USE TO TEST THREE TIMES A  strip 1    atenolol (TENORMIN) 50 MG tablet Take 0.5 tablets by mouth daily 30 tablet 5    fluticasone (FLONASE) 50 MCG/ACT nasal spray 1 spray by Each Nostril route daily as needed for Rhinitis      Blood Glucose Monitoring Suppl (ACURA BLOOD GLUCOSE METER) w/Device KIT Inject 1 Applicatorful into the skin 2 times daily (before meals) 1 kit 0     No current facility-administered medications for this visit. Physical Exam  Vitals reviewed. HENT:      Head: Normocephalic and atraumatic. Cardiovascular:      Rate and Rhythm: Normal rate and regular rhythm. Abdominal:      General: Abdomen is flat. Bowel sounds are normal.   Musculoskeletal:      Right lower leg: No edema. Left lower leg: No edema. Comments: Crepitance right knee   Neurological:      Mental Status: She is alert.    Psychiatric:         Mood and Affect: Mood normal.               This note was generated completely or in part utilizing Dragon dictation speech recognition software. Occasionally, words are mistranscribed and despite editing, the text may contain inaccuracies due to incorrect word recognition. If further clarification is needed please contact the office at [phone]          An electronic signature was used to authenticate this note.     --Fartun Peña MD

## 2021-12-17 ENCOUNTER — OFFICE VISIT (OUTPATIENT)
Dept: INTERNAL MEDICINE CLINIC | Age: 56
End: 2021-12-17
Payer: COMMERCIAL

## 2021-12-17 VITALS
OXYGEN SATURATION: 99 % | DIASTOLIC BLOOD PRESSURE: 82 MMHG | HEART RATE: 68 BPM | RESPIRATION RATE: 14 BRPM | SYSTOLIC BLOOD PRESSURE: 126 MMHG

## 2021-12-17 DIAGNOSIS — I10 ESSENTIAL HYPERTENSION, BENIGN: ICD-10-CM

## 2021-12-17 DIAGNOSIS — G47.33 OSA (OBSTRUCTIVE SLEEP APNEA): ICD-10-CM

## 2021-12-17 DIAGNOSIS — E78.5 HYPERLIPIDEMIA, UNSPECIFIED HYPERLIPIDEMIA TYPE: ICD-10-CM

## 2021-12-17 LAB — HBA1C MFR BLD: 7.6 %

## 2021-12-17 PROCEDURE — 3051F HG A1C>EQUAL 7.0%<8.0%: CPT | Performed by: INTERNAL MEDICINE

## 2021-12-17 PROCEDURE — 90674 CCIIV4 VAC NO PRSV 0.5 ML IM: CPT | Performed by: INTERNAL MEDICINE

## 2021-12-17 PROCEDURE — 83036 HEMOGLOBIN GLYCOSYLATED A1C: CPT | Performed by: INTERNAL MEDICINE

## 2021-12-17 PROCEDURE — 90471 IMMUNIZATION ADMIN: CPT | Performed by: INTERNAL MEDICINE

## 2021-12-17 PROCEDURE — 99214 OFFICE O/P EST MOD 30 MIN: CPT | Performed by: INTERNAL MEDICINE

## 2021-12-17 RX ORDER — METFORMIN HYDROCHLORIDE 500 MG/1
1000 TABLET, EXTENDED RELEASE ORAL
Qty: 60 TABLET | Refills: 0
Start: 2021-12-17 | End: 2022-01-19

## 2021-12-17 NOTE — PATIENT INSTRUCTIONS
Get covid booster - 3d shot!!    Nurse visit-  Shingles 2 shots over 6 months  Hep B 3 shots over 6 months    Restart cpap for sleep apnea    Take the 3 not 2 diabetes pills

## 2022-01-11 DIAGNOSIS — I10 ESSENTIAL HYPERTENSION, BENIGN: ICD-10-CM

## 2022-01-12 LAB
ALBUMIN SERPL-MCNC: 4.6 G/DL (ref 3.4–5)
ANION GAP SERPL CALCULATED.3IONS-SCNC: 14 MMOL/L (ref 3–16)
BUN BLDV-MCNC: 19 MG/DL (ref 7–20)
CALCIUM SERPL-MCNC: 9.8 MG/DL (ref 8.3–10.6)
CHLORIDE BLD-SCNC: 95 MMOL/L (ref 99–110)
CO2: 28 MMOL/L (ref 21–32)
CREAT SERPL-MCNC: 1.4 MG/DL (ref 0.6–1.1)
CREATININE URINE: 104.4 MG/DL (ref 28–259)
GFR AFRICAN AMERICAN: 47
GFR NON-AFRICAN AMERICAN: 39
GLUCOSE BLD-MCNC: 112 MG/DL (ref 70–99)
MICROALBUMIN UR-MCNC: 5.9 MG/DL
MICROALBUMIN/CREAT UR-RTO: 56.5 MG/G (ref 0–30)
PHOSPHORUS: 4.6 MG/DL (ref 2.5–4.9)
POTASSIUM SERPL-SCNC: 4.6 MMOL/L (ref 3.5–5.1)
SODIUM BLD-SCNC: 137 MMOL/L (ref 136–145)

## 2022-01-17 RX ORDER — DULAGLUTIDE 3 MG/.5ML
3 INJECTION, SOLUTION SUBCUTANEOUS WEEKLY
Qty: 4 PEN | Refills: 3 | Status: SHIPPED | OUTPATIENT
Start: 2022-01-17 | End: 2022-06-17 | Stop reason: SDUPTHER

## 2022-01-19 RX ORDER — METFORMIN HYDROCHLORIDE 500 MG/1
TABLET, EXTENDED RELEASE ORAL
Qty: 120 TABLET | Refills: 3 | Status: SHIPPED | OUTPATIENT
Start: 2022-01-19 | End: 2022-06-06

## 2022-02-09 RX ORDER — EMPAGLIFLOZIN 10 MG/1
TABLET, FILM COATED ORAL
Qty: 90 TABLET | Refills: 1 | Status: SHIPPED | OUTPATIENT
Start: 2022-02-09 | End: 2022-05-23 | Stop reason: ALTCHOICE

## 2022-02-09 NOTE — TELEPHONE ENCOUNTER
Medication:   Requested Prescriptions     Pending Prescriptions Disp Refills    JARDIANCE 10 MG tablet [Pharmacy Med Name: Sy Ruano 10 MG TABLET] 90 tablet 1     Sig: TAKE ONE TABLET BY MOUTH DAILY       Last Filled 07/06/2021:      Patient Phone Number: [phone] (home) [phone] (work)    Last appt: 11/16/2021   Next appt: Visit date not found    Last Labs DM:   Lab Results   Component Value Date    LABA1C 7.6 12/17/2021

## 2022-02-15 ENCOUNTER — OFFICE VISIT (OUTPATIENT)
Dept: ENDOCRINOLOGY | Age: 57
End: 2022-02-15
Payer: COMMERCIAL

## 2022-02-15 PROCEDURE — 97803 MED NUTRITION INDIV SUBSEQ: CPT | Performed by: DIETITIAN, REGISTERED

## 2022-02-15 NOTE — PROGRESS NOTES
Medical Nutrition Therapy for Diabetes    Evelina Arellano  February 15, 2022      Patient Care Team:  Sonia Grijalva MD as PCP - General (Internal Medicine)  Sonia Grijalva MD as PCP - [facility] Empaneled Provider  Liz Santiago DO as Consulting Physician (Pulmonology)  Krista Omalley RD, LD as Dietitian (Dietitian)    Reason for visit: uncontrolled, type 2 Diabetes    ASSESSMENT/PLAN:   NUTRITION DIAGNOSIS   Follow up    #1 Problem: Altered Nutrition-Related Laboratory Values (NC-2.2)  Related to: Endocrine/Diabetes   As Evidenced by: Elevated Plasma glucose and/or HgbA1c levels         #2 Problem: Inadequate Carbohydrate Intake  Related to: food and nutrition knowledge deficit regarding controlling blood glucose  As evidenced by: food recall with less than 5-45 g carbohydrate per meal.     #3 Problem: Excessive Carbohydrate Intake (NI-5.8. 2)  Related to: Food-and nutrition-related knowledge deficit concerning appropriate amount of carbohydrate intake  As evidenced by: Estimated carbohydrate intake that is consistently more than the recommended amounts    NUTRITION INTERVENTION  Nutrition Prescription: 30 grams carbohydrate per meal with protein and non-starch vegetables  15 gram carbohydrate snacks    Diabetes Education/Counseling included:   Carbohydrate Control, Activity/exercise, Label-reading and Medications, Benefits of adequate hydration and quality sleep     Interventions: Congratulated for improved water consumption! Control Carbohydrate Intake using Plate Guide, Control Carbohydrate Intake using Carb Counting, Increase intake of whole grains and Increase activity level by walking  Recommended using Nutrition Facts information to determine amount of carbohydrate in portions. Encouraged consistent inclusion of 30 gram carbohydrate each meal.  Suggested having snack when meals times are delayed. Encouraged restarting C-Pap.   NUTRITION MONITORING AND EVALUATION  Restart using C-pap  30 gram carbohydrate meals  Increase activity         Patient Active Problem List   Diagnosis    Hyperlipidemia    Essential hypertension, benign    ZA (obstructive sleep apnea)    Perimenopause    Vitamin D deficiency    Left ankle pain    Type 2 diabetes mellitus with microalbuminuria (HCC)    Knee pain, chronic    Iron deficiency anemia due to chronic blood loss    Mild intermittent asthma without complication    Colon polyps    Abnormal nuclear cardiac imaging test    Elevated serum protein level    Allergic rhinitis    Diabetes mellitus type 2, uncontrolled, with complications (HCC)    Essential hypertension    Alopecia    Central centrifugal scarring alopecia    Pneumonia    COVID-19    [facility]ADDIE [facility] kidney [facility]) ([facility] [address]    Hyponatremia       Current Outpatient Medications   Medication Sig Dispense Refill    JARDIANCE 10 MG tablet TAKE ONE TABLET BY MOUTH DAILY 90 tablet 1    metFORMIN (GLUCOPHAGE-XR) 500 MG extended release tablet TAKE FOUR TABLETS BY MOUTH DAILY WITH BREAKFAST 120 tablet 3    amLODIPine (NORVASC) 5 MG tablet TAKE ONE TABLET BY MOUTH DAILY 30 tablet 5    Dulaglutide (TRULICITY) 3 AJ/5.4XR SOPN Inject 3 mg into the skin once a week 4 pen 3    spironolactone (ALDACTONE) 100 MG tablet TAKE ONE TABLET BY MOUTH DAILY 30 tablet 4    atorvastatin (LIPITOR) 40 MG tablet TAKE ONE TABLET BY MOUTH DAILY 30 tablet 5    OneTouch Delica Lancets 84Z MISC Test 3 times daily E11.9 100 each 10    Insulin Pen Needle 29G X 12.7MM MISC 1 each by Does not apply route daily 100 each 3    ONETOUCH ULTRA strip USE TO TEST THREE TIMES A  strip 1    atenolol (TENORMIN) 50 MG tablet Take 0.5 tablets by mouth daily 30 tablet 5    fluticasone (FLONASE) 50 MCG/ACT nasal spray 1 spray by Each Nostril route daily as needed for Rhinitis      Blood Glucose Monitoring Suppl (ACURA BLOOD GLUCOSE METER) w/Device KIT Inject 1 Applicatorful into the skin 2 times daily (before meals) 1 kit 0     No current facility-administered medications for this visit. NUTRITION ASSESSMENT    Biochemical Data:    Lab Results   Component Value Date    LABA1C 7.6 12/17/2021     Lab Results   Component Value Date    .3 06/23/2021       Lab Results   Component Value Date    CHOL 130 06/23/2021    CHOL 188 03/09/2020    CHOL 144 06/13/2017     Lab Results   Component Value Date    TRIG 124 06/23/2021    TRIG 117 03/09/2020    TRIG 86 06/13/2017     Lab Results   Component Value Date    HDL 40 06/23/2021    HDL 40 03/09/2020    HDL 50 05/06/2019     Lab Results   Component Value Date    LDLCALC 65 06/23/2021    LDLCALC 125 (H) 03/09/2020    LDLCALC 86 06/13/2017     Lab Results   Component Value Date    LABVLDL 25 06/23/2021    LABVLDL 23 03/09/2020    LABVLDL 17 06/13/2017     No results found for: CHOLHDLRATIO    Lab Results   Component Value Date    WBC 6.9 06/23/2021    HGB 11.5 (L) 06/23/2021    HCT 33.2 (L) 06/23/2021    MCV 83.4 06/23/2021     06/23/2021       Lab Results   Component Value Date    CREATININE 1.4 (H) 01/11/2022    BUN 19 01/11/2022     01/11/2022    K 4.6 01/11/2022    CL 95 (L) 01/11/2022    CO2 28 01/11/2022       Diabetes Medications: Yes  Knows name and dose of prescribed medications Yes  Knows prescribed schedule for medicationsYes  Recent change in medication type/dosage: No  Stores  medications properlyYes  Comments: Unable to afford Jardiance for the long term. Monitoring:   Has BG meter: Yes  Testing frequency: 1  Recent results: fasting 165  Hypoglycemia? No    Anthropometric Measurements:   Wt:   Wt Readings from Last 3 Encounters:   01/11/22 259 lb 6.4 oz (117.7 kg)   09/09/21 254 lb 9.6 oz (115.5 kg)   07/06/21 251 lb 9.6 oz (114.1 kg)      BMI:   BMI Readings from Last 3 Encounters:   01/11/22 47.44 kg/m²   09/09/21 46.57 kg/m²   07/06/21 46.02 kg/m²     Patient's stated goal weight:   7% Weight loss goal weight:     Physical Activity History:   Physical activity: walking, housekeeping  Frequency of activity: routine  Duration of activity: irregualr  Obstacles to activity: none    Sleep: Preparing to restart C-pap use. Food and Nutrition History:   Nutrition Awareness/Previous DSMES: yes  Number of people in household: 2  Frequency of Meals Eaten away from home:1 or 2 xs weekly  Food Availability Problems  Within the past 12 months, have you worried that your food would run out before you got money to buy more? No  Within the past 12 months, has the food you bought not lasted till the end of the month and you didn't have money to get more? No  Beverage consumption: water - 8 glasses, reduced frequency of regular pop, may drink 3 cans when having regular pop  Alcohol consumption: yes  Usual Food consumption:   3 meals, delays dinner     Barriers:   -none          Follow Up Plan: 3 months  Will remain available. Contact information given.     Referring Provider: Alek Desai MD  Time spent with patient: 30 minutes

## 2022-02-15 NOTE — LETTER
[address] Zach Rao  Phone: [phone]  Fax: [phone] MAME Adhikari, LD    February 15, 2022     Leatha Rodriguez, [address] [place] [address]    Patient: Ryan Tim   MR Number: <[id number]>   YOB: 1965   Date of Visit: 2/15/2022       Dear Leatha Rodriguez:    Thank you for referring Kamron Ogden to me for evaluation/treatment. Below are the relevant portions of my assessment and plan of care. If you have questions, please do not hesitate to call me. I look forward to following Aldair Heredia along with you.     Sincerely,      Tomás Helton RD, LD

## 2022-03-14 ENCOUNTER — TELEPHONE (OUTPATIENT)
Dept: INTERNAL MEDICINE CLINIC | Age: 57
End: 2022-03-14

## 2022-03-14 NOTE — TELEPHONE ENCOUNTER
Brice Maldonado with [facility] needs to have the patients Immunization record an current Med list faxed to the number below:      [phone]

## 2022-05-24 ENCOUNTER — OFFICE VISIT (OUTPATIENT)
Dept: ENDOCRINOLOGY | Age: 57
End: 2022-05-24
Payer: COMMERCIAL

## 2022-05-24 PROCEDURE — 97803 MED NUTRITION INDIV SUBSEQ: CPT | Performed by: DIETITIAN, REGISTERED

## 2022-05-24 NOTE — PROGRESS NOTES
Medical Nutrition Therapy for Diabetes    Tara Shah  May 24, 2022      Patient Care Team:  Hugo Mena MD as PCP - General (Internal Medicine)  Hugo Mena MD as PCP - REHABILITATION [facility] Empaneled Provider  Hermila Varela DO as Consulting Physician (Pulmonology)  Bairon Duvall RD, LD as Dietitian (Dietitian Registered)    Reason for visit: uncontrolled, Type 2 Diabetes    ASSESSMENT/PLAN:   NUTRITION DIAGNOSIS  Follow up    #1 Problem: Altered Nutrition-Related Laboratory Values (NC-2.2)  Related to: Endocrine/Diabetes   As Evidenced by: Elevated Plasma glucose and/or HgbA1c levels         #2 Problem: Inadequate Carbohydrate Intake  Related to: food and nutrition knowledge deficit regarding controlling blood glucose  As evidenced by: food recall with less than 5-45 g carbohydrate per meal.     #3 Problem: Excessive Carbohydrate Intake (NI-5.8. 2)  Related to: Food-and nutrition-related knowledge deficit concerning appropriate amount of carbohydrate intake  As evidenced by: Estimated carbohydrate intake that is consistently more than the recommended amounts    NUTRITION INTERVENTION  Nutrition Prescription: 30 grams carbohydrate per meal with protein and non-starch vegetables  15 gram carbohydrate snacks    Diabetes Education/Counseling included:  Reinforce benefits of restarting C-Pap. Reviewed MyPlate guide. Interventions:  Encouraged returning to treatment of ZA with C-Pap use. Recommended 3 meals with non-starch vegetables with carbohydrate and protein each meal, and return to gym workouts as planned.     NUTRITION MONITORING AND EVALUATION  Restart using C-pap  30 gram carbohydrate meals  Increase activity         Patient Active Problem List   Diagnosis    Hyperlipidemia    Essential hypertension, benign    ZA (obstructive sleep apnea)    Perimenopause    Vitamin D deficiency    Left ankle pain    Type 2 diabetes mellitus with microalbuminuria (HCC)    Knee pain, chronic    Iron deficiency anemia due to chronic blood loss    Mild intermittent asthma without complication    Colon polyps    Abnormal nuclear cardiac imaging test    Elevated serum protein level    Allergic rhinitis    Diabetes mellitus type 2, uncontrolled, with complications (HCC)    Essential hypertension    Alopecia    Central centrifugal scarring alopecia    Pneumonia    COVID-19    MADDIE (acute kidney injury) ([facility] [address]    Hyponatremia       Current Outpatient Medications   Medication Sig Dispense Refill    metFORMIN (GLUCOPHAGE-XR) 500 MG extended release tablet TAKE FOUR TABLETS BY MOUTH DAILY WITH BREAKFAST 120 tablet 3    amLODIPine (NORVASC) 5 MG tablet TAKE ONE TABLET BY MOUTH DAILY 30 tablet 5    Dulaglutide (TRULICITY) 3 ZQ/1.6KX SOPN Inject 3 mg into the skin once a week 4 pen 3    spironolactone (ALDACTONE) 100 MG tablet TAKE ONE TABLET BY MOUTH DAILY 30 tablet 4    atorvastatin (LIPITOR) 40 MG tablet TAKE ONE TABLET BY MOUTH DAILY 30 tablet 5    OneTouch Delica Lancets 95X MISC Test 3 times daily E11.9 100 each 10    Insulin Pen Needle 29G X 12.7MM MISC 1 each by Does not apply route daily 100 each 3    ONETOUCH ULTRA strip USE TO TEST THREE TIMES A  strip 1    atenolol (TENORMIN) 50 MG tablet Take 0.5 tablets by mouth daily 30 tablet 5    fluticasone (FLONASE) 50 MCG/ACT nasal spray 1 spray by Each Nostril route daily as needed for Rhinitis      Blood Glucose Monitoring Suppl (ACURA BLOOD GLUCOSE METER) w/Device KIT Inject 1 Applicatorful into the skin 2 times daily (before meals) 1 kit 0     No current facility-administered medications for this visit.          NUTRITION ASSESSMENT    Biochemical Data:    Lab Results   Component Value Date    LABA1C 7.6 12/17/2021     Lab Results   Component Value Date    .3 06/23/2021       Lab Results   Component Value Date    CHOL 130 06/23/2021    CHOL 188 03/09/2020    CHOL 144 06/13/2017     Lab Results   Component Value Date TRIG 124 06/23/2021    TRIG 117 03/09/2020    TRIG 86 06/13/2017     Lab Results   Component Value Date    HDL 40 06/23/2021    HDL 40 03/09/2020    HDL 50 05/06/2019     Lab Results   Component Value Date    LDLCALC 65 06/23/2021    LDLCALC 125 (H) 03/09/2020    LDLCALC 86 06/13/2017     Lab Results   Component Value Date    LABVLDL 25 06/23/2021    LABVLDL 23 03/09/2020    LABVLDL 17 06/13/2017     No results found for: CHOLHDLRATIO    Lab Results   Component Value Date    WBC 6.9 06/23/2021    HGB 11.5 (L) 06/23/2021    HCT 33.2 (L) 06/23/2021    MCV 83.4 06/23/2021     06/23/2021       Lab Results   Component Value Date    CREATININE 1.4 (H) 01/11/2022    BUN 19 01/11/2022     01/11/2022    K 4.6 01/11/2022    CL 95 (L) 01/11/2022    CO2 28 01/11/2022       Diabetes Medications: Yes  Knows name and dose of prescribed medications Yes  Knows prescribed schedule for medicationsYes  Recent change in medication type/dosage: No  Stores  medications properlyYes  Comments:     Monitoring:   Has BG meter: Yes  Testing frequency: 1/day  Recent results: fasting 150-250  Hypoglycemia? No    Anthropometric Measurements: Wt:   Wt Readings from Last 3 Encounters:   05/23/22 253 lb 6.4 oz (114.9 kg)   01/11/22 259 lb 6.4 oz (117.7 kg)   09/09/21 254 lb 9.6 oz (115.5 kg)      BMI:   BMI Readings from Last 3 Encounters:   05/23/22 46.35 kg/m²   01/11/22 47.44 kg/m²   09/09/21 46.57 kg/m²     Patient's stated goal weight:   7% Weight loss goal weight:   Patient reports losing 6 lbs. .    Physical Activity History:   Physical activity:  Walking, Plans to return to gym workouts  Frequency of activity: routine  Duration of activity: intermittent, short duration  Obstacles to activity: none    Sleep: C-Pap not in use, very sleepy today from returning from [facility] [employer] [place] and Nutrition History:   Nutrition Awareness/Previous DSMES: yes  Number of people in household: 2  Frequency of Meals Eaten away from home:1-2/week  Food Availability Problems  Within the past 12 months, have you worried that your food would run out before you got money to buy more? No  Within the past 12 months, has the food you bought not lasted till the end of the month and you didn't have money to get more? No  Beverage consumption: water 6 glasses  Alcohol consumption: yes  Usual Food consumption:   3 meals, 30-45 grams carbohydrate, minimal non-starch vegetables, eating has been different since being on vacation    Barriers:   -none          Follow Up Plan: 3 months Will remain available. Contact information given.     Referring Provider: Juan Alberto Moctezuma MD  Time spent with patient: 15 minutes

## 2022-06-05 DIAGNOSIS — E78.5 HYPERLIPIDEMIA, UNSPECIFIED HYPERLIPIDEMIA TYPE: ICD-10-CM

## 2022-06-06 RX ORDER — METFORMIN HYDROCHLORIDE 500 MG/1
TABLET, EXTENDED RELEASE ORAL
Qty: 120 TABLET | Refills: 3 | Status: SHIPPED | OUTPATIENT
Start: 2022-06-06

## 2022-06-06 RX ORDER — ATORVASTATIN CALCIUM 40 MG/1
TABLET, FILM COATED ORAL
Qty: 30 TABLET | Refills: 5 | Status: SHIPPED | OUTPATIENT
Start: 2022-06-06 | End: 2022-06-17 | Stop reason: SDUPTHER

## 2022-06-16 NOTE — PROGRESS NOTES
Jane Salvador (:  1965) is a 55 y.o. female, here for evaluation of the following chief complaint(s):    Follow-up and Medication Check (diabetic med check )      ASSESSMENT/PLAN:  1. Diabetes mellitus type 2, uncontrolled, with complications ([facility] [address]  -     Advised patient to increase her metformin to 2000 mg/day. Continue/resume Trulicity. Depending on A1c results, may add back Jardiance. Encouraged her to follow-up with endocrinology. -     Diabetic Foot Exam  -     Hemoglobin A1C; Future  -     Dulaglutide (TRULICITY) 3 RM/9.5RG SOPN; Inject 3 mg into the skin once a week, Disp-4 pen, R-3Normal  2. Hyperlipidemia, unspecified hyperlipidemia type  Stable on atorvastatin  -     Lipid Panel; Future  -     atorvastatin (LIPITOR) 40 MG tablet; TAKE ONE TABLET BY MOUTH DAILY, Disp-30 tablet, R-5Normal  3. Essential hypertension, benign  Fair Control on atenolol, spironolactone, amlodipine. Not taking an ACE or ARB as per nephrology. Work on weight loss and reduced salt  -     Comprehensive Metabolic Panel; Future  -     atenolol (TENORMIN) 50 MG tablet; Take 0.5 tablets by mouth daily, Disp-30 tablet, R-5Normal  -     spironolactone (ALDACTONE) 100 MG tablet; TAKE ONE TABLET BY MOUTH DAILY, Disp-30 tablet, R-4Normal  -     amLODIPine (NORVASC) 5 MG tablet; TAKE ONE TABLET BY MOUTH DAILY, Disp-30 tablet, R-5Normal  4. Chronic left-sided low back pain without sciatica  -     XR LUMBAR SPINE (2-3 VIEWS); Future  5. Primary osteoarthritis of right knee  -     [address] [facility]  6. Encounter for screening mammogram for malignant neoplasm of breast  -     [facility]EN DIGITAL SCREEN BILATERAL; Future  7. Screen for colon cancer  -     [facility] - Marita Steve MD, Gastroenterology, [place]       Sleep apnea -     HM-Hep B shot today and next visit.  encouraged patient to get other shots she is due for.   Return in about 3 months (around 9/17/2022). SUBJECTIVE/OBJECTIVE:  HPI   Patient is here for routine visit. She recently returned from a trip to [place] where she stayed with family members. She states that she picked up weight because they fed her well including a lot of rice. Her glucose readings are running more elevated. She says they generally are around 220. She complains that her right knee sometimes wants to give out. Chart review shows an x-ray from about 5 years ago with moderate osteoarthritis. She also complains of left-sided low back pain. It does not radiate to her left leg. She states it felt better when she was sleeping on a hard mattress while traveling. Review of Systems   Constitutional: Positive for unexpected weight change. Respiratory: Negative for shortness of breath. Cardiovascular: Negative for chest pain. Musculoskeletal: Positive for arthralgias and back pain. Negative for gait problem. Neurological: Negative for headaches. Past Medical History:   Diagnosis Date    Allergic rhinitis     Alopecia     COVID-19 11/2020    Diabetes mellitus type 2, uncontrolled, with complications ([facility] [address] 78/88/1763    TYPE 2    Fibroids, intramural 06/08/2011    History of uterine fibroid     Hyperaldosteronism ([facility] [address]     ?     Hyperlipidemia     ZA (obstructive sleep apnea) 03/01/2011    [facility] sleep eval 2/20    Osteoarthritis of right knee     secondary hypertension     Vitamin D deficiency 05/22/2012       Current Outpatient Medications   Medication Sig Dispense Refill    atenolol (TENORMIN) 50 MG tablet Take 0.5 tablets by mouth daily 30 tablet 5    atorvastatin (LIPITOR) 40 MG tablet TAKE ONE TABLET BY MOUTH DAILY 30 tablet 5    Dulaglutide (TRULICITY) 3 MO/3.6ZI SOPN Inject 3 mg into the skin once a week 4 pen 3    spironolactone (ALDACTONE) 100 MG tablet TAKE ONE TABLET BY MOUTH DAILY 30 tablet 4    amLODIPine (NORVASC) 5 MG tablet TAKE ONE TABLET BY MOUTH DAILY 30 tablet 5    metFORMIN (GLUCOPHAGE-XR) 500 mg extended release tablet TAKE FOUR TABLETS BY MOUTH DAILY WITH BREAKFAST 120 tablet 3    OneTouch Delica Lancets 42V MISC Test 3 times daily E11.9 100 each 10    Insulin Pen Needle 29G X 12.7MM MISC 1 each by Does not apply route daily 100 each 3    ONETOUCH ULTRA strip USE TO TEST THREE TIMES A  strip 1    fluticasone (FLONASE) 50 MCG/ACT nasal spray 1 spray by Each Nostril route daily as needed for Rhinitis      Blood Glucose Monitoring Suppl (ACURA BLOOD GLUCOSE METER) w/Device KIT Inject 1 Applicatorful into the skin 2 times daily (before meals) 1 kit 0     No current facility-administered medications for this visit. Physical Exam  Vitals reviewed. Constitutional:       General: She is not in acute distress. HENT:      Head: Normocephalic and atraumatic. Cardiovascular:      Rate and Rhythm: Normal rate and regular rhythm. Pulmonary:      Effort: Pulmonary effort is normal.      Breath sounds: Normal breath sounds. Musculoskeletal:      Right lower leg: No edema. Left lower leg: No edema. Comments: Anterior knee discomfort w flex/extend  Tenderness to palpation lower lumbar spine, reduced lordosis   Neurological:      General: No focal deficit present. Mental Status: She is alert and oriented to person, place, and time. Psychiatric:         Mood and Affect: Mood normal.       Sensory exam of the foot is normal, tested with the monofilament. Good pulses, no lesions or ulcers, good peripheral pulses. 140/80      On this date  I have spent 40 minutes reviewing previous notes, test results and face to face with the patient discussing the diagnosis and importance of compliance with the treatment plan as well as documenting on the day of the visit. This note was generated completely or in part utilizing Dragon dictation speech recognition software.   Occasionally, words are mistranscribed and despite editing, the text may contain inaccuracies due to incorrect word recognition. If further clarification is needed please contact the office at [phone]          An electronic signature was used to authenticate this note.     --Norma Gonsalves MD

## 2022-06-17 ENCOUNTER — OFFICE VISIT (OUTPATIENT)
Dept: INTERNAL MEDICINE CLINIC | Age: 57
End: 2022-06-17
Payer: COMMERCIAL

## 2022-06-17 VITALS
HEART RATE: 77 BPM | WEIGHT: 263.2 LBS | SYSTOLIC BLOOD PRESSURE: 130 MMHG | OXYGEN SATURATION: 99 % | DIASTOLIC BLOOD PRESSURE: 82 MMHG | BODY MASS INDEX: 48.14 KG/M2

## 2022-06-17 DIAGNOSIS — G89.29 CHRONIC LEFT-SIDED LOW BACK PAIN WITHOUT SCIATICA: ICD-10-CM

## 2022-06-17 DIAGNOSIS — Z12.31 ENCOUNTER FOR SCREENING MAMMOGRAM FOR MALIGNANT NEOPLASM OF BREAST: ICD-10-CM

## 2022-06-17 DIAGNOSIS — E78.5 HYPERLIPIDEMIA, UNSPECIFIED HYPERLIPIDEMIA TYPE: ICD-10-CM

## 2022-06-17 DIAGNOSIS — Z12.11 SCREEN FOR COLON CANCER: ICD-10-CM

## 2022-06-17 DIAGNOSIS — I10 ESSENTIAL HYPERTENSION, BENIGN: ICD-10-CM

## 2022-06-17 DIAGNOSIS — M17.11 PRIMARY OSTEOARTHRITIS OF RIGHT KNEE: ICD-10-CM

## 2022-06-17 DIAGNOSIS — M54.50 CHRONIC LEFT-SIDED LOW BACK PAIN WITHOUT SCIATICA: ICD-10-CM

## 2022-06-17 PROCEDURE — 99215 OFFICE O/P EST HI 40 MIN: CPT | Performed by: INTERNAL MEDICINE

## 2022-06-17 PROCEDURE — 90471 IMMUNIZATION ADMIN: CPT | Performed by: INTERNAL MEDICINE

## 2022-06-17 PROCEDURE — 90739 HEPB VACC 2/4 DOSE ADULT IM: CPT | Performed by: INTERNAL MEDICINE

## 2022-06-17 RX ORDER — DULAGLUTIDE 3 MG/.5ML
3 INJECTION, SOLUTION SUBCUTANEOUS WEEKLY
Qty: 4 PEN | Refills: 3 | Status: SHIPPED | OUTPATIENT
Start: 2022-06-17 | End: 2022-10-21

## 2022-06-17 RX ORDER — AMLODIPINE BESYLATE 5 MG/1
TABLET ORAL
Qty: 30 TABLET | Refills: 5 | Status: SHIPPED | OUTPATIENT
Start: 2022-06-17

## 2022-06-17 RX ORDER — ATORVASTATIN CALCIUM 40 MG/1
TABLET, FILM COATED ORAL
Qty: 30 TABLET | Refills: 5 | Status: SHIPPED | OUTPATIENT
Start: 2022-06-17

## 2022-06-17 RX ORDER — SPIRONOLACTONE 100 MG/1
TABLET, FILM COATED ORAL
Qty: 30 TABLET | Refills: 4 | Status: SHIPPED | OUTPATIENT
Start: 2022-06-17

## 2022-06-17 RX ORDER — ATENOLOL 50 MG/1
25 TABLET ORAL DAILY
Qty: 30 TABLET | Refills: 5 | Status: SHIPPED | OUTPATIENT
Start: 2022-06-17

## 2022-06-17 ASSESSMENT — ENCOUNTER SYMPTOMS
BACK PAIN: 1
SHORTNESS OF BREATH: 0

## 2022-06-17 NOTE — PATIENT INSTRUCTIONS
Mammogram  [id number]    Labs  Call Dr. Glenn Barger for follow up appointment  Increase to metformin 2000 mg per day  Resume Trulicity    Hep b shot today and in one month    Shingrix is 2 shots over a 6 month period due in future      Colonoscopy  [id number]    Memory foam mattress    orthopedics referral    Lumbar xray  [address]

## 2022-07-13 ENCOUNTER — HOSPITAL ENCOUNTER (OUTPATIENT)
Dept: MAMMOGRAPHY | Age: 57
Discharge: HOME | End: 2022-07-13
Payer: COMMERCIAL

## 2022-07-13 ENCOUNTER — HOSPITAL ENCOUNTER (OUTPATIENT)
Dept: GENERAL RADIOLOGY | Age: 57
Discharge: HOME | End: 2022-07-13
Payer: COMMERCIAL

## 2022-07-13 VITALS — WEIGHT: 280 LBS | BODY MASS INDEX: 49.61 KG/M2 | HEIGHT: 63 IN

## 2022-07-13 DIAGNOSIS — Z12.31 ENCOUNTER FOR SCREENING MAMMOGRAM FOR MALIGNANT NEOPLASM OF BREAST: ICD-10-CM

## 2022-07-13 DIAGNOSIS — G89.29 CHRONIC LEFT-SIDED LOW BACK PAIN WITHOUT SCIATICA: ICD-10-CM

## 2022-07-13 DIAGNOSIS — M54.50 CHRONIC LEFT-SIDED LOW BACK PAIN WITHOUT SCIATICA: ICD-10-CM

## 2022-07-13 PROCEDURE — 77067 SCR MAMMO BI INCL CAD: CPT

## 2022-07-13 PROCEDURE — 72100 X-RAY EXAM L-S SPINE 2/3 VWS: CPT

## 2022-08-12 ENCOUNTER — ANESTHESIA (OUTPATIENT)
Dept: ENDOSCOPY | Age: 57
End: 2022-08-12
Payer: COMMERCIAL

## 2022-08-12 ENCOUNTER — HOSPITAL ENCOUNTER (OUTPATIENT)
Age: 57
Setting detail: OUTPATIENT SURGERY
Discharge: HOME | End: 2022-08-12
Attending: INTERNAL MEDICINE | Admitting: INTERNAL MEDICINE
Payer: COMMERCIAL

## 2022-08-12 ENCOUNTER — ANESTHESIA EVENT (OUTPATIENT)
Dept: ENDOSCOPY | Age: 57
End: 2022-08-12
Payer: COMMERCIAL

## 2022-08-12 VITALS
TEMPERATURE: 97.2 F | HEART RATE: 64 BPM | OXYGEN SATURATION: 99 % | SYSTOLIC BLOOD PRESSURE: 117 MMHG | DIASTOLIC BLOOD PRESSURE: 69 MMHG | BODY MASS INDEX: 49.61 KG/M2 | RESPIRATION RATE: 16 BRPM | WEIGHT: 280 LBS | HEIGHT: 63 IN

## 2022-08-12 DIAGNOSIS — Z86.010 HISTORY OF COLON POLYPS: ICD-10-CM

## 2022-08-12 LAB
GLUCOSE BLD-MCNC: 121 MG/DL (ref 70–99)
PERFORMED ON: ABNORMAL

## 2022-08-12 PROCEDURE — 7100000011 HC PHASE II RECOVERY - ADDTL 15 MIN: Performed by: INTERNAL MEDICINE

## 2022-08-12 PROCEDURE — 2580000003 HC RX 258: Performed by: ANESTHESIOLOGY

## 2022-08-12 PROCEDURE — 88305 TISSUE EXAM BY PATHOLOGIST: CPT

## 2022-08-12 PROCEDURE — 2500000003 HC RX 250 WO HCPCS: Performed by: NURSE ANESTHETIST, CERTIFIED REGISTERED

## 2022-08-12 PROCEDURE — 3700000000 HC ANESTHESIA ATTENDED CARE: Performed by: INTERNAL MEDICINE

## 2022-08-12 PROCEDURE — 3609010600 HC COLONOSCOPY POLYPECTOMY SNARE/COLD BIOPSY: Performed by: INTERNAL MEDICINE

## 2022-08-12 PROCEDURE — 7100000010 HC PHASE II RECOVERY - FIRST 15 MIN: Performed by: INTERNAL MEDICINE

## 2022-08-12 PROCEDURE — 6360000002 HC RX W HCPCS: Performed by: NURSE ANESTHETIST, CERTIFIED REGISTERED

## 2022-08-12 PROCEDURE — 3700000001 HC ADD 15 MINUTES (ANESTHESIA): Performed by: INTERNAL MEDICINE

## 2022-08-12 PROCEDURE — 2709999900 HC NON-CHARGEABLE SUPPLY: Performed by: INTERNAL MEDICINE

## 2022-08-12 RX ORDER — SODIUM CHLORIDE, SODIUM LACTATE, POTASSIUM CHLORIDE, CALCIUM CHLORIDE 600; 310; 30; 20 MG/100ML; MG/100ML; MG/100ML; MG/100ML
INJECTION, SOLUTION INTRAVENOUS CONTINUOUS
Status: DISCONTINUED | OUTPATIENT
Start: 2022-08-12 | End: 2022-08-12 | Stop reason: HOSPADM

## 2022-08-12 RX ORDER — LIDOCAINE HYDROCHLORIDE 20 MG/ML
INJECTION, SOLUTION INFILTRATION; PERINEURAL PRN
Status: DISCONTINUED | OUTPATIENT
Start: 2022-08-12 | End: 2022-08-12 | Stop reason: SDUPTHER

## 2022-08-12 RX ORDER — SODIUM CHLORIDE 0.9 % (FLUSH) 0.9 %
5-40 SYRINGE (ML) INJECTION PRN
Status: DISCONTINUED | OUTPATIENT
Start: 2022-08-12 | End: 2022-08-12 | Stop reason: HOSPADM

## 2022-08-12 RX ORDER — SODIUM CHLORIDE 0.9 % (FLUSH) 0.9 %
5-40 SYRINGE (ML) INJECTION EVERY 12 HOURS SCHEDULED
Status: DISCONTINUED | OUTPATIENT
Start: 2022-08-12 | End: 2022-08-12 | Stop reason: HOSPADM

## 2022-08-12 RX ORDER — SODIUM CHLORIDE 9 MG/ML
INJECTION, SOLUTION INTRAVENOUS PRN
Status: DISCONTINUED | OUTPATIENT
Start: 2022-08-12 | End: 2022-08-12 | Stop reason: HOSPADM

## 2022-08-12 RX ORDER — LIDOCAINE HYDROCHLORIDE 10 MG/ML
1 INJECTION, SOLUTION EPIDURAL; INFILTRATION; INTRACAUDAL; PERINEURAL
Status: DISCONTINUED | OUTPATIENT
Start: 2022-08-12 | End: 2022-08-12 | Stop reason: HOSPADM

## 2022-08-12 RX ORDER — PROPOFOL 10 MG/ML
INJECTION, EMULSION INTRAVENOUS CONTINUOUS PRN
Status: DISCONTINUED | OUTPATIENT
Start: 2022-08-12 | End: 2022-08-12 | Stop reason: SDUPTHER

## 2022-08-12 RX ORDER — PROPOFOL 10 MG/ML
INJECTION, EMULSION INTRAVENOUS PRN
Status: DISCONTINUED | OUTPATIENT
Start: 2022-08-12 | End: 2022-08-12 | Stop reason: SDUPTHER

## 2022-08-12 RX ADMIN — PROPOFOL 150 MCG/KG/MIN: 10 INJECTION, EMULSION INTRAVENOUS at 11:17

## 2022-08-12 RX ADMIN — PROPOFOL 100 MG: 10 INJECTION, EMULSION INTRAVENOUS at 11:17

## 2022-08-12 RX ADMIN — SODIUM CHLORIDE, POTASSIUM CHLORIDE, SODIUM LACTATE AND CALCIUM CHLORIDE: 600; 310; 30; 20 INJECTION, SOLUTION INTRAVENOUS at 11:03

## 2022-08-12 RX ADMIN — LIDOCAINE HYDROCHLORIDE 100 MG: 20 INJECTION, SOLUTION INFILTRATION; PERINEURAL at 11:17

## 2022-08-12 ASSESSMENT — PAIN SCALES - GENERAL
PAINLEVEL_OUTOF10: 0

## 2022-08-12 ASSESSMENT — PAIN - FUNCTIONAL ASSESSMENT: PAIN_FUNCTIONAL_ASSESSMENT: 0-10

## 2022-08-12 NOTE — ANESTHESIA POSTPROCEDURE EVALUATION
Department of Anesthesiology  Postprocedure Note    Patient: Denisse Lisa  MRN: [id number]  YOB: 1965  Date of evaluation: 8/12/2022      Procedure Summary     Date: 08/12/22 Room / Location: [facility]    Anesthesia Start: 1114 Anesthesia Stop: 5900    Procedure: COLONOSCOPY POLYPECTOMY SNARE/COLD BIOPSY Diagnosis:       History of colon polyps      (History of colon polyps [Z86.010])    Surgeons: Cande Harvey MD Responsible Provider: Toi Interiano DO    Anesthesia Type: MAC ASA Status: 3          Anesthesia Type: No value filed.     Gualberto Phase I: Gualberto Score: 10    Gualberto Phase II: Gualberto Score: 7      Anesthesia Post Evaluation    Patient location during evaluation: PACU  Patient participation: complete - patient participated  Level of consciousness: awake  Pain score: 0  Airway patency: patent  Nausea & Vomiting: no nausea and no vomiting  Complications: no  Cardiovascular status: blood pressure returned to baseline  Respiratory status: acceptable  Hydration status: euvolemic  Multimodal analgesia pain management approach

## 2022-08-12 NOTE — PROCEDURES
[address] [place] [address]/[facility]  Colonoscopy Note    Patient: Alfonso Nino  : 1965  Acct#:     Procedure: Colonoscopy with biopsy     Date:  2022    Surgeon:  Gabriel Winters MD    Referring Physician:  Abdifatah Story MD    Anesthesia: IV propofol, per anesthesia    EBL: <50 mL    Indications: This is a 55y.o. year old female who presents today for surveillance for Phx colon polyps     Procedure: An informed consent was obtained from the patient after explanation of indications, benefits, possible risks and complications of the procedure. The patient was then taken to the endoscopy suite, placed in the left lateral decubitus position, and the above IV anesthesia was administered. A digital rectal examination was performed and revealed negative without mass, lesions or tenderness. The Olympus [id number]-H190 video colonoscope was placed in the patient's rectum under digital direction and advanced to the cecum. The cecum was identified by characteristic anatomy and ballottment. The prep was adequate. Findings:  A 2 mm sessile polyp in ascending colon removed with biopsy forceps. The colon was otherwise normal.      The scope was then withdrawn into the rectum and retroflexed. The retroflexed view of the anal verge and rectum demonstrates no hemorrhoids. The scope was straightened, the colon was decompressed and the scope was withdrawn from the patient. The patient tolerated the procedure well and was taken to the PACU in good condition. Biopsies:  Yes      Impression:   A 2 mm sessile polyp in ascending colon removed with biopsy forceps. The colon was otherwise normal.    Recommendations:  Await pathology results. Colonoscopy in 5 years.     Abdifatah Story MD  [facility]

## 2022-08-12 NOTE — ANESTHESIA PRE PROCEDURE
Department of Anesthesiology  Preprocedure Note       Name:  Shiva Warner   Age:  55 y.o.  :  1965                                          MRN:  [id number]         Date:  2022      Surgeon: Koffi Reynaga):  Margarita Diehl MD    Procedure: Procedure(s):  COLONOSCOPY    Medications prior to admission:   Prior to Admission medications    Medication Sig Start Date End Date Taking? Authorizing Provider   atenolol (TENORMIN) 50 MG tablet Take 0.5 tablets by mouth daily 22   Geneva Rhodes MD   atorvastatin (LIPITOR) 40 MG tablet TAKE ONE TABLET BY MOUTH DAILY 22   Geneva Rhodes MD   Dulaglutide (TRULICITY) 3 NICKI/2.0HC SOPN Inject 3 mg into the skin once a week 22   Geneva Rhodes MD   spironolactone (ALDACTONE) 100 MG tablet TAKE ONE TABLET BY MOUTH DAILY 22   Geneva Rhodes MD   amLODIPine (NORVASC) 5 MG tablet TAKE ONE TABLET BY MOUTH DAILY 22   Geneva Rhodes MD   metFORMIN (GLUCOPHAGE-XR) 500 mg extended release tablet TAKE FOUR TABLETS BY MOUTH DAILY WITH BREAKFAST 22   Geneva Rhodes MD   OneTouch Delica Lancets 90Z MISC Test 3 times daily E11.9 21   Geneva Rhodes MD   Insulin Pen Needle 29G X 12.7MM MISC 1 each by Does not apply route daily 21   Geneva Rhodes MD   ONETOUCH ULTRA strip USE TO TEST THREE TIMES A DAY 3/24/21   Geneva Rhodes MD   fluticasone (FLONASE) 50 MCG/ACT nasal spray 1 spray by Each Nostril route daily as needed for Rhinitis    Historical Provider, MD   Blood Glucose Monitoring Suppl (ACURA BLOOD GLUCOSE METER) w/Device KIT Inject 1 Applicatorful into the skin 2 times daily (before meals) 10/5/18   [facility], DO       Current medications:    No current facility-administered medications for this encounter.      Current Outpatient Medications   Medication Sig Dispense Refill    atenolol (TENORMIN) 50 MG tablet Take 0.5 tablets by mouth daily 30 tablet 5    atorvastatin (LIPITOR) 40 MG tablet TAKE ONE TABLET BY MOUTH DAILY 30 tablet 5    Dulaglutide (TRULICITY) 3 NG/3.6WV SOPN Inject 3 mg into the skin once a week 4 pen 3    spironolactone (ALDACTONE) 100 MG tablet TAKE ONE TABLET BY MOUTH DAILY 30 tablet 4    amLODIPine (NORVASC) 5 MG tablet TAKE ONE TABLET BY MOUTH DAILY 30 tablet 5    metFORMIN (GLUCOPHAGE-XR) 500 mg extended release tablet TAKE FOUR TABLETS BY MOUTH DAILY WITH BREAKFAST 120 tablet 3    OneTouch Delica Lancets 78L MISC Test 3 times daily E11.9 100 each 10    Insulin Pen Needle 29G X 12.7MM MISC 1 each by Does not apply route daily 100 each 3    ONETOUCH ULTRA strip USE TO TEST THREE TIMES A  strip 1    fluticasone (FLONASE) 50 MCG/ACT nasal spray 1 spray by Each Nostril route daily as needed for Rhinitis      Blood Glucose Monitoring Suppl (ACURA BLOOD GLUCOSE METER) w/Device KIT Inject 1 Applicatorful into the skin 2 times daily (before meals) 1 kit 0       Allergies:     Allergies   Allergen Reactions    Prednisone Other (See Comments)     Made her feel loopy       Problem List:    Patient Active Problem List   Diagnosis Code    Hyperlipidemia E78.5    Essential hypertension, benign I10    ZA (obstructive sleep apnea) G47.33    Perimenopause N95.1    Vitamin D deficiency E55.9    Left ankle pain M25.572    Type 2 diabetes mellitus with microalbuminuria ([facility] [address] E11.29, R80.9    Knee pain, chronic M25.569, G89.29    Iron deficiency anemia due to chronic blood loss D50.0    Mild intermittent asthma without complication H44.74    Colon polyps K63.5    Abnormal nuclear cardiac imaging test R93.1    Elevated serum protein level R77.9    Allergic rhinitis J30.9    Diabetes mellitus type 2, uncontrolled, with complications (HCC) R92.3, E11.65    Essential hypertension I10    Alopecia L65.9    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    Pneumonia J18.9    COVID-19 U07.1    MADDIE (acute kidney 06/17/2022 01:54 PM    K 4.6 06/17/2022 01:54 PM    K 4.1 11/06/2020 07:52 AM     06/17/2022 01:54 PM    CO2 24 06/17/2022 01:54 PM    BUN 15 06/17/2022 01:54 PM    CREATININE 1.1 06/17/2022 01:54 PM    GFRAA >60 06/17/2022 01:54 PM    GFRAA >60 05/21/2013 10:09 AM    AGRATIO 1.3 06/17/2022 01:54 PM    LABGLOM 51 06/17/2022 01:54 PM    GLUCOSE 164 06/17/2022 01:54 PM    PROT 7.9 06/17/2022 01:54 PM    PROT 8.2 05/14/2012 10:28 AM    CALCIUM 9.5 06/17/2022 01:54 PM    BILITOT 0.3 06/17/2022 01:54 PM    ALKPHOS 83 06/17/2022 01:54 PM    AST 12 06/17/2022 01:54 PM    ALT 14 06/17/2022 01:54 PM       POC Tests: No results for input(s): POCGLU, POCNA, POCK, POCCL, POCBUN, POCHEMO, POCHCT in the last 72 hours.     Coags:   Lab Results   Component Value Date/Time    PROTIME 12.7 11/11/2020 04:30 AM    INR 1.09 11/11/2020 04:30 AM       HCG (If Applicable): No results found for: PREGTESTUR, PREGSERUM, HCG, HCGQUANT     ABGs: No results found for: PHART, PO2ART, GKK1KDL, BBQ6JPL, BEART, F1HBNNKN     Type & Screen (If Applicable):  No results found for: LABABO, LABRH    Drug/Infectious Status (If Applicable):  No results found for: HIV, HEPCAB    COVID-19 Screening (If Applicable): No results found for: COVID19        Anesthesia Evaluation  Patient summary reviewed and Nursing notes reviewed no history of anesthetic complications:   Airway: Mallampati: IV  TM distance: >3 FB   Neck ROM: full  Mouth opening: > = 3 FB   Dental: normal exam         Pulmonary:   (+) sleep apnea:  decreased breath sounds: bilateral asthma:     (-) pneumonia                           Cardiovascular:  Exercise tolerance: good (>4 METS),   (+) hypertension:,     (-)  angina      Rhythm: regular  Rate: normal           Beta Blocker:  Not on Beta Blocker         Neuro/Psych:               GI/Hepatic/Renal:   (+) renal disease: CRI, morbid obesity          Endo/Other:    (+) DiabetesType II DM, , blood dyscrasia: anemia:., electrolyte abnormalities, . Abdominal:   (+) obese,     Abdomen: soft. Vascular: Other Findings:           Anesthesia Plan      MAC     ASA 3       Induction: intravenous. Anesthetic plan and risks discussed with patient. Plan discussed with CRNA.                     Mirna Michelle DO   8/12/2022

## 2022-08-12 NOTE — H&P
Gastroenterology Note                 Pre-operative History and Physical    Patient: Brenda Mcgowan  : 1965  CSN:     History Obtained From:   Patient or guardian. HISTORY OF PRESENT ILLNESS:    The patient is a 55 y.o. female here for colonoscopy for Phx colon polyps     Past Medical History:    Past Medical History:   Diagnosis Date    Allergic rhinitis     Alopecia     COVID-19 2020    [facility]     TYPE 2    Fibroids, intramural 2011    History of uterine fibroid     Hyperaldosteronism ([facility]     ? Hyperlipidemia     ZA (obstructive sleep apnea) 2011    [facility] eval     Osteoarthritis of right knee     secondary hypertension     Vitamin D deficiency 2012     Past Surgical History:    Past Surgical History:   Procedure Laterality Date     SECTION      x3    COLONOSCOPY      fu 5 years    TUBAL LIGATION      BTL     Medications Prior to Admission:   No current facility-administered medications on file prior to encounter.      Current Outpatient Medications on File Prior to Encounter   Medication Sig Dispense Refill    atenolol (TENORMIN) 50 MG tablet Take 0.5 tablets by mouth daily 30 tablet 5    atorvastatin (LIPITOR) 40 MG tablet TAKE ONE TABLET BY MOUTH DAILY 30 tablet 5    Dulaglutide (TRULICITY) 3 DG/7.1VZ SOPN Inject 3 mg into the skin once a week 4 pen 3    spironolactone (ALDACTONE) 100 MG tablet TAKE ONE TABLET BY MOUTH DAILY 30 tablet 4    amLODIPine (NORVASC) 5 MG tablet TAKE ONE TABLET BY MOUTH DAILY 30 tablet 5    metFORMIN (GLUCOPHAGE-XR) 500 mg extended release tablet TAKE FOUR TABLETS BY MOUTH DAILY WITH BREAKFAST 120 tablet 3    OneTouch Delica Lancets 99E MISC Test 3 times daily E11.9 100 each 10    Insulin Pen Needle 29G X 12.7MM MISC 1 each by Does not apply route daily 100 each 3    ONETOUCH ULTRA strip USE TO TEST THREE TIMES A  strip 1 fluticasone (FLONASE) 50 MCG/ACT nasal spray 1 spray by Each Nostril route daily as needed for Rhinitis      Blood Glucose Monitoring Suppl (ACURA BLOOD GLUCOSE METER) w/Device KIT Inject 1 Applicatorful into the skin 2 times daily (before meals) 1 kit 0        Allergies:  Prednisone      Social History:   Social History     Tobacco Use    Smoking status: Never    Smokeless tobacco: Never    Tobacco comments:     passive smoke exposure    Substance Use Topics    Alcohol use: Yes     Alcohol/week: 2.0 standard drinks     Types: 2 Glasses of wine per week     Comment: occasionally      Family History:   Family History   Problem Relation Age of Onset    Diabetes Mother         DM 2    Heart Attack Mother     Heart Disease Mother     High Blood Pressure Mother     Cancer Father         Lung    Stroke Father 48        smoker    No Known Problems Brother     Hypertension Sister     Hypertension Brother     No Known Problems Maternal Aunt     No Known Problems Maternal Uncle     No Known Problems Paternal Aunt     No Known Problems Paternal Uncle     No Known Problems Maternal Grandmother     No Known Problems Maternal Grandfather     No Known Problems Paternal Grandmother     No Known Problems Paternal Grandfather     No Known Problems Other     Breast Cancer Maternal Cousin     Rheum Arthritis Neg Hx     Osteoarthritis Neg Hx     Asthma Neg Hx     Heart Failure Neg Hx     High Cholesterol Neg Hx     Migraines Neg Hx     Ovarian Cancer Neg Hx     Rashes/Skin Problems Neg Hx     Seizures Neg Hx     Thyroid Disease Neg Hx        PHYSICAL EXAM:      BP (!) 142/97   Pulse 69   Temp 97.4 °F (36.3 °C) (Temporal)   Ht 5' 3\" (1.6 m)   Wt 280 lb (127 kg)   LMP  (LMP Unknown)   SpO2 99%   BMI 49.60 kg/m²  I        Heart:  RRR, normal s1s2    Lungs:  CTA and normal effort    Abdomen:   Soft, nt nd. ASSESSMENT AND PLAN:    1. Patient is a 55 y.o. female here for endoscopy with MAC sedation.     2.  Procedure options, risks and benefits reviewed with patient and/or guardian. They express understanding.      Sarahy Mederos MD  [address] [place] [address]

## 2022-08-12 NOTE — DISCHARGE INSTRUCTIONS
ENDOSCOPY DISCHARGE INSTRUCTIONS:    Call the physician that did your procedure for any questions or concern:    [facility]: [phone]  DR. MIRANDA OVERTON      ACTIVITY:    There are potential side effects to the medications used for sedation and anesthesia during your procedure. These include:  Dizziness or light-headedness, confusion or memory loss, delayed reaction times, loss of coordination, nausea and vomiting. Because of your increased risk for injury, we ask that you observe the following precautions: For the next 24 hours,  DO NOT operate an automobile, bicycle, motorcycle, , power tools or large equipment of any kind. Do not drink alcohol, sign any legal documents or make any legal decisions for 24 hours. Do not bend your head over lower than your heart. DO sit on the side of bed/couch awhile before getting up. Plan on bedrest or quiet relaxation today. You may resume normal activities in 24 hours. DIET:    Your first meal today should be light, avoiding spicy and fatty foods. If you tolerate this first meal, then you may advance to your regular diet unless otherwise advised by your physician. NORMAL SYMPTOMS:  -Mild sore throat if youve had an EGD   -Gaseous discomfort    NOTIFY YOUR PHYSICIAN IF THESE SYMPTOMS OCCUR:  1. Fever (greater than 100)  5. Increased abdominal bloating  2. Severe pain    6. Excessive bleeding  3. Nausea and vomiting  7. Chest pain                                                                    4. Chills    8. Shortness of breath    ADDITIONAL INSTRUCTIONS:    Biopsy results: Call [address],[facility] for biopsy results in 1 week    Educational Information:Impression:   A 2 mm sessile polyp in ascending colon removed with biopsy forceps. The colon was otherwise normal.     Recommendations:  Await pathology results. Colonoscopy in 5 years.           Please review these discharge instructions this evening or tomorrow for  information you may have forgotten. We want to thank you for choosing the [facility] as your health care provider. We always strive to provide you with excellent care while you are here. You may receive a survey in the mail regarding your care. We would appreciate you taking a few minutes of your time to complete this survey. ENDOSCOPY DISCHARGE INSTRUCTIONS:    Call the physician that did your procedure for any questions or concern:    [facility]: [phone]  DR. MIRANDA OVERTON      ACTIVITY:    There are potential side effects to the medications used for sedation and anesthesia during your procedure. These include:  Dizziness or light-headedness, confusion or memory loss, delayed reaction times, loss of coordination, nausea and vomiting. Because of your increased risk for injury, we ask that you observe the following precautions: For the next 24 hours,  DO NOT operate an automobile, bicycle, motorcycle, , power tools or large equipment of any kind. Do not drink alcohol, sign any legal documents or make any legal decisions for 24 hours. Do not bend your head over lower than your heart. DO sit on the side of bed/couch awhile before getting up. Plan on bedrest or quiet relaxation today. You may resume normal activities in 24 hours. DIET:    Your first meal today should be light, avoiding spicy and fatty foods. If you tolerate this first meal, then you may advance to your regular diet unless otherwise advised by your physician. NORMAL SYMPTOMS:  -Mild sore throat if youve had an EGD   -Gaseous discomfort    NOTIFY YOUR PHYSICIAN IF THESE SYMPTOMS OCCUR:  1. Fever (greater than 100)  5. Increased abdominal bloating  2. Severe pain    6. Excessive bleeding  3. Nausea and vomiting  7. Chest pain                                                                    4. Chills    8.  Shortness of breath    ADDITIONAL INSTRUCTIONS:    Biopsy results: Call [facility] for biopsy results in 1 week    Educational Information:          Please review these discharge instructions this evening or tomorrow for  information you may have forgotten. We want to thank you for choosing the [facility] as your health care provider. We always strive to provide you with excellent care while you are here. You may receive a survey in the mail regarding your care. We would appreciate you taking a few minutes of your time to complete this survey. Colon Polyps: Care Instructions  Your Care Instructions     Colon polyps are growths in the colon or the rectum. The cause of most colon polyps is not known, and most people who get them do not have any problems. But a certain kind can turn into cancer. For this reason, regular testing for colon polyps is important for people as they get older. It is also important foranyone who has an increased risk for colon cancer. Polyps are usually found through routine colon cancer screening tests. Although most colon polyps are not cancerous, they are usually removed and then tested for cancer. Screening for colon cancer saves lives because the cancer canusually be cured if it is caught early. If you have a polyp that is the type that can turn into cancer, you may need more tests to examine your entire colon. The doctor will remove any otherpolyps that he or she finds, and you will be tested more often. Follow-up care is a key part of your treatment and safety. Be sure to make and go to all appointments, and call your doctor if you are having problems. It's also a good idea to know your test results and keep alist of the medicines you take. How can you care for yourself at home? Regular exams to look for colon polyps are the best way to prevent polyps from turning into colon cancer. These can include stool tests, sigmoidoscopy, colonoscopy, and CT colonography.  Talk with your doctor about a testingschedule that is right for you.  To prevent polyps  There is no home treatment that can prevent colon polyps. But these steps mayhelp lower your risk for cancer. Stay active. Being active can help you get to and stay at a healthy weight. Try to exercise on most days of the week. Walking is a good choice. Eat well. Choose a variety of vegetables, fruits, legumes (such as peas and beans), fish, poultry, and whole grains. Do not smoke. If you need help quitting, talk to your doctor about stop-smoking programs and medicines. These can increase your chances of quitting for good. If you drink alcohol, limit how much you drink. Limit alcohol to 2 drinks a day for men and 1 drink a day for women. When should you call for help? Call your doctor now or seek immediate medical care if:    You have severe belly pain. Your stools are maroon or very bloody. Watch closely for changes in your health, and be sure to contact your doctor if:    You have a fever. You have nausea or vomiting. You have a change in bowel habits (new constipation or diarrhea). Your symptoms get worse or are not improving as expected.

## 2022-09-19 ENCOUNTER — TELEPHONE (OUTPATIENT)
Dept: INTERNAL MEDICINE CLINIC | Age: 57
End: 2022-09-19

## 2022-09-19 ENCOUNTER — OFFICE VISIT (OUTPATIENT)
Dept: INTERNAL MEDICINE CLINIC | Age: 57
End: 2022-09-19
Payer: COMMERCIAL

## 2022-09-19 VITALS
BODY MASS INDEX: 45.81 KG/M2 | WEIGHT: 258.6 LBS | HEART RATE: 72 BPM | OXYGEN SATURATION: 96 % | DIASTOLIC BLOOD PRESSURE: 72 MMHG | SYSTOLIC BLOOD PRESSURE: 132 MMHG

## 2022-09-19 DIAGNOSIS — E78.5 HYPERLIPIDEMIA, UNSPECIFIED HYPERLIPIDEMIA TYPE: ICD-10-CM

## 2022-09-19 DIAGNOSIS — I10 ESSENTIAL HYPERTENSION, BENIGN: ICD-10-CM

## 2022-09-19 LAB — HBA1C MFR BLD: 7.9 %

## 2022-09-19 PROCEDURE — 90471 IMMUNIZATION ADMIN: CPT | Performed by: INTERNAL MEDICINE

## 2022-09-19 PROCEDURE — 90472 IMMUNIZATION ADMIN EACH ADD: CPT | Performed by: INTERNAL MEDICINE

## 2022-09-19 PROCEDURE — 3051F HG A1C>EQUAL 7.0%<8.0%: CPT | Performed by: INTERNAL MEDICINE

## 2022-09-19 PROCEDURE — 83036 HEMOGLOBIN GLYCOSYLATED A1C: CPT | Performed by: INTERNAL MEDICINE

## 2022-09-19 PROCEDURE — 90739 HEPB VACC 2/4 DOSE ADULT IM: CPT | Performed by: INTERNAL MEDICINE

## 2022-09-19 PROCEDURE — 99214 OFFICE O/P EST MOD 30 MIN: CPT | Performed by: INTERNAL MEDICINE

## 2022-09-19 PROCEDURE — 90674 CCIIV4 VAC NO PRSV 0.5 ML IM: CPT | Performed by: INTERNAL MEDICINE

## 2022-09-19 SDOH — ECONOMIC STABILITY: FOOD INSECURITY: WITHIN THE PAST 12 MONTHS, THE FOOD YOU BOUGHT JUST DIDN'T LAST AND YOU DIDN'T HAVE MONEY TO GET MORE.: NEVER TRUE

## 2022-09-19 SDOH — ECONOMIC STABILITY: FOOD INSECURITY: WITHIN THE PAST 12 MONTHS, YOU WORRIED THAT YOUR FOOD WOULD RUN OUT BEFORE YOU GOT MONEY TO BUY MORE.: NEVER TRUE

## 2022-09-19 ASSESSMENT — PATIENT HEALTH QUESTIONNAIRE - PHQ9
SUM OF ALL RESPONSES TO PHQ QUESTIONS 1-9: 0
2. FEELING DOWN, DEPRESSED OR HOPELESS: 0
SUM OF ALL RESPONSES TO PHQ9 QUESTIONS 1 & 2: 0
SUM OF ALL RESPONSES TO PHQ QUESTIONS 1-9: 0
1. LITTLE INTEREST OR PLEASURE IN DOING THINGS: 0

## 2022-09-19 ASSESSMENT — SOCIAL DETERMINANTS OF HEALTH (SDOH): HOW HARD IS IT FOR YOU TO PAY FOR THE VERY BASICS LIKE FOOD, HOUSING, MEDICAL CARE, AND HEATING?: NOT HARD AT ALL

## 2022-09-19 NOTE — TELEPHONE ENCOUNTER
Tell patient that Lafaye Reach had been stopped in the past due to cost.  I am again sending it to her pharmacy to bring down her A1c. Tell her to please let me know if it is affordable or not. If not, we will try to find coupons or something else in that class of drugs.

## 2022-09-19 NOTE — PROGRESS NOTES
Brenda Mcgowan (:  1965) is a 55 y.o. female, here for evaluation of the following chief complaint(s):    Follow-up (No new concerns )      ASSESSMENT/PLAN:  1. Diabetes mellitus type 2, uncontrolled, with complications (HCC)  Check I6F on Trulicity, metformin. Has lost weight since last visit. Will check with Dr. Leoncio Farias, which she previously took but was stopped due to ckd. -     POCT glycosylated hemoglobin (Hb A1C) - 7.9  2. Essential hypertension, benign  Well-controlled on atenolol, spironolactone, amlodipine. Not taking ACE or ARB as per nephrology. 3. Hyperlipidemia, unspecified hyperlipidemia type  Stable on atorvastatin  4. HM -flu and hep B 2 today. Prevnar next. -  CKD-has appointment next week with Dr. Castillo Ogden    Return in about 6 months (around 3/19/2023). SUBJECTIVE/OBJECTIVE:  HPI  Patient is here for routine visit. Overall she is feeling improved. She has lost weight since last visit. Her back is doing somewhat better. She has not yet tried the lumbar corset. she did not see the orthopedist about her knee. She did get her mammogram and colonoscopy. Her Pap smear is coming up next week. Review of Systems    Past Medical History:   Diagnosis Date    Allergic rhinitis     Alopecia     COVID-19 2020    Diabetes mellitus type 2, uncontrolled, with complications ([facility]     TYPE 2    Fibroids, intramural 2011    History of uterine fibroid     Hyperaldosteronism ([facility] [address]     ?     Hyperlipidemia     ZA (obstructive sleep apnea) 2011    [facility] sleep eval     Osteoarthritis of right knee     secondary hypertension     Vitamin D deficiency 2012       Current Outpatient Medications   Medication Sig Dispense Refill    atenolol (TENORMIN) 50 MG tablet Take 0.5 tablets by mouth daily 30 tablet 5    atorvastatin (LIPITOR) 40 MG tablet TAKE ONE TABLET BY MOUTH DAILY 30 tablet 5    Dulaglutide (TRULICITY) 3 TN/3.7XX SOPN Inject 3 mg into the skin once a week 4 pen 3    spironolactone (ALDACTONE) 100 MG tablet TAKE ONE TABLET BY MOUTH DAILY 30 tablet 4    amLODIPine (NORVASC) 5 MG tablet TAKE ONE TABLET BY MOUTH DAILY 30 tablet 5    metFORMIN (GLUCOPHAGE-XR) 500 mg extended release tablet TAKE FOUR TABLETS BY MOUTH DAILY WITH BREAKFAST 120 tablet 3    OneTouch Delica Lancets 62D MISC Test 3 times daily E11.9 100 each 10    Insulin Pen Needle 29G X 12.7MM MISC 1 each by Does not apply route daily 100 each 3    ONETOUCH ULTRA strip USE TO TEST THREE TIMES A  strip 1    fluticasone (FLONASE) 50 MCG/ACT nasal spray 1 spray by Each Nostril route daily as needed for Rhinitis      Blood Glucose Monitoring Suppl (ACURA BLOOD GLUCOSE METER) w/Device KIT Inject 1 Applicatorful into the skin 2 times daily (before meals) 1 kit 0     No current facility-administered medications for this visit. Physical Exam  Vitals reviewed. Constitutional:       General: She is not in acute distress. HENT:      Head: Normocephalic and atraumatic. Cardiovascular:      Rate and Rhythm: Normal rate and regular rhythm. Pulmonary:      Effort: Pulmonary effort is normal.      Breath sounds: Normal breath sounds. Musculoskeletal:      Right lower leg: No edema. Left lower leg: No edema. Neurological:      General: No focal deficit present. Mental Status: She is alert and oriented to person, place, and time. Psychiatric:         Mood and Affect: Mood normal.             This note was generated completely or in part utilizing Dragon dictation speech recognition software. Occasionally, words are mistranscribed and despite editing, the text may contain inaccuracies due to incorrect word recognition. If further clarification is needed please contact the office at [phone]          An electronic signature was used to authenticate this note.     --Jason Hutchinson MD

## 2022-09-19 NOTE — PATIENT INSTRUCTIONS
Shingrix and new covid booster at pharmacy    Flu and Hep B here today  Prevnar next    A1c    Retinal exam due

## 2022-09-27 ENCOUNTER — OFFICE VISIT (OUTPATIENT)
Dept: GYNECOLOGY | Age: 57
End: 2022-09-27
Payer: COMMERCIAL

## 2022-09-27 VITALS
DIASTOLIC BLOOD PRESSURE: 84 MMHG | RESPIRATION RATE: 17 BRPM | WEIGHT: 259.8 LBS | SYSTOLIC BLOOD PRESSURE: 124 MMHG | OXYGEN SATURATION: 99 % | HEIGHT: 63 IN | HEART RATE: 74 BPM | BODY MASS INDEX: 46.03 KG/M2

## 2022-09-27 DIAGNOSIS — Z01.419 WELL WOMAN EXAM WITH ROUTINE GYNECOLOGICAL EXAM: Primary | ICD-10-CM

## 2022-09-27 PROCEDURE — 99386 PREV VISIT NEW AGE 40-64: CPT | Performed by: OBSTETRICS & GYNECOLOGY

## 2022-09-28 ASSESSMENT — ENCOUNTER SYMPTOMS
RESPIRATORY NEGATIVE: 1
ALLERGIC/IMMUNOLOGIC NEGATIVE: 1
GASTROINTESTINAL NEGATIVE: 1
EYES NEGATIVE: 1

## 2022-10-21 RX ORDER — DULAGLUTIDE 3 MG/.5ML
INJECTION, SOLUTION SUBCUTANEOUS
Qty: 2 ML | Refills: 3 | Status: SHIPPED | OUTPATIENT
Start: 2022-10-21

## 2022-12-12 RX ORDER — METFORMIN HYDROCHLORIDE 500 MG/1
TABLET, EXTENDED RELEASE ORAL
Qty: 120 TABLET | Refills: 3 | Status: SHIPPED | OUTPATIENT
Start: 2022-12-12

## 2023-01-14 DIAGNOSIS — I10 ESSENTIAL HYPERTENSION, BENIGN: ICD-10-CM

## 2023-01-16 RX ORDER — SPIRONOLACTONE 100 MG/1
TABLET, FILM COATED ORAL
Qty: 30 TABLET | Refills: 4 | Status: SHIPPED | OUTPATIENT
Start: 2023-01-16

## 2023-01-23 DIAGNOSIS — E78.5 HYPERLIPIDEMIA, UNSPECIFIED HYPERLIPIDEMIA TYPE: ICD-10-CM

## 2023-01-23 RX ORDER — ATORVASTATIN CALCIUM 40 MG/1
TABLET, FILM COATED ORAL
Qty: 30 TABLET | Refills: 5 | Status: SHIPPED | OUTPATIENT
Start: 2023-01-23

## 2023-01-24 DIAGNOSIS — N18.30 STAGE 3 CHRONIC KIDNEY DISEASE, UNSPECIFIED WHETHER STAGE 3A OR 3B CKD (HCC): ICD-10-CM

## 2023-01-24 LAB
ALBUMIN SERPL-MCNC: 4.1 G/DL (ref 3.4–5)
ANION GAP SERPL CALCULATED.3IONS-SCNC: 12 MMOL/L (ref 3–16)
BUN BLDV-MCNC: 17 MG/DL (ref 7–20)
CALCIUM SERPL-MCNC: 9.3 MG/DL (ref 8.3–10.6)
CHLORIDE BLD-SCNC: 100 MMOL/L (ref 99–110)
CO2: 26 MMOL/L (ref 21–32)
CREAT SERPL-MCNC: 1 MG/DL (ref 0.6–1.1)
CREATININE URINE: 97.2 MG/DL (ref 28–259)
GFR SERPL CREATININE-BSD FRML MDRD: >60 ML/MIN/{1.73_M2}
GLUCOSE BLD-MCNC: 137 MG/DL (ref 70–99)
MICROALBUMIN UR-MCNC: 4.6 MG/DL
MICROALBUMIN/CREAT UR-RTO: 47.3 MG/G (ref 0–30)
PHOSPHORUS: 3.3 MG/DL (ref 2.5–4.9)
POTASSIUM SERPL-SCNC: 4.7 MMOL/L (ref 3.5–5.1)
SODIUM BLD-SCNC: 138 MMOL/L (ref 136–145)

## 2023-02-01 NOTE — PROGRESS NOTES
Positive PCR COVID-19 test fax received from [facility] [address], [place], [phone]. Hard copy on patients chart. 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

## 2023-02-11 DIAGNOSIS — I10 ESSENTIAL HYPERTENSION, BENIGN: ICD-10-CM

## 2023-02-13 RX ORDER — EMPAGLIFLOZIN 10 MG/1
TABLET, FILM COATED ORAL
Qty: 90 TABLET | Refills: 1 | OUTPATIENT
Start: 2023-02-13

## 2023-02-13 RX ORDER — AMLODIPINE BESYLATE 5 MG/1
TABLET ORAL
Qty: 30 TABLET | Refills: 5 | Status: SHIPPED | OUTPATIENT
Start: 2023-02-13

## 2023-03-31 ENCOUNTER — OFFICE VISIT (OUTPATIENT)
Dept: INTERNAL MEDICINE CLINIC | Age: 58
End: 2023-03-31
Payer: COMMERCIAL

## 2023-03-31 VITALS
DIASTOLIC BLOOD PRESSURE: 78 MMHG | BODY MASS INDEX: 45.88 KG/M2 | OXYGEN SATURATION: 94 % | WEIGHT: 259 LBS | HEART RATE: 82 BPM | SYSTOLIC BLOOD PRESSURE: 134 MMHG

## 2023-03-31 DIAGNOSIS — M25.512 CHRONIC LEFT SHOULDER PAIN: ICD-10-CM

## 2023-03-31 DIAGNOSIS — E78.5 HYPERLIPIDEMIA, UNSPECIFIED HYPERLIPIDEMIA TYPE: ICD-10-CM

## 2023-03-31 DIAGNOSIS — G89.29 CHRONIC PAIN OF RIGHT KNEE: Primary | ICD-10-CM

## 2023-03-31 DIAGNOSIS — M25.561 CHRONIC PAIN OF RIGHT KNEE: Primary | ICD-10-CM

## 2023-03-31 DIAGNOSIS — G89.29 CHRONIC LEFT SHOULDER PAIN: ICD-10-CM

## 2023-03-31 DIAGNOSIS — I10 ESSENTIAL HYPERTENSION, BENIGN: ICD-10-CM

## 2023-03-31 DIAGNOSIS — E11.9 TYPE 2 DIABETES MELLITUS WITHOUT COMPLICATION, WITHOUT LONG-TERM CURRENT USE OF INSULIN (HCC): ICD-10-CM

## 2023-03-31 PROCEDURE — 3078F DIAST BP <80 MM HG: CPT | Performed by: INTERNAL MEDICINE

## 2023-03-31 PROCEDURE — 3074F SYST BP LT 130 MM HG: CPT | Performed by: INTERNAL MEDICINE

## 2023-03-31 PROCEDURE — 99214 OFFICE O/P EST MOD 30 MIN: CPT | Performed by: INTERNAL MEDICINE

## 2023-03-31 SDOH — ECONOMIC STABILITY: INCOME INSECURITY: HOW HARD IS IT FOR YOU TO PAY FOR THE VERY BASICS LIKE FOOD, HOUSING, MEDICAL CARE, AND HEATING?: NOT VERY HARD

## 2023-03-31 SDOH — ECONOMIC STABILITY: FOOD INSECURITY: WITHIN THE PAST 12 MONTHS, THE FOOD YOU BOUGHT JUST DIDN'T LAST AND YOU DIDN'T HAVE MONEY TO GET MORE.: NEVER TRUE

## 2023-03-31 SDOH — ECONOMIC STABILITY: HOUSING INSECURITY
IN THE LAST 12 MONTHS, WAS THERE A TIME WHEN YOU DID NOT HAVE A STEADY PLACE TO SLEEP OR SLEPT IN A SHELTER (INCLUDING NOW)?: NO

## 2023-03-31 SDOH — ECONOMIC STABILITY: FOOD INSECURITY: WITHIN THE PAST 12 MONTHS, YOU WORRIED THAT YOUR FOOD WOULD RUN OUT BEFORE YOU GOT MONEY TO BUY MORE.: NEVER TRUE

## 2023-03-31 ASSESSMENT — PATIENT HEALTH QUESTIONNAIRE - PHQ9
8. MOVING OR SPEAKING SO SLOWLY THAT OTHER PEOPLE COULD HAVE NOTICED. OR THE OPPOSITE, BEING SO FIGETY OR RESTLESS THAT YOU HAVE BEEN MOVING AROUND A LOT MORE THAN USUAL: 2
3. TROUBLE FALLING OR STAYING ASLEEP: 3
2. FEELING DOWN, DEPRESSED OR HOPELESS: 0
SUM OF ALL RESPONSES TO PHQ QUESTIONS 1-9: 12
5. POOR APPETITE OR OVEREATING: 1
4. FEELING TIRED OR HAVING LITTLE ENERGY: 3
9. THOUGHTS THAT YOU WOULD BE BETTER OFF DEAD, OR OF HURTING YOURSELF: 0
10. IF YOU CHECKED OFF ANY PROBLEMS, HOW DIFFICULT HAVE THESE PROBLEMS MADE IT FOR YOU TO DO YOUR WORK, TAKE CARE OF THINGS AT HOME, OR GET ALONG WITH OTHER PEOPLE: 0
SUM OF ALL RESPONSES TO PHQ QUESTIONS 1-9: 12
SUM OF ALL RESPONSES TO PHQ QUESTIONS 1-9: 12
SUM OF ALL RESPONSES TO PHQ9 QUESTIONS 1 & 2: 3
7. TROUBLE CONCENTRATING ON THINGS, SUCH AS READING THE NEWSPAPER OR WATCHING TELEVISION: 0
SUM OF ALL RESPONSES TO PHQ QUESTIONS 1-9: 12
1. LITTLE INTEREST OR PLEASURE IN DOING THINGS: 3
6. FEELING BAD ABOUT YOURSELF - OR THAT YOU ARE A FAILURE OR HAVE LET YOURSELF OR YOUR FAMILY DOWN: 0

## 2023-03-31 NOTE — PROGRESS NOTES
secondary hypertension     Vitamin D deficiency 05/22/2012       Current Outpatient Medications   Medication Sig Dispense Refill    amLODIPine (NORVASC) 5 MG tablet TAKE ONE TABLET BY MOUTH DAILY 30 tablet 5    atorvastatin (LIPITOR) 40 MG tablet TAKE ONE TABLET BY MOUTH DAILY 30 tablet 5    spironolactone (ALDACTONE) 100 MG tablet TAKE ONE TABLET BY MOUTH DAILY 30 tablet 4    metFORMIN (GLUCOPHAGE-XR) 500 MG extended release tablet TAKE FOUR TABLETS BY MOUTH DAILY WITH BREAKFAST 403 tablet 3    TRULICITY 3 RC/4.9XQ SOPN INJECT 3 MG UNDER THE SKIN ONCE WEEKLY 2 mL 3    atenolol (TENORMIN) 50 MG tablet Take 0.5 tablets by mouth daily 30 tablet 5    empagliflozin (JARDIANCE) 10 MG tablet Take 1 tablet by mouth daily (Patient not taking: Reported on 3/31/2023) 30 tablet 2    OneTouch Delica Lancets 00V MISC Test 3 times daily E11.9 100 each 10    Insulin Pen Needle 29G X 12.7MM MISC 1 each by Does not apply route daily 100 each 3    ONETOUCH ULTRA strip USE TO TEST THREE TIMES A  strip 1    fluticasone (FLONASE) 50 MCG/ACT nasal spray 1 spray by Each Nostril route daily as needed for Rhinitis (Patient not taking: Reported on 3/31/2023)      Blood Glucose Monitoring Suppl (ACURA BLOOD GLUCOSE METER) w/Device KIT Inject 1 Applicatorful into the skin 2 times daily (before meals) 1 kit 0     No current facility-administered medications for this visit. Physical Exam  Vitals reviewed. Constitutional:       General: She is not in acute distress. HENT:      Head: Normocephalic and atraumatic. Cardiovascular:      Rate and Rhythm: Normal rate and regular rhythm. Pulses: Normal pulses. Heart sounds: Normal heart sounds. Abdominal:      General: Abdomen is flat. Bowel sounds are normal. There is no distension. Tenderness: There is no abdominal tenderness.    Musculoskeletal:      Comments: Left shoulder reduced range of motion  Right knee without crepitance   Neurological:      General: No focal

## 2023-03-31 NOTE — PATIENT INSTRUCTIONS
Shingrix shot is due- here or pharmacy (check [place])    Covid booster due    Get my labs when you get tindni labs

## 2023-04-17 RX ORDER — DULAGLUTIDE 3 MG/.5ML
INJECTION, SOLUTION SUBCUTANEOUS
Qty: 2 ML | Refills: 3 | Status: SHIPPED | OUTPATIENT
Start: 2023-04-17

## 2023-05-15 ENCOUNTER — TELEPHONE (OUTPATIENT)
Dept: INTERNAL MEDICINE CLINIC | Age: 58
End: 2023-05-15

## 2023-05-15 ENCOUNTER — OFFICE VISIT (OUTPATIENT)
Dept: INTERNAL MEDICINE CLINIC | Age: 58
End: 2023-05-15
Payer: COMMERCIAL

## 2023-05-15 VITALS
WEIGHT: 255 LBS | OXYGEN SATURATION: 96 % | BODY MASS INDEX: 45.17 KG/M2 | SYSTOLIC BLOOD PRESSURE: 126 MMHG | DIASTOLIC BLOOD PRESSURE: 74 MMHG | HEART RATE: 70 BPM

## 2023-05-15 DIAGNOSIS — M54.42 ACUTE LEFT-SIDED LOW BACK PAIN WITH LEFT-SIDED SCIATICA: Primary | ICD-10-CM

## 2023-05-15 PROCEDURE — 3078F DIAST BP <80 MM HG: CPT | Performed by: INTERNAL MEDICINE

## 2023-05-15 PROCEDURE — 99213 OFFICE O/P EST LOW 20 MIN: CPT | Performed by: INTERNAL MEDICINE

## 2023-05-15 PROCEDURE — 3074F SYST BP LT 130 MM HG: CPT | Performed by: INTERNAL MEDICINE

## 2023-05-15 RX ORDER — BACLOFEN 10 MG/1
10 TABLET ORAL 2 TIMES DAILY PRN
Qty: 20 TABLET | Refills: 0 | Status: SHIPPED | OUTPATIENT
Start: 2023-05-15 | End: 2023-05-25

## 2023-05-15 RX ORDER — IBUPROFEN 600 MG/1
600 TABLET ORAL 3 TIMES DAILY PRN
Qty: 9 TABLET | Refills: 0 | Status: SHIPPED | OUTPATIENT
Start: 2023-05-15 | End: 2023-05-18

## 2023-05-15 NOTE — TELEPHONE ENCOUNTER
Patient is calling with concern of pain in her left leg from her Hip to her knee since yesterday. Pain level is at an 8. There is no known injury associated with this pain. Patient states she has had to keep her movements to a minimum due to the pain. Please advise on scheduling.

## 2023-05-15 NOTE — TELEPHONE ENCOUNTER
Reviewed schedules no appointments available sooner.  Please schedule patient for 5 per Dr Lindsey Bower

## 2023-05-15 NOTE — PATIENT INSTRUCTIONS
Massage    Physical therapy    Ibuprofen with food, just for a few days    Muscle relaxer    Call if not improving.

## 2023-05-15 NOTE — PROGRESS NOTES
Ramesh Dey (:  1965) is a 65 y.o. female, here for evaluation of the following chief complaint(s):    Hip Pain (Woke up yesterday with left hip/buttock pain. )      ASSESSMENT/PLAN:  1. Acute left-sided low back pain with left-sided sciatica  -     ibuprofen (ADVIL;MOTRIN) 600 MG tablet; Take 1 tablet by mouth 3 times daily as needed for Pain, Disp-9 tablet, R-0Normal  -     baclofen (LIORESAL) 10 MG tablet; Take 1 tablet by mouth 2 times daily as needed (low back pain) May cause drowsiness. , Disp-20 tablet, R-0Normal  -     [facility] Physical Therapy - [facility]    Return if symptoms worsen or fail to improve. May consider Medrol Dosepak, pain medicine, lumbar MRI. SUBJECTIVE/OBJECTIVE:  HPI  Patient complains of discomfort from her left lateral buttocks to her left lateral thigh and down to her knee. Today the pain is 8 out of 10 and yesterday it was 10 out of 10. It is affecting her walking. She denies urinary incontinence. She denies injury. She took some ibuprofen which she thinks helped. She denies fever. She denies rash. She denies dysuria. Reviewed 22 xray  Grade 1 anterolisthesis of L4 on L5. Degenerative disc disease at this level. Hypertrophic degenerative facet arthropathy of the posterior elements from L3 to S1. Review of Systems    Past Medical History:   Diagnosis Date    Allergic rhinitis     Alopecia     COVID-19 2020    Diabetes mellitus type 2, uncontrolled, with complications     TYPE 2    Fibroids, intramural 2011    History of uterine fibroid     Hyperaldosteronism ([facility] [address]     ?     Hyperlipidemia     ZA (obstructive sleep apnea) 2011    [facility] sleep eval     Osteoarthritis of right knee     secondary hypertension     Vitamin D deficiency 2012       Current Outpatient Medications   Medication Sig Dispense Refill    ibuprofen (ADVIL;MOTRIN) 600 MG tablet Take 1 tablet by mouth 3 times daily as needed for

## 2023-05-26 DIAGNOSIS — E11.9 TYPE 2 DIABETES MELLITUS WITHOUT COMPLICATION, WITHOUT LONG-TERM CURRENT USE OF INSULIN (HCC): ICD-10-CM

## 2023-05-26 DIAGNOSIS — E78.5 HYPERLIPIDEMIA, UNSPECIFIED HYPERLIPIDEMIA TYPE: ICD-10-CM

## 2023-05-26 LAB
ALBUMIN SERPL-MCNC: 4 G/DL (ref 3.4–5)
ALP SERPL-CCNC: 72 U/L (ref 40–129)
ALT SERPL-CCNC: 11 U/L (ref 10–40)
AST SERPL-CCNC: 11 U/L (ref 15–37)
BILIRUB DIRECT SERPL-MCNC: <0.2 MG/DL (ref 0–0.3)
BILIRUB INDIRECT SERPL-MCNC: ABNORMAL MG/DL (ref 0–1)
BILIRUB SERPL-MCNC: <0.2 MG/DL (ref 0–1)
CHOLEST SERPL-MCNC: 113 MG/DL (ref 0–199)
HDLC SERPL-MCNC: 43 MG/DL (ref 40–60)
LDLC SERPL CALC-MCNC: 54 MG/DL
PROT SERPL-MCNC: 7.3 G/DL (ref 6.4–8.2)
TRIGL SERPL-MCNC: 79 MG/DL (ref 0–150)
VLDLC SERPL CALC-MCNC: 16 MG/DL

## 2023-05-27 LAB
EST. AVERAGE GLUCOSE BLD GHB EST-MCNC: 159.9 MG/DL
HBA1C MFR BLD: 7.2 %

## 2023-06-07 ENCOUNTER — HOSPITAL ENCOUNTER (OUTPATIENT)
Dept: PHYSICAL THERAPY | Age: 58
Setting detail: THERAPIES SERIES
Discharge: HOME | End: 2023-06-07

## 2023-06-07 NOTE — FLOWSHEET NOTE
The [address] [place],[address], [address], [facility]      Physical Therapy  Cancellation/No-show Note  Patient Name:  Yue Biggs  :  1965   Date:  2023  Cancelled visits to date: 0  No-shows to date: 1    For today's appointment patient:  []  Cancelled  []  Rescheduled appointment  [x]  No-show     Reason given by patient:  []  Patient ill  []  Conflicting appointment  []  No transportation    []  Conflict with work  [x]  No reason given  []  Other:     Comments:      Electronically signed by:  Filiberto Gannon, PT

## 2023-06-28 DIAGNOSIS — I10 ESSENTIAL HYPERTENSION, BENIGN: ICD-10-CM

## 2023-06-29 RX ORDER — ATENOLOL 50 MG/1
TABLET ORAL
Qty: 15 TABLET | Refills: 5 | Status: SHIPPED | OUTPATIENT
Start: 2023-06-29

## 2023-07-06 ENCOUNTER — OFFICE VISIT (OUTPATIENT)
Dept: INTERNAL MEDICINE CLINIC | Age: 58
End: 2023-07-06
Payer: COMMERCIAL

## 2023-07-06 VITALS
OXYGEN SATURATION: 97 % | SYSTOLIC BLOOD PRESSURE: 118 MMHG | WEIGHT: 260 LBS | DIASTOLIC BLOOD PRESSURE: 80 MMHG | BODY MASS INDEX: 46.06 KG/M2 | HEART RATE: 73 BPM | RESPIRATION RATE: 16 BRPM

## 2023-07-06 DIAGNOSIS — E11.9 TYPE 2 DIABETES MELLITUS WITHOUT COMPLICATION, WITHOUT LONG-TERM CURRENT USE OF INSULIN (HCC): Primary | ICD-10-CM

## 2023-07-06 DIAGNOSIS — G89.29 CHRONIC LEFT-SIDED LOW BACK PAIN WITHOUT SCIATICA: ICD-10-CM

## 2023-07-06 DIAGNOSIS — M54.50 CHRONIC LEFT-SIDED LOW BACK PAIN WITHOUT SCIATICA: ICD-10-CM

## 2023-07-06 DIAGNOSIS — Z12.31 ENCOUNTER FOR SCREENING MAMMOGRAM FOR MALIGNANT NEOPLASM OF BREAST: ICD-10-CM

## 2023-07-06 PROCEDURE — 3074F SYST BP LT 130 MM HG: CPT | Performed by: INTERNAL MEDICINE

## 2023-07-06 PROCEDURE — 90750 HZV VACC RECOMBINANT IM: CPT | Performed by: INTERNAL MEDICINE

## 2023-07-06 PROCEDURE — 90471 IMMUNIZATION ADMIN: CPT | Performed by: INTERNAL MEDICINE

## 2023-07-06 PROCEDURE — 3051F HG A1C>EQUAL 7.0%<8.0%: CPT | Performed by: INTERNAL MEDICINE

## 2023-07-06 PROCEDURE — 3078F DIAST BP <80 MM HG: CPT | Performed by: INTERNAL MEDICINE

## 2023-07-06 PROCEDURE — 99213 OFFICE O/P EST LOW 20 MIN: CPT | Performed by: INTERNAL MEDICINE

## 2023-07-06 NOTE — PROGRESS NOTES
Ramesh Dao (:  1965) is a 65 y.o. female, here for evaluation of the following chief complaint(s):    Follow-up ([facility] finally was able to [facility] yesterday 23)      ASSESSMENT/PLAN:  1. Type 2 diabetes mellitus without complication, without long-term current use of insulin (HCC)  Patient will restart Trulicity and continue metformin  -     Hemoglobin A1C; Future  -     Vitamin B12; Future  -     Diabetic Foot Exam  2. Encounter for screening mammogram for malignant neoplasm of breast  -     [facility] AYLEEN DIGITAL SCREEN BILATERAL; Future  3. Chronic left-sided low back pain without sciatica  -     [facility] Physical Therapy - [place]    Essential hypertension, benign  Well controlled on amlodipine, spironolactone, atenolol. Not taking ACE or ARB as per nephrology. Hyperlipidemia, unspecified hyperlipidemia type  Stable on atorvastatin    CKD-saw Dr. River Fuentes but gfr has improved  Return in about 6 months (around 2024). SUBJECTIVE/OBJECTIVE:  HPI  Patient is here for follow-up appointment. Her right knee is feeling better. She has not taken Trulicity lately but plans to restart. Otherwise she is compliant with medication and is without new complaints. Review of Systems  Chronic low back pain    Past Medical History:   Diagnosis Date    Allergic rhinitis     Alopecia     COVID-19 2020    Diabetes mellitus type 2, uncontrolled, with complications     TYPE 2    Fibroids, intramural 2011    History of uterine fibroid     Hyperaldosteronism ([address])     ?     Hyperlipidemia     AZ (obstructive sleep apnea) 2011    [facility] sleep eval     Osteoarthritis of right knee     secondary hypertension     Vitamin D deficiency 2012       Current Outpatient Medications   Medication Sig Dispense Refill    atenolol (TENORMIN) 50 MG tablet TAKE 1/2 TABLET BY MOUTH DAILY 15 tablet 5    TRULICITY 3 OM/8.0SL SOPN INJECT 3 MG UNDER THE SKIN ONCE

## 2023-07-10 RX ORDER — METFORMIN HYDROCHLORIDE 500 MG/1
TABLET, EXTENDED RELEASE ORAL
Qty: 120 TABLET | Refills: 3 | Status: SHIPPED | OUTPATIENT
Start: 2023-07-10

## 2023-07-21 ENCOUNTER — HOSPITAL ENCOUNTER (OUTPATIENT)
Dept: PHYSICAL THERAPY | Age: 58
Setting detail: THERAPIES SERIES
Discharge: HOME | End: 2023-07-21
Payer: COMMERCIAL

## 2023-07-21 PROCEDURE — 97110 THERAPEUTIC EXERCISES: CPT

## 2023-07-21 PROCEDURE — 97161 PT EVAL LOW COMPLEX 20 MIN: CPT

## 2023-07-21 NOTE — THERAPY EVALUATION
3-        Ankle Plantarflex  (S1,2) 3- 3-        Ankle Inversion 4 4        Ankle Eversion 3- 3-        Great Toe Ext  (L5)            [x] Not Tested  Trunk Strength     Trunk Extensors Fair    Gluteals Fair -    Abdominals poor        Flexibility    [] All tested [facility]    [] Deficits indicated as follows:    Muscle Abnormal Findings   Hip flexors/Nadeem  []Decreased R   []Decreased L      Hamstrings  Degrees in 90/90 []Decreased R   []Decreased L     Right:  -10            Left:   -10   Gastrocs   [x]Decreased R   [x]Decreased L      Obers/TFL/ITB   []Decreased R   []Decreased L      Piriformis    []Decreased R   []Decreased L      Other:    []Decreased R   []Decreased L        Special Tests Lumbosacral and hip- supine/sidelying/prone    (L) = Klaus's Criteria: 2 positive tests  Special Test Abnormal Findings   Sit up test/ Supine Long sit test  (C) []Neg   []Pos R   []Pos L     Comments:    SI distraction                               (L) []Neg   []Pos   []NT   Thigh Thrust test                         (L) []Neg   []Pos R   []Pos L      90/90 test  []Neg   []Pos R   []Pos L      Gaenslen's test []Neg   []Pos R   []Pos L      Straight Leg Raise [x]Neg   []Pos R   []Pos L      Crams []Neg   []Pos R   []Pos L      Lumbar Distraction  []Relief noted   []No relief noted  []Rebound pain   []NT   Hip scour []Neg   []Pos R   []Pos L      Kvng's test []Neg   []Pos R   []Pos L      Baljeet's test []Neg   []Pos R   []Pos L      Oscillation []Neg   []Pos R   []Pos L      Ant/Post Provocation  []Neg   []Pos R   []Pos L      SI compression                           (L) []Neg   []Pos      Prone knee flexion test               (C) []Neg   []Pos R   []Pos L     Comments:    Femoral nerve tension test []Neg   []Pos R   []Pos L      Pheasant test []Neg   []Pos R   []Pos L      Sacral thrusts                              (L) []Neg   []Pos     []Base   []Spofford   []R Sacral Sulcus  []L Sacral Sulcus   []R DELICIA   []L DELICIA     Palpation

## 2023-07-21 NOTE — FLOWSHEET NOTE
[facility] Outpatient Therapy  [address] [place] [address], [id number] [place] [address], [address] [place] [address] Avenue  Phone: [phone]   Fax: [phone]    Physical Therapy Treatment Note/ Progress Report:     Date:  2023     Patient Name:  Elvia Schwab    :  1965  MRN: [id number]    Medical Diagnosis Information:  Chronic left-sided low back pain without sciatica [M54.50, G89.29]  Treatment Diagnosis Information:  Treatment Diagnosis: M54.50 L LBP, M62.81 general weakness  Insurance/Certification information:  PT Insurance Information: BCBS  Physician Information:  Ree Money*   Plan of care signed:    [] Yes  [x] No    Date of Patient follow up with Physician:      Progress Report: []  Yes  [x]  No   If Yes, Date Range for reporting period:  Beginnin23  Ending:      Progress report due (10 Rx/or 30 days whichever is less): 44     Recertification due (POC duration/ or 90 days whichever is less): 23     Visit # Insurance Allowable Auth Needed   20/yr, NO Estim [x]Yes   []No     RESTRICTIONS/PRECAUTIONS: [place]  Latex Allergy:  [x]NO      []YES  Preferred Language for Healthcare:   [x]English       []other:    Functional Scale:23 LAYLA 50 (2%)    Pain level:  0-3/10 intermittent sharp shooting pain in back/buttock     SUBJECTIVE:  See eval    OBJECTIVE: See eval  Observation: decreased dynamic LE alignment, hips IR, severe flat feet with toe out gait sequence  Test measurements:      Exercises/Interventions: Exercises in bold performed in department today. Items not bolded are carried forward from prior visits for continuity of the record.     Exercise/Equipment Resistance/Repetitions HEP Other comments     []    Calf stretch  []    Heel toe raises  []      []    bridges x10 []    SLR x10 []      []      []    Supine posterior tilts 10 sec x 10 []    Standing TrA 10 sec x 10 []      []      []      []      []      []      []      []      []      Home Exercise

## 2023-08-02 ENCOUNTER — HOSPITAL ENCOUNTER (OUTPATIENT)
Dept: MAMMOGRAPHY | Age: 58
Discharge: HOME | End: 2023-08-02
Payer: COMMERCIAL

## 2023-08-02 VITALS — HEIGHT: 63 IN | BODY MASS INDEX: 46.07 KG/M2 | WEIGHT: 260 LBS

## 2023-08-02 DIAGNOSIS — Z12.31 ENCOUNTER FOR SCREENING MAMMOGRAM FOR MALIGNANT NEOPLASM OF BREAST: ICD-10-CM

## 2023-08-02 PROCEDURE — 77067 SCR MAMMO BI INCL CAD: CPT

## 2023-08-03 ENCOUNTER — OFFICE VISIT (OUTPATIENT)
Dept: INTERNAL MEDICINE CLINIC | Age: 58
End: 2023-08-03
Payer: COMMERCIAL

## 2023-08-03 VITALS
SYSTOLIC BLOOD PRESSURE: 134 MMHG | OXYGEN SATURATION: 99 % | DIASTOLIC BLOOD PRESSURE: 82 MMHG | HEART RATE: 64 BPM | TEMPERATURE: 98.4 F

## 2023-08-03 DIAGNOSIS — M25.512 LEFT SHOULDER PAIN, UNSPECIFIED CHRONICITY: Primary | ICD-10-CM

## 2023-08-03 PROCEDURE — 3074F SYST BP LT 130 MM HG: CPT | Performed by: INTERNAL MEDICINE

## 2023-08-03 PROCEDURE — 99214 OFFICE O/P EST MOD 30 MIN: CPT | Performed by: INTERNAL MEDICINE

## 2023-08-03 PROCEDURE — 3078F DIAST BP <80 MM HG: CPT | Performed by: INTERNAL MEDICINE

## 2023-08-03 RX ORDER — PREDNISONE 20 MG/1
20 TABLET ORAL DAILY
Qty: 5 TABLET | Refills: 0 | Status: SHIPPED | OUTPATIENT
Start: 2023-08-03 | End: 2023-08-08

## 2023-08-03 RX ORDER — ORAL SEMAGLUTIDE 7 MG/1
7 TABLET ORAL DAILY
Qty: 30 TABLET | Refills: 0 | Status: SHIPPED | OUTPATIENT
Start: 2023-08-03 | End: 2023-09-02

## 2023-08-03 NOTE — PATIENT INSTRUCTIONS
Ibuprofen up to 600 mg every 8 hours for next 2 days with food  If not improving, sending Prednisone 20 mg daily for 5 days  Tylenol up to 1000 mg every 8 hours for pain    Shoulder xray  [email or web address]  Labs    Rybelsus 3 mg daily for 30 days-samples  Then increase to 7 mg. Call if pricey.

## 2023-08-03 NOTE — PROGRESS NOTES
Kanchan Bejarano (:  1965) is a 65 y.o. female, here for evaluation of the following chief complaint(s):    Arm Pain (Left arm pain more so when she lifts and it throbs and tingles down to her wrist for the last 2 weeks. Also [id number]      ASSESSMENT/PLAN:  1. Left shoulder pain, unspecified chronicity  Suspect synovitis, possibly postviral versus rotator cuff tear. Advised labs and shoulder x-ray. Advised ibuprofen but if not improving I did send prednisone to her pharmacy. It is a low dose of prednisone so the benefits that should outweigh the risks. -     Sedimentation Rate; Future  -     C-Reactive Protein; Future  -     XR SHOULDER LEFT (MIN 2 VIEWS); Future  -     predniSONE (DELTASONE) 20 MG tablet; Take 1 tablet by mouth daily for 5 days, Disp-5 tablet, R-0Normal  2. Body mass index (BMI) 45.0-49.9, adult (HCC)  -Due to cost, [facility] [place] [facility]). 3 mg samples given. If tolerated and not too expensive, may increase to 7 mg. Keep 24 appt      SUBJECTIVE/OBJECTIVE:  HPI  Patient states her left arm throbs and hurts with all motion. Her neck has full range of motion. She denies chest pain or shortness of breath or breast discomfort. She denies fever. She denies injury. Symptoms have been happening for the past week. They persist despite changing sleeping positions. It feels like when she lifts her arm, that something might snap. She denies rash. Denies pain in other joints. Review of Systems    Past Medical History:   Diagnosis Date    Allergic rhinitis     Alopecia     COVID-19 2020    Diabetes mellitus type 2, uncontrolled, with complications     TYPE 2    Fibroids, intramural 2011    History of uterine fibroid     Hyperaldosteronism ([address])     ?     Hyperlipidemia     ZA (obstructive sleep apnea) 2011    [facility] sleep eval     Osteoarthritis of right knee     secondary hypertension     Vitamin D

## 2023-08-04 ENCOUNTER — TELEPHONE (OUTPATIENT)
Dept: INTERNAL MEDICINE CLINIC | Age: 58
End: 2023-08-04

## 2023-08-04 ENCOUNTER — HOSPITAL ENCOUNTER (OUTPATIENT)
Dept: GENERAL RADIOLOGY | Age: 58
Discharge: HOME | End: 2023-08-04
Payer: COMMERCIAL

## 2023-08-04 DIAGNOSIS — M25.512 LEFT SHOULDER PAIN, UNSPECIFIED CHRONICITY: ICD-10-CM

## 2023-08-04 DIAGNOSIS — E11.9 TYPE 2 DIABETES MELLITUS WITHOUT COMPLICATION, WITHOUT LONG-TERM CURRENT USE OF INSULIN (HCC): ICD-10-CM

## 2023-08-04 PROCEDURE — 73030 X-RAY EXAM OF SHOULDER: CPT

## 2023-08-04 NOTE — TELEPHONE ENCOUNTER
Max Merino from [facility] is calling stated that patient is allergic to predniSONE (Shabnam Kingfisher) 20 MG tablet    Would Dr. Cody Interiano still like this filled for patient?

## 2023-08-05 LAB
CRP SERPL-MCNC: 6.5 MG/L (ref 0–5.1)
ERYTHROCYTE [SEDIMENTATION RATE] IN BLOOD BY WESTERGREN METHOD: 60 MM/HR (ref 0–30)
VIT B12 SERPL-MCNC: 291 PG/ML (ref 211–911)

## 2023-08-06 RX ORDER — CHOLECALCIFEROL (VITAMIN D3) 125 MCG
500 CAPSULE ORAL DAILY
Qty: 30 TABLET | Refills: 3 | Status: SHIPPED | OUTPATIENT
Start: 2023-08-06 | End: 2024-08-05

## 2023-08-25 RX ORDER — BLOOD SUGAR DIAGNOSTIC
STRIP MISCELLANEOUS
Qty: 270 STRIP | Refills: 1 | Status: SHIPPED | OUTPATIENT
Start: 2023-08-25

## 2023-08-25 RX ORDER — LANCETS 33 GAUGE
EACH MISCELLANEOUS
Qty: 100 EACH | Refills: 10 | Status: SHIPPED | OUTPATIENT
Start: 2023-08-25

## 2023-09-11 DIAGNOSIS — I10 ESSENTIAL HYPERTENSION, BENIGN: ICD-10-CM

## 2023-09-11 RX ORDER — SPIRONOLACTONE 100 MG/1
TABLET, FILM COATED ORAL
Qty: 30 TABLET | Refills: 5 | Status: SHIPPED | OUTPATIENT
Start: 2023-09-11

## 2023-10-05 RX ORDER — ORAL SEMAGLUTIDE 7 MG/1
1 TABLET ORAL DAILY
Qty: 30 TABLET | Refills: 0 | Status: SHIPPED | OUTPATIENT
Start: 2023-10-05

## 2023-10-14 DIAGNOSIS — I10 ESSENTIAL HYPERTENSION, BENIGN: ICD-10-CM

## 2023-10-14 DIAGNOSIS — E78.5 HYPERLIPIDEMIA, UNSPECIFIED HYPERLIPIDEMIA TYPE: ICD-10-CM

## 2023-10-16 RX ORDER — AMLODIPINE BESYLATE 5 MG/1
TABLET ORAL
Qty: 30 TABLET | Refills: 5 | Status: SHIPPED | OUTPATIENT
Start: 2023-10-16

## 2023-10-16 RX ORDER — ATORVASTATIN CALCIUM 40 MG/1
TABLET, FILM COATED ORAL
Qty: 30 TABLET | Refills: 5 | Status: SHIPPED | OUTPATIENT
Start: 2023-10-16

## 2023-11-06 RX ORDER — ORAL SEMAGLUTIDE 7 MG/1
1 TABLET ORAL DAILY
Qty: 30 TABLET | Refills: 3 | Status: SHIPPED | OUTPATIENT
Start: 2023-11-06

## 2023-11-11 NOTE — PROGRESS NOTES
MD DAYO Katz Board Certified in Sleep Medicine  Certified [facility] Sleep Medicine  Board Certified in Neurology [address]  [address] [facility]  [address] [place] [address] [place] [facility] [address], [address]           [address]  [address] [facility] [address] [phone]    Subjective:     Patient ID: Angel Mora is a 49 y.o. female. Chief Complaint   Patient presents with    Follow-up     cpap supplies       HPI:        Angel Mora is a 49 y.o. female referred by Dr Brodie Moy for a sleep evaluation. She complains of snoring, snorting, periods of not breathing, excessive daytime sleepiness, feels sleepy during the day but she denies choking, knees buckling with laughing, completely or partially paralyzed while falling asleep or waking up, noisy environment, uncomfortable room temperature, uncomfortable bedding. Symptoms began several years ago, gradually worsening since that time. The patient's bed-partner confirmed the snoring and stopped breathing at night  SLEEP SCHEDULE: Goes to bed around 9 PM in the weekdays and 10 PM in the weekends. It usually takes the patient 60 minutes to fall asleep. The patient gets up 2 per night to go to the bathroom. The Patient finally gets up at 6 AM during the weekdays and 8 AM in the weekends. The patient has restless sleep with frequent arousals in addition to the Patient has significant daytime sleepiness. The Patient scored Total score: 16 on Leander Sleepiness Scale ( more than 10 is indicative of daytime sleepiness)and 36 in fatigue scale ( more than 36 is indicative of daytime fatigue). The patient takes no naps.       Previous evaluation and treatment has included- 9 years ago, she could not use the CPAP much    Had study done on 02/25/2011 which showed an AHI - 56.1/hr with Low MULTIVITAMIN-MINERALS) tablet Take 1 tablet by mouth daily    Historical Provider, MD       Allergies as of 2020 - Review Complete 2020   Allergen Reaction Noted    Prednisone Other (See Comments) 2012       Patient Active Problem List   Diagnosis    Hyperlipidemia    Essential hypertension, benign    ZA (obstructive sleep apnea)    Fibroids, intramural    Perimenopause    Vitamin D deficiency    Left ankle pain    Type 2 diabetes mellitus with microalbuminuria (HCC)    Knee pain, chronic    Iron deficiency anemia due to chronic blood loss    Mild intermittent asthma without complication    Colon polyps    Abnormal nuclear cardiac imaging test    Elevated serum protein level    Allergic rhinitis    Diabetes mellitus type 2, uncontrolled, with complications ([address]    Essential hypertension       Past Medical History:   Diagnosis Date    Allergic rhinitis     Diabetes mellitus type 2, uncontrolled, with complications ([address]     TYPE 2    Essential hypertension     Hyperlipidemia     ZA (obstructive sleep apnea) 2011    Sleep study done 2011 - supposed to use cpap    Vitamin D deficiency 2012       Past Surgical History:   Procedure Laterality Date     SECTION      x3    COLONOSCOPY  2017    fu 5 years    TUBAL LIGATION      BTL       Family History   Problem Relation Age of Onset    Diabetes Mother         DM 2    Heart Attack Mother     Heart Disease Mother     High Blood Pressure Mother     Cancer Father         Lung    Stroke Father 48        smoker    No Known Problems Brother     Hypertension Sister     Hypertension Brother     No Known Problems Maternal Aunt     No Known Problems Maternal Uncle     No Known Problems Paternal Aunt     No Known Problems Paternal Uncle     No Known Problems Maternal Grandmother     No Known Problems Maternal Grandfather     No Known Problems Paternal Grandmother     No Known Problems Paternal Grandfather     No Known Problems Other     Breast Cancer Maternal Cousin     Rheum Arthritis Neg Hx     Osteoarthritis Neg Hx     Asthma Neg Hx     Heart Failure Neg Hx     High Cholesterol Neg Hx     Migraines Neg Hx     Ovarian Cancer Neg Hx     Rashes/Skin Problems Neg Hx     Seizures Neg Hx     Thyroid Disease Neg Hx        Review of Systems   Constitutional: Positive for diaphoresis. HENT: Positive for congestion. Eyes: Negative. Respiratory: Positive for apnea, cough, choking and shortness of breath. Cardiovascular: Positive for leg swelling. Gastrointestinal: Negative. Endocrine: Negative. Genitourinary: Positive for frequency. Musculoskeletal: Positive for arthralgias and joint swelling. Skin: Negative. Allergic/Immunologic: Negative. Neurological: Negative. Negative for headaches. Hematological: Negative. Psychiatric/Behavioral: Negative. Negative for dysphoric mood. All other systems reviewed and are negative. Objective:     Vitals:  Weight BMI Neck circumference    Wt Readings from Last 3 Encounters:   02/17/20 279 lb (126.6 kg)   11/08/19 284 lb (128.8 kg)   10/18/19 284 lb 12.8 oz (129.2 kg)    Body mass index is 51.03 kg/m². BP HR SaO2   BP Readings from Last 3 Encounters:   02/17/20 120/88   11/08/19 (!) 146/90   10/03/19 (!) 151/95    Pulse Readings from Last 3 Encounters:   02/17/20 68   11/08/19 60   09/09/19 80    SpO2 Readings from Last 3 Encounters:   02/17/20 94%   11/08/19 95%   09/09/19 94%        The mandibular molar Class :   [x]1 []2 []3      Mallampati I Airway Classification:   []1 []2 [x]3 []4        Physical Exam  Vitals signs and nursing note reviewed. Constitutional:       Appearance: Normal appearance. HENT:      Head: Atraumatic.       Nose: Nose normal.      Mouth/Throat:      Comments: Mallampati class 3, no retrognathia or hypognathia , normal airflow in bilateral nostrils, no septum deviation , crowded oropharynx with low soft palate, high arched hard palate,no tonsils enlargement. Eyes:      Extraocular Movements: Extraocular movements intact. Neck:      Musculoskeletal: Normal range of motion. Cardiovascular:      Rate and Rhythm: Normal rate and regular rhythm. Heart sounds: Normal heart sounds. Pulmonary:      Effort: Pulmonary effort is normal.      Breath sounds: Normal breath sounds. Musculoskeletal: Normal range of motion. Skin:     General: Skin is warm. Neurological:      General: No focal deficit present. Psychiatric:         Mood and Affect: Mood normal.         Assessment:   Obstructive sleep apnea especially with snoring, snorting,  observed apnea, daytime sleepiness, large neck circumference, Mallampati class of 3 and obesity. Diagnosis Orders   1. Obstructive sleep apnea     2. Essential hypertension     3. Class 3 severe obesity due to excess calories with serious comorbidity and body mass index (BMI) of 50.0 to 59.9 in adult [facility])  Ambulatory referral to Bariatrics     Plan:     Patient was counseled about the pathophysiology of obstructive sleep apnea syndrome and the methods for evaluating its presence and severity. Patient was counseled to avoid driving and other potentially hazardous circumstances if the patient is experiencing excessive sleepiness. Treatment considerations include the use of nasal CPAP, oral dental appliance or a surgical intervention, which should be based on otolarygologic findings, In the meantime, the patient should be cautioned to avoid the use of alcohol or other depressant medications because of potential for increasing the duration and severity of apnea and cautioned regarding driving or operating and dangerous equipment if the patient is experiencing daytime sleepiness. .  Most likely treating the ZA will have position impact on HTN control. We discussed the proportionality between weight and AHI.   With 10% weight change, Hypothyroid

## 2023-11-17 RX ORDER — METFORMIN HYDROCHLORIDE 500 MG/1
TABLET, EXTENDED RELEASE ORAL
Qty: 120 TABLET | Refills: 3 | Status: SHIPPED | OUTPATIENT
Start: 2023-11-17

## 2024-01-11 ENCOUNTER — OFFICE VISIT (OUTPATIENT)
Dept: INTERNAL MEDICINE CLINIC | Age: 59
End: 2024-01-11
Payer: COMMERCIAL

## 2024-01-11 VITALS
OXYGEN SATURATION: 95 % | DIASTOLIC BLOOD PRESSURE: 78 MMHG | WEIGHT: 245 LBS | SYSTOLIC BLOOD PRESSURE: 120 MMHG | TEMPERATURE: 97.5 F | HEART RATE: 75 BPM | BODY MASS INDEX: 43.4 KG/M2

## 2024-01-11 DIAGNOSIS — E11.9 TYPE 2 DIABETES MELLITUS WITHOUT COMPLICATION, WITHOUT LONG-TERM CURRENT USE OF INSULIN (HCC): ICD-10-CM

## 2024-01-11 DIAGNOSIS — E78.5 HYPERLIPIDEMIA, UNSPECIFIED HYPERLIPIDEMIA TYPE: Primary | ICD-10-CM

## 2024-01-11 DIAGNOSIS — M25.512 CHRONIC LEFT SHOULDER PAIN: ICD-10-CM

## 2024-01-11 DIAGNOSIS — N18.30 STAGE 3 CHRONIC KIDNEY DISEASE, UNSPECIFIED WHETHER STAGE 3A OR 3B CKD (HCC): ICD-10-CM

## 2024-01-11 DIAGNOSIS — E78.5 HYPERLIPIDEMIA, UNSPECIFIED HYPERLIPIDEMIA TYPE: ICD-10-CM

## 2024-01-11 DIAGNOSIS — G89.29 CHRONIC LEFT SHOULDER PAIN: ICD-10-CM

## 2024-01-11 DIAGNOSIS — I10 ESSENTIAL HYPERTENSION, BENIGN: ICD-10-CM

## 2024-01-11 DIAGNOSIS — E66.01 OBESITY, CLASS III, BMI 40-49.9 (MORBID OBESITY) (HCC): ICD-10-CM

## 2024-01-11 PROBLEM — E11.22 TYPE 2 DIABETES MELLITUS WITH CHRONIC KIDNEY DISEASE (HCC): Status: ACTIVE | Noted: 2024-01-11

## 2024-01-11 LAB — ERYTHROCYTE [SEDIMENTATION RATE] IN BLOOD BY WESTERGREN METHOD: 33 MM/HR (ref 0–30)

## 2024-01-11 PROCEDURE — 90750 HZV VACC RECOMBINANT IM: CPT | Performed by: INTERNAL MEDICINE

## 2024-01-11 PROCEDURE — 3051F HG A1C>EQUAL 7.0%<8.0%: CPT | Performed by: INTERNAL MEDICINE

## 2024-01-11 PROCEDURE — 90471 IMMUNIZATION ADMIN: CPT | Performed by: INTERNAL MEDICINE

## 2024-01-11 PROCEDURE — 99214 OFFICE O/P EST MOD 30 MIN: CPT | Performed by: INTERNAL MEDICINE

## 2024-01-11 PROCEDURE — 3078F DIAST BP <80 MM HG: CPT | Performed by: INTERNAL MEDICINE

## 2024-01-11 PROCEDURE — 3074F SYST BP LT 130 MM HG: CPT | Performed by: INTERNAL MEDICINE

## 2024-01-11 ASSESSMENT — PATIENT HEALTH QUESTIONNAIRE - PHQ9
SUM OF ALL RESPONSES TO PHQ QUESTIONS 1-9: 0
SUM OF ALL RESPONSES TO PHQ QUESTIONS 1-9: 0
9. THOUGHTS THAT YOU WOULD BE BETTER OFF DEAD, OR OF HURTING YOURSELF: 0
8. MOVING OR SPEAKING SO SLOWLY THAT OTHER PEOPLE COULD HAVE NOTICED. OR THE OPPOSITE, BEING SO FIGETY OR RESTLESS THAT YOU HAVE BEEN MOVING AROUND A LOT MORE THAN USUAL: 0
SUM OF ALL RESPONSES TO PHQ9 QUESTIONS 1 & 2: 0
SUM OF ALL RESPONSES TO PHQ QUESTIONS 1-9: 0
SUM OF ALL RESPONSES TO PHQ QUESTIONS 1-9: 0
5. POOR APPETITE OR OVEREATING: 0
1. LITTLE INTEREST OR PLEASURE IN DOING THINGS: 0
4. FEELING TIRED OR HAVING LITTLE ENERGY: 0
2. FEELING DOWN, DEPRESSED OR HOPELESS: 0
3. TROUBLE FALLING OR STAYING ASLEEP: 0
6. FEELING BAD ABOUT YOURSELF - OR THAT YOU ARE A FAILURE OR HAVE LET YOURSELF OR YOUR FAMILY DOWN: 0
7. TROUBLE CONCENTRATING ON THINGS, SUCH AS READING THE NEWSPAPER OR WATCHING TELEVISION: 0
10. IF YOU CHECKED OFF ANY PROBLEMS, HOW DIFFICULT HAVE THESE PROBLEMS MADE IT FOR YOU TO DO YOUR WORK, TAKE CARE OF THINGS AT HOME, OR GET ALONG WITH OTHER PEOPLE: 0

## 2024-01-11 NOTE — PROGRESS NOTES
Ramona Ragland (:  1965) is a 58 y.o. female, here for evaluation of the following chief complaint(s):    Diabetes      ASSESSMENT/PLAN:  1. Hyperlipidemia, unspecified hyperlipidemia type  Check labs on atorvastatin  -     Lipid Panel; Future  -     Comprehensive Metabolic Panel; Future  2. Essential hypertension, benign  Well-controlled on amlodipine, atenolol, spironolactone  3. Type 2 diabetes mellitus without complication, without long-term current use of insulin ([facility])  Check labs on metformin and Rybelsus  -     Hemoglobin A1C; Future  -       4. Stage 3 chronic kidney disease, unspecified whether stage 3a or 3b CKD ([facility])   Not taking ACE or ARB as per nephrology. Gfr improved.  5. Chronic left shoulder pain  -     Sedimentation Rate; Future  6. Obesity, Class III, BMI 40-49.9 (morbid obesity) ([facility])  Weight down 15 lbs since august  Return in about 6 months (around 2024).    SUBJECTIVE/OBJECTIVE:  HPI  Patient is here to follow-up.  She continues to have shoulder pain and weakness but feels her range of motion has improved since last time.    Her weight is noted to be down from 260 to 245 pounds.  She does note some lower abdominal discomfort and diarrhea on Rybelsus.    Review of Systems    Past Medical History:   Diagnosis Date    Allergic rhinitis     Alopecia     CKD (chronic kidney disease) stage 3, GFR 30-59 ml/min ([facility])     COVID-19 2020    Diabetes mellitus type 2, uncontrolled, with complications 2014    TYPE 2    Fibroids, intramural 2011    History of uterine fibroid     Hyperaldosteronism (HCC)     ?    Hyperlipidemia     ZA (obstructive sleep apnea) 2011    [facility] sleep eval     Osteoarthritis of right knee     secondary hypertension     Vitamin D deficiency 2012       Current Outpatient Medications   Medication Sig Dispense Refill    empagliflozin (JARDIANCE) 10 MG tablet Take 1 tablet by mouth daily 30 tablet 5    metFORMIN (GLUCOPHAGE-XR) 500 MG

## 2024-01-12 LAB
ALBUMIN SERPL-MCNC: 4.5 G/DL (ref 3.4–5)
ALBUMIN/GLOB SERPL: 1.4 {RATIO} (ref 1.1–2.2)
ALP SERPL-CCNC: 75 U/L (ref 40–129)
ALT SERPL-CCNC: 14 U/L (ref 10–40)
ANION GAP SERPL CALCULATED.3IONS-SCNC: 9 MMOL/L (ref 3–16)
AST SERPL-CCNC: 13 U/L (ref 15–37)
BILIRUB SERPL-MCNC: <0.2 MG/DL (ref 0–1)
BUN SERPL-MCNC: 24 MG/DL (ref 7–20)
CALCIUM SERPL-MCNC: 9.4 MG/DL (ref 8.3–10.6)
CHLORIDE SERPL-SCNC: 103 MMOL/L (ref 99–110)
CHOLEST SERPL-MCNC: 138 MG/DL (ref 0–199)
CO2 SERPL-SCNC: 29 MMOL/L (ref 21–32)
CREAT SERPL-MCNC: 1.1 MG/DL (ref 0.6–1.1)
EST. AVERAGE GLUCOSE BLD GHB EST-MCNC: 165.7 MG/DL
GFR SERPLBLD CREATININE-BSD FMLA CKD-EPI: 59 ML/MIN/{1.73_M2}
GLUCOSE SERPL-MCNC: 143 MG/DL (ref 70–99)
HBA1C MFR BLD: 7.4 %
HDLC SERPL-MCNC: 40 MG/DL (ref 40–60)
LDLC SERPL CALC-MCNC: 74 MG/DL
POTASSIUM SERPL-SCNC: 4.9 MMOL/L (ref 3.5–5.1)
PROT SERPL-MCNC: 7.8 G/DL (ref 6.4–8.2)
SODIUM SERPL-SCNC: 141 MMOL/L (ref 136–145)
TRIGL SERPL-MCNC: 120 MG/DL (ref 0–150)
VLDLC SERPL CALC-MCNC: 24 MG/DL

## 2024-01-14 DIAGNOSIS — I10 ESSENTIAL HYPERTENSION, BENIGN: ICD-10-CM

## 2024-01-15 RX ORDER — ATENOLOL 50 MG/1
TABLET ORAL
Qty: 15 TABLET | Refills: 5 | Status: SHIPPED | OUTPATIENT
Start: 2024-01-15

## 2024-01-15 NOTE — TELEPHONE ENCOUNTER
Medication:   Requested Prescriptions     Pending Prescriptions Disp Refills    atenolol (TENORMIN) 50 MG tablet [Pharmacy Med Name: ATENOLOL 50 MG TABLET] 15 tablet 5     Sig: TAKE 1/2 TABLET BY MOUTH DAILY     Last Filled:  6/29/23    Last appt: 1/11/2024   Next appt: 7/11/2024    Last OARRS:        No data to display

## 2024-01-23 ENCOUNTER — OFFICE VISIT (OUTPATIENT)
Dept: GYNECOLOGY | Age: 59
End: 2024-01-23
Payer: COMMERCIAL

## 2024-01-23 VITALS
HEIGHT: 62 IN | HEART RATE: 65 BPM | SYSTOLIC BLOOD PRESSURE: 114 MMHG | WEIGHT: 252.4 LBS | OXYGEN SATURATION: 97 % | BODY MASS INDEX: 46.45 KG/M2 | DIASTOLIC BLOOD PRESSURE: 68 MMHG | RESPIRATION RATE: 17 BRPM

## 2024-01-23 DIAGNOSIS — Z78.0 MENOPAUSE: ICD-10-CM

## 2024-01-23 DIAGNOSIS — Z01.419 WELL WOMAN EXAM WITH ROUTINE GYNECOLOGICAL EXAM: Primary | ICD-10-CM

## 2024-01-23 PROCEDURE — 99396 PREV VISIT EST AGE 40-64: CPT | Performed by: OBSTETRICS & GYNECOLOGY

## 2024-01-23 PROCEDURE — 3078F DIAST BP <80 MM HG: CPT | Performed by: OBSTETRICS & GYNECOLOGY

## 2024-01-23 PROCEDURE — 3074F SYST BP LT 130 MM HG: CPT | Performed by: OBSTETRICS & GYNECOLOGY

## 2024-01-23 ASSESSMENT — ENCOUNTER SYMPTOMS
ALLERGIC/IMMUNOLOGIC NEGATIVE: 1
GASTROINTESTINAL NEGATIVE: 1
EYES NEGATIVE: 1
RESPIRATORY NEGATIVE: 1

## 2024-01-24 NOTE — PROGRESS NOTES
Subjective:      Patient ID: Ramona Ragland is a 58 y.o. female.    Patient is here for annual. Patient in menopause-stable.    Gynecologic Exam        Review of Systems   Constitutional: Negative.    HENT: Negative.     Eyes: Negative.    Respiratory: Negative.     Cardiovascular: Negative.    Gastrointestinal: Negative.    Endocrine: Negative.    Genitourinary: Negative.    Musculoskeletal: Negative.    Skin: Negative.    Allergic/Immunologic: Negative.    Neurological: Negative.    Hematological: Negative.    Psychiatric/Behavioral: Negative.       Date of Birth 1965  Past Medical History:   Diagnosis Date    Allergic rhinitis     Alopecia     CKD (chronic kidney disease) stage 3, GFR 30-59 ml/min (HCC)     COVID-19 2020    Diabetes mellitus type 2, uncontrolled, with complications 2014    TYPE 2    Fibroids, intramural 2011    History of uterine fibroid     Hyperaldosteronism (HCC)     ?    Hyperlipidemia     ZA (obstructive sleep apnea) 2011    [facility] sleep eval     Osteoarthritis of right knee     secondary hypertension     Vitamin D deficiency 2012     Past Surgical History:   Procedure Laterality Date     SECTION      x3    COLONOSCOPY      fu 5 years    COLONOSCOPY N/A 2022    fu 5 yrs, COLONOSCOPY POLYPECTOMY SNARE/COLD BIOPSY performed by Tristan Nuno MD at [facility] ENDOSCOPY    TUBAL LIGATION  1990    BTL     OB History    Para Term  AB Living   3 3 3     3   SAB IAB Ectopic Molar Multiple Live Births             3      # Outcome Date GA Lbr Alexey/2nd Weight Sex Delivery Anes PTL Lv   3 Term  40w0d   M CS-Unspec   ROSELIA   2 Term  40w0d   F CS-Unspec   ROSELIA   1 Term 1986 40w0d   M CS-Unspec   ROSELIA     Social History     Socioeconomic History    Marital status:      Spouse name: Jame    Number of children: 3    Years of education: Not on file    Highest education level: Not on file   Occupational History    Occupation:

## 2024-03-14 NOTE — TELEPHONE ENCOUNTER
Patient does have an eye dr at [facility]  Advised her to contact them Splint Application Procedure Note:    Splint Application: A  walking boot was applied to left foot.  Post Application Evaluation:  The extremity and splint were assessed, CMS intact,  the patient was comfortable and the affected joint/extremity was adequately stabilized and protected.  The patient tolerated the splint application procedure well, without difficulty.    Patient Instructions:  Precautionary instructions were provided regarding potential vascular status changes post splint application.  Instructions were also provided regarding the use and duration of splinting along with the care of the  splint.

## 2024-03-26 DIAGNOSIS — I10 ESSENTIAL HYPERTENSION, BENIGN: ICD-10-CM

## 2024-03-26 RX ORDER — SPIRONOLACTONE 100 MG/1
TABLET, FILM COATED ORAL
Qty: 30 TABLET | Refills: 5 | Status: SHIPPED | OUTPATIENT
Start: 2024-03-26

## 2024-03-26 RX ORDER — METFORMIN HYDROCHLORIDE 500 MG/1
TABLET, EXTENDED RELEASE ORAL
Qty: 120 TABLET | Refills: 3 | Status: SHIPPED | OUTPATIENT
Start: 2024-03-26

## 2024-03-26 NOTE — TELEPHONE ENCOUNTER
Last appointment: 1/11/2024  Next appointment: 7/11/2024  Last refill:   Metformin: 11/17/2023  Spironolactone: 09/11/2023

## 2024-04-29 DIAGNOSIS — I10 ESSENTIAL HYPERTENSION, BENIGN: ICD-10-CM

## 2024-04-29 DIAGNOSIS — E78.5 HYPERLIPIDEMIA, UNSPECIFIED HYPERLIPIDEMIA TYPE: ICD-10-CM

## 2024-04-29 RX ORDER — AMLODIPINE BESYLATE 5 MG/1
TABLET ORAL
Qty: 30 TABLET | Refills: 5 | Status: SHIPPED | OUTPATIENT
Start: 2024-04-29

## 2024-04-29 RX ORDER — ATORVASTATIN CALCIUM 40 MG/1
TABLET, FILM COATED ORAL
Qty: 30 TABLET | Refills: 5 | Status: SHIPPED | OUTPATIENT
Start: 2024-04-29

## 2024-04-29 NOTE — TELEPHONE ENCOUNTER
Last appointment: 1/11/2024  Next appointment: 7/11/2024  Last refill: amlodipine 3/26/24, atorvastatin 1/17/24

## 2024-05-06 ENCOUNTER — OFFICE VISIT (OUTPATIENT)
Dept: FAMILY MEDICINE CLINIC | Age: 59
End: 2024-05-06
Payer: COMMERCIAL

## 2024-05-06 VITALS
WEIGHT: 239 LBS | TEMPERATURE: 98.5 F | SYSTOLIC BLOOD PRESSURE: 138 MMHG | BODY MASS INDEX: 43.98 KG/M2 | OXYGEN SATURATION: 95 % | HEART RATE: 80 BPM | DIASTOLIC BLOOD PRESSURE: 88 MMHG | HEIGHT: 62 IN

## 2024-05-06 DIAGNOSIS — J01.90 ACUTE BACTERIAL SINUSITIS: ICD-10-CM

## 2024-05-06 DIAGNOSIS — B37.31 VAGINAL YEAST INFECTION: ICD-10-CM

## 2024-05-06 DIAGNOSIS — E11.29 TYPE 2 DIABETES MELLITUS WITH MICROALBUMINURIA (HCC): Primary | ICD-10-CM

## 2024-05-06 DIAGNOSIS — R80.9 TYPE 2 DIABETES MELLITUS WITH MICROALBUMINURIA (HCC): Primary | ICD-10-CM

## 2024-05-06 DIAGNOSIS — B96.89 ACUTE BACTERIAL SINUSITIS: ICD-10-CM

## 2024-05-06 PROCEDURE — 3051F HG A1C>EQUAL 7.0%<8.0%: CPT | Performed by: NURSE PRACTITIONER

## 2024-05-06 PROCEDURE — 3075F SYST BP GE 130 - 139MM HG: CPT | Performed by: NURSE PRACTITIONER

## 2024-05-06 PROCEDURE — 3079F DIAST BP 80-89 MM HG: CPT | Performed by: NURSE PRACTITIONER

## 2024-05-06 PROCEDURE — 99214 OFFICE O/P EST MOD 30 MIN: CPT | Performed by: NURSE PRACTITIONER

## 2024-05-06 RX ORDER — FLUCONAZOLE 150 MG/1
150 TABLET ORAL
Qty: 2 TABLET | Refills: 0 | Status: SHIPPED | OUTPATIENT
Start: 2024-05-06 | End: 2024-05-12

## 2024-05-06 RX ORDER — AMOXICILLIN AND CLAVULANATE POTASSIUM 875; 125 MG/1; MG/1
1 TABLET, FILM COATED ORAL 2 TIMES DAILY
Qty: 14 TABLET | Refills: 0 | Status: SHIPPED | OUTPATIENT
Start: 2024-05-06 | End: 2024-05-13

## 2024-05-06 SDOH — ECONOMIC STABILITY: INCOME INSECURITY: HOW HARD IS IT FOR YOU TO PAY FOR THE VERY BASICS LIKE FOOD, HOUSING, MEDICAL CARE, AND HEATING?: NOT HARD AT ALL

## 2024-05-06 SDOH — ECONOMIC STABILITY: FOOD INSECURITY: WITHIN THE PAST 12 MONTHS, YOU WORRIED THAT YOUR FOOD WOULD RUN OUT BEFORE YOU GOT MONEY TO BUY MORE.: NEVER TRUE

## 2024-05-06 SDOH — ECONOMIC STABILITY: FOOD INSECURITY: WITHIN THE PAST 12 MONTHS, THE FOOD YOU BOUGHT JUST DIDN'T LAST AND YOU DIDN'T HAVE MONEY TO GET MORE.: NEVER TRUE

## 2024-05-06 ASSESSMENT — ENCOUNTER SYMPTOMS
CONSTIPATION: 0
VOMITING: 0
COUGH: 1
DIARRHEA: 0
BLOOD IN STOOL: 0
SINUS PRESSURE: 0
BACK PAIN: 0
EYE REDNESS: 0
RHINORRHEA: 0
COLOR CHANGE: 0
WHEEZING: 0
SHORTNESS OF BREATH: 0
ABDOMINAL PAIN: 0
NAUSEA: 0
EYE ITCHING: 0
CHEST TIGHTNESS: 0
SORE THROAT: 0

## 2024-05-06 NOTE — ASSESSMENT & PLAN NOTE
Vaginal yeast infection likely a result of SGLT2 inhibitor and increased glycemic levels.  Patient will be provided with 150 mg of Diflucan every 72 hours for total of 2 doses.  Patient is anticipated to gain benefit at this dose with minimal side effects.Call or return to clinic prn if these symptoms worsen or fail to improve as anticipated.

## 2024-05-06 NOTE — PROGRESS NOTES
Ramona Ragland (:  1965) is a 58 y.o. female,Established patient, here for evaluation of the following chief complaint(s):  Hyperglycemia (Higher readings for ~1 month), Congestion (Productive cough that is white, ear fullness/ringing), and Vaginitis (Has been using monistat which hasn't helped)      ASSESSMENT/PLAN:  1. Type 2 diabetes mellitus with microalbuminuria (HCC)  Assessment & Plan:   Previous labs reviewed that shows the most recent A1c is 7.4.  At this time will not make any changes in her diabetes medications and have her continue Jardiance 10 mg daily and 500 mg metformin extended release 2000 mg daily.  Advised patient that elevated glucose levels in combination with SGLT2 inhibitors is likely the cause for vaginal yeast infection.  Better control of glycemic levels will improve this symptom.  At this time this is a chronic uncontrolled unstable problem.  She will continue to follow-up with her primary care provider for her ongoing chronic medical conditions.  2. Vaginal yeast infection  Assessment & Plan:   Vaginal yeast infection likely a result of SGLT2 inhibitor and increased glycemic levels.  Patient will be provided with 150 mg of Diflucan every 72 hours for total of 2 doses.  Patient is anticipated to gain benefit at this dose with minimal side effects.Call or return to clinic prn if these symptoms worsen or fail to improve as anticipated.    3. Acute bacterial sinusitis  Assessment & Plan:   Continue with symptomatic management. Recommend increased water intake as well as saline irrigation, mucolytics, and antihistamines as needed.  Patient will be started on Augmentin 875/125 mg 2 times daily for 1 week.  Advised to call back if no improvement over the next 4-7 days.         No follow-ups on file.    SUBJECTIVE/OBJECTIVE:    Patient in the office as an overflow as her current primary care provider is unable to see her in the office today.  She presents today with hyperglycemia,

## 2024-05-06 NOTE — ASSESSMENT & PLAN NOTE
Previous labs reviewed that shows the most recent A1c is 7.4.  At this time will not make any changes in her diabetes medications and have her continue Jardiance 10 mg daily and 500 mg metformin extended release 2000 mg daily.  Advised patient that elevated glucose levels in combination with SGLT2 inhibitors is likely the cause for vaginal yeast infection.  Better control of glycemic levels will improve this symptom.  At this time this is a chronic uncontrolled unstable problem.  She will continue to follow-up with her primary care provider for her ongoing chronic medical conditions.

## 2024-05-06 NOTE — ASSESSMENT & PLAN NOTE
Continue with symptomatic management. Recommend increased water intake as well as saline irrigation, mucolytics, and antihistamines as needed.  Patient will be started on Augmentin 875/125 mg 2 times daily for 1 week.  Advised to call back if no improvement over the next 4-7 days.

## 2024-05-31 DIAGNOSIS — N18.30 STAGE 3 CHRONIC KIDNEY DISEASE, UNSPECIFIED WHETHER STAGE 3A OR 3B CKD (HCC): ICD-10-CM

## 2024-05-31 LAB
ALBUMIN SERPL-MCNC: 4.3 G/DL (ref 3.4–5)
ANION GAP SERPL CALCULATED.3IONS-SCNC: 12 MMOL/L (ref 3–16)
BUN SERPL-MCNC: 26 MG/DL (ref 7–20)
CALCIUM SERPL-MCNC: 10.5 MG/DL (ref 8.3–10.6)
CHLORIDE SERPL-SCNC: 97 MMOL/L (ref 99–110)
CO2 SERPL-SCNC: 25 MMOL/L (ref 21–32)
CREAT SERPL-MCNC: 1.2 MG/DL (ref 0.6–1.1)
CREAT UR-MCNC: 29.9 MG/DL (ref 28–259)
GFR SERPLBLD CREATININE-BSD FMLA CKD-EPI: 53 ML/MIN/{1.73_M2}
GLUCOSE SERPL-MCNC: 449 MG/DL (ref 70–99)
MICROALBUMIN UR DL<=1MG/L-MCNC: 2.7 MG/DL
MICROALBUMIN/CREAT UR: 90.3 MG/G (ref 0–30)
PHOSPHATE SERPL-MCNC: 5.4 MG/DL (ref 2.5–4.9)
POTASSIUM SERPL-SCNC: 5.2 MMOL/L (ref 3.5–5.1)
SODIUM SERPL-SCNC: 134 MMOL/L (ref 136–145)

## 2024-06-07 ENCOUNTER — OFFICE VISIT (OUTPATIENT)
Dept: INTERNAL MEDICINE CLINIC | Age: 59
End: 2024-06-07

## 2024-06-07 VITALS
DIASTOLIC BLOOD PRESSURE: 76 MMHG | BODY MASS INDEX: 42.65 KG/M2 | OXYGEN SATURATION: 96 % | SYSTOLIC BLOOD PRESSURE: 120 MMHG | WEIGHT: 233.2 LBS | HEART RATE: 65 BPM

## 2024-06-07 DIAGNOSIS — L30.9 DERMATITIS: ICD-10-CM

## 2024-06-07 DIAGNOSIS — R35.0 URINARY FREQUENCY: ICD-10-CM

## 2024-06-07 DIAGNOSIS — E08.65 DIABETES MELLITUS DUE TO UNDERLYING CONDITION, UNCONTROLLED, WITH HYPERGLYCEMIA (HCC): Primary | ICD-10-CM

## 2024-06-07 DIAGNOSIS — N76.1 SUBACUTE VAGINITIS: ICD-10-CM

## 2024-06-07 DIAGNOSIS — R10.13 EPIGASTRIC PAIN: ICD-10-CM

## 2024-06-07 DIAGNOSIS — E08.65 DIABETES MELLITUS DUE TO UNDERLYING CONDITION, UNCONTROLLED, WITH HYPERGLYCEMIA (HCC): ICD-10-CM

## 2024-06-07 LAB — HBA1C MFR BLD: 14 %

## 2024-06-07 RX ORDER — ORAL SEMAGLUTIDE 3 MG/1
3 TABLET ORAL DAILY
Qty: 30 TABLET | Refills: 0 | Status: SHIPPED | COMMUNITY
Start: 2024-06-07 | End: 2024-07-07

## 2024-06-07 RX ORDER — INSULIN GLARGINE 100 [IU]/ML
10 INJECTION, SOLUTION SUBCUTANEOUS NIGHTLY
Qty: 5 ADJUSTABLE DOSE PRE-FILLED PEN SYRINGE | Refills: 3 | Status: SHIPPED | OUTPATIENT
Start: 2024-06-07 | End: 2024-07-07

## 2024-06-07 NOTE — PROGRESS NOTES
Ramona Ragland (:  1965) is a 58 y.o. female, here for evaluation of the following chief complaint(s):    Rash (Under left breast, possible yeast infection again, Sugars are not going down.)      ASSESSMENT/PLAN:  1. Diabetes mellitus due to underlying condition, uncontrolled, with hyperglycemia (HCC)  Very uncontrolled glucose without apparent causes patient has been trying to eat right and exercise.  She reports compliance with her medications.  Have concern for serious underlying etiology.  Will check related labs.  Advised patient to start glargine insulin 10 units before bed.  Asked her to contact me every few days with morning glucose readings and may increase the dose.  If feeling unwell, go to ER.  Start Rybelsus.  Continue Jardiance and metformin.  -     POCT glycosylated hemoglobin (Hb A1C)-over 14  -     Comprehensive Metabolic Panel; Future  -     CBC with Auto Differential; Future  -     CANCER ANTIGEN 19-9; Future  -     insulin glargine (LANTUS SOLOSTAR) 100 UNIT/ML injection pen; Inject 10 Units into the skin nightly, Disp-5 Adjustable Dose Pre-filled Pen Syringe, R-3Normal  -     Insulin Pen Needle 32G X 4 MM MISC; DAILY Starting 2024, Until Sun 9/15/2024, For 100 days, Disp-100 each, R-3, Normal  -     Semaglutide (RYBELSUS) 3 MG TABS; Take 3 mg by mouth daily, Disp-30 tablet, R-0Sample  -     Vitamin B12; Future  2. Urinary frequency  -     Culture, Urine  -     Urinalysis  -     Microscopic Urinalysis  3. Subacute vaginitis  -     Vaginal Pathogens Probes *A  4. Epigastric pain  -     US ABDOMEN LIMITED; Future  5. Dermatitis  Probable bug bites, possibly bedbugs with recent hotel stay.  Advised topical hydrocortisone    Keep 24 visit      SUBJECTIVE/OBJECTIVE:  HPI  Patient is here with concerns for very elevated glucose readings, sometimes over 500.  These readings occur even when she is fasting only eating vegetables.  She has lost significant weight.  She has been

## 2024-06-07 NOTE — PATIENT INSTRUCTIONS
Topical hydrocortisone cream 1 percent in the itchy area for likely bug bites and may also apply some topical clotrimazole cream, both otc    Start insulin - 10 units in the evening  Call with readings every few days to increase dose  Restart rybelsus samples  Stay on metformin and jardiance    Labs

## 2024-06-08 LAB
ALBUMIN SERPL-MCNC: 4.5 G/DL (ref 3.4–5)
ALBUMIN/GLOB SERPL: 1.2 {RATIO} (ref 1.1–2.2)
ALP SERPL-CCNC: 83 U/L (ref 40–129)
ALT SERPL-CCNC: 19 U/L (ref 10–40)
ANION GAP SERPL CALCULATED.3IONS-SCNC: 15 MMOL/L (ref 3–16)
AST SERPL-CCNC: 16 U/L (ref 15–37)
BACTERIA URNS QL MICRO: NORMAL /HPF
BASOPHILS # BLD: 0.1 K/UL (ref 0–0.2)
BASOPHILS NFR BLD: 1.1 %
BILIRUB SERPL-MCNC: 0.3 MG/DL (ref 0–1)
BILIRUB UR QL STRIP.AUTO: NEGATIVE
BUN SERPL-MCNC: 25 MG/DL (ref 7–20)
CALCIUM SERPL-MCNC: 9.9 MG/DL (ref 8.3–10.6)
CANDIDA DNA VAG QL NAA+PROBE: ABNORMAL
CHLORIDE SERPL-SCNC: 97 MMOL/L (ref 99–110)
CLARITY UR: CLEAR
CO2 SERPL-SCNC: 22 MMOL/L (ref 21–32)
COLOR UR: YELLOW
CREAT SERPL-MCNC: 1.1 MG/DL (ref 0.6–1.1)
DEPRECATED RDW RBC AUTO: 14.9 % (ref 12.4–15.4)
EOSINOPHIL # BLD: 0.1 K/UL (ref 0–0.6)
EOSINOPHIL NFR BLD: 1.1 %
EPI CELLS #/AREA URNS AUTO: 2 /HPF (ref 0–5)
G VAGINALIS DNA SPEC QL NAA+PROBE: ABNORMAL
GFR SERPLBLD CREATININE-BSD FMLA CKD-EPI: 58 ML/MIN/{1.73_M2}
GLUCOSE SERPL-MCNC: 208 MG/DL (ref 70–99)
GLUCOSE UR STRIP.AUTO-MCNC: >=1000 MG/DL
HCT VFR BLD AUTO: 38.2 % (ref 36–48)
HGB UR QL STRIP.AUTO: NEGATIVE
HYALINE CASTS #/AREA URNS AUTO: 0 /LPF (ref 0–8)
KETONES UR STRIP.AUTO-MCNC: NEGATIVE MG/DL
LEUKOCYTE ESTERASE UR QL STRIP.AUTO: NEGATIVE
LYMPHOCYTES # BLD: 3 K/UL (ref 1–5.1)
LYMPHOCYTES NFR BLD: 35.9 %
MCH RBC QN AUTO: 28.1 PG (ref 26–34)
MCHC RBC AUTO-ENTMCNC: 34.2 G/DL (ref 31–36)
MCV RBC AUTO: 82.1 FL (ref 80–100)
MONOCYTES # BLD: 0.4 K/UL (ref 0–1.3)
MONOCYTES NFR BLD: 4.8 %
NEUTROPHILS # BLD: 4.8 K/UL (ref 1.7–7.7)
NEUTROPHILS NFR BLD: 57.1 %
NITRITE UR QL STRIP.AUTO: NEGATIVE
PH UR STRIP.AUTO: 6 [PH] (ref 5–8)
PLATELET # BLD AUTO: 268 K/UL (ref 135–450)
PMV BLD AUTO: 10 FL (ref 5–10.5)
POTASSIUM SERPL-SCNC: 4.2 MMOL/L (ref 3.5–5.1)
PROT SERPL-MCNC: 8.3 G/DL (ref 6.4–8.2)
RBC # BLD AUTO: 4.66 M/UL (ref 4–5.2)
RBC CLUMPS #/AREA URNS AUTO: 0 /HPF (ref 0–4)
SODIUM SERPL-SCNC: 134 MMOL/L (ref 136–145)
SP GR UR STRIP.AUTO: 1.03 (ref 1–1.03)
T VAGINALIS DNA VAG QL NAA+PROBE: ABNORMAL
UA DIPSTICK W REFLEX MICRO PNL UR: YES
URN SPEC COLLECT METH UR: ABNORMAL
UROBILINOGEN UR STRIP-ACNC: 0.2 E.U./DL
VIT B12 SERPL-MCNC: 1015 PG/ML (ref 211–911)
WBC # BLD AUTO: 8.4 K/UL (ref 4–11)
WBC #/AREA URNS AUTO: 1 /HPF (ref 0–5)

## 2024-06-08 RX ORDER — METRONIDAZOLE 500 MG/1
500 TABLET ORAL 2 TIMES DAILY
Qty: 14 TABLET | Refills: 0 | Status: SHIPPED | OUTPATIENT
Start: 2024-06-08 | End: 2024-06-15

## 2024-06-08 RX ORDER — FLUCONAZOLE 150 MG/1
150 TABLET ORAL ONCE
Qty: 2 TABLET | Refills: 0 | Status: SHIPPED | OUTPATIENT
Start: 2024-06-08 | End: 2024-06-08

## 2024-06-09 LAB — BACTERIA UR CULT: NORMAL

## 2024-06-10 LAB — BACTERIA UR CULT: NORMAL

## 2024-06-12 ENCOUNTER — HOSPITAL ENCOUNTER (OUTPATIENT)
Dept: ULTRASOUND IMAGING | Age: 59
Discharge: HOME | End: 2024-06-12
Payer: COMMERCIAL

## 2024-06-12 DIAGNOSIS — R10.13 EPIGASTRIC PAIN: ICD-10-CM

## 2024-06-12 LAB — CANCER AG19-9 SERPL IA-ACNC: 27 U/ML (ref 0–35)

## 2024-06-12 PROCEDURE — 76705 ECHO EXAM OF ABDOMEN: CPT

## 2024-06-14 DIAGNOSIS — E08.65 DIABETES MELLITUS DUE TO UNDERLYING CONDITION, UNCONTROLLED, WITH HYPERGLYCEMIA (HCC): ICD-10-CM

## 2024-06-14 DIAGNOSIS — K80.20 GALLSTONES: Primary | ICD-10-CM

## 2024-06-14 RX ORDER — INSULIN GLARGINE 100 [IU]/ML
20 INJECTION, SOLUTION SUBCUTANEOUS NIGHTLY
Qty: 5 ADJUSTABLE DOSE PRE-FILLED PEN SYRINGE | Refills: 3
Start: 2024-06-14 | End: 2024-06-20 | Stop reason: DRUGHIGH

## 2024-06-19 ENCOUNTER — TELEPHONE (OUTPATIENT)
Dept: INTERNAL MEDICINE CLINIC | Age: 59
End: 2024-06-19

## 2024-06-19 DIAGNOSIS — E08.65 DIABETES MELLITUS DUE TO UNDERLYING CONDITION, UNCONTROLLED, WITH HYPERGLYCEMIA (HCC): ICD-10-CM

## 2024-06-20 RX ORDER — INSULIN GLARGINE 100 [IU]/ML
30 INJECTION, SOLUTION SUBCUTANEOUS NIGHTLY
Qty: 5 ADJUSTABLE DOSE PRE-FILLED PEN SYRINGE | Refills: 3 | Status: SHIPPED
Start: 2024-06-20 | End: 2024-07-20

## 2024-06-20 NOTE — TELEPHONE ENCOUNTER
Please advise pt I received the message she sent to \"customer service\" about her high glucose. Two things:    1.Is she taking 20 units of Glargine insulin? If yes, increase to 30. If not, let me know.  2. I want her to see a nutritionist so she can make healthier food choices. Is she willing? Let me know.

## 2024-06-20 NOTE — TELEPHONE ENCOUNTER
Called and spoke with patient,   Verifies current glargine dose is 20 units,   Let her know per provider to increase this to 30 units. Voices understanding and will begin that with her next dose tonight     Pt is agreeable to seeing nutritionist.   Would like to see someone close to her home.    [address]  [facility] [address]    I have not yet set up the referral    Please advise referral preference

## 2024-06-24 ENCOUNTER — OFFICE VISIT (OUTPATIENT)
Dept: SURGERY | Age: 59
End: 2024-06-24
Payer: COMMERCIAL

## 2024-06-24 VITALS
BODY MASS INDEX: 43.43 KG/M2 | WEIGHT: 236 LBS | RESPIRATION RATE: 16 BRPM | DIASTOLIC BLOOD PRESSURE: 80 MMHG | HEIGHT: 62 IN | TEMPERATURE: 97.7 F | OXYGEN SATURATION: 97 % | SYSTOLIC BLOOD PRESSURE: 115 MMHG | HEART RATE: 61 BPM

## 2024-06-24 DIAGNOSIS — K80.20 SYMPTOMATIC CHOLELITHIASIS: Primary | ICD-10-CM

## 2024-06-24 PROCEDURE — 99204 OFFICE O/P NEW MOD 45 MIN: CPT | Performed by: SURGERY

## 2024-06-24 PROCEDURE — 3079F DIAST BP 80-89 MM HG: CPT | Performed by: SURGERY

## 2024-06-24 PROCEDURE — 3074F SYST BP LT 130 MM HG: CPT | Performed by: SURGERY

## 2024-06-26 ENCOUNTER — PREP FOR PROCEDURE (OUTPATIENT)
Dept: SURGERY | Age: 59
End: 2024-06-26

## 2024-06-26 ENCOUNTER — TELEPHONE (OUTPATIENT)
Dept: SURGERY | Age: 59
End: 2024-06-26

## 2024-06-26 DIAGNOSIS — K80.20 SYMPTOMATIC CHOLELITHIASIS: ICD-10-CM

## 2024-06-26 RX ORDER — SODIUM CHLORIDE 0.9 % (FLUSH) 0.9 %
5-40 SYRINGE (ML) INJECTION PRN
Status: CANCELLED | OUTPATIENT
Start: 2024-07-11

## 2024-06-26 RX ORDER — SODIUM CHLORIDE 0.9 % (FLUSH) 0.9 %
5-40 SYRINGE (ML) INJECTION EVERY 12 HOURS SCHEDULED
Status: CANCELLED | OUTPATIENT
Start: 2024-07-11

## 2024-06-26 RX ORDER — SODIUM CHLORIDE 9 MG/ML
INJECTION, SOLUTION INTRAVENOUS PRN
Status: CANCELLED | OUTPATIENT
Start: 2024-07-11

## 2024-06-26 NOTE — TELEPHONE ENCOUNTER
Patient seen on 6/24/24 surgery letter received Laparoscopic Cholecystectomy with Cholangiogram 1 hr OR time needed under general     Pre op instructions reviewed including the need for a H&P with a EKG  Pre op packet emailed to [email or web address]    Post op appt scheduled     Case request sent     Prep for proc completed     Placed on calendar and outlook

## 2024-07-02 ENCOUNTER — OFFICE VISIT (OUTPATIENT)
Dept: INTERNAL MEDICINE CLINIC | Age: 59
End: 2024-07-02
Payer: COMMERCIAL

## 2024-07-02 VITALS
SYSTOLIC BLOOD PRESSURE: 128 MMHG | BODY MASS INDEX: 43.16 KG/M2 | HEART RATE: 82 BPM | WEIGHT: 236 LBS | DIASTOLIC BLOOD PRESSURE: 82 MMHG | OXYGEN SATURATION: 96 %

## 2024-07-02 DIAGNOSIS — E08.65 DIABETES MELLITUS DUE TO UNDERLYING CONDITION, UNCONTROLLED, WITH HYPERGLYCEMIA (HCC): ICD-10-CM

## 2024-07-02 DIAGNOSIS — Z01.818 PREOP EXAMINATION: Primary | ICD-10-CM

## 2024-07-02 LAB — HBA1C MFR BLD: 12.8 %

## 2024-07-02 PROCEDURE — 93000 ELECTROCARDIOGRAM COMPLETE: CPT | Performed by: INTERNAL MEDICINE

## 2024-07-02 PROCEDURE — 3074F SYST BP LT 130 MM HG: CPT | Performed by: INTERNAL MEDICINE

## 2024-07-02 PROCEDURE — 3079F DIAST BP 80-89 MM HG: CPT | Performed by: INTERNAL MEDICINE

## 2024-07-02 PROCEDURE — 83036 HEMOGLOBIN GLYCOSYLATED A1C: CPT | Performed by: INTERNAL MEDICINE

## 2024-07-02 PROCEDURE — 99214 OFFICE O/P EST MOD 30 MIN: CPT | Performed by: INTERNAL MEDICINE

## 2024-07-02 RX ORDER — INSULIN GLARGINE 100 [IU]/ML
40 INJECTION, SOLUTION SUBCUTANEOUS NIGHTLY
Qty: 5 ADJUSTABLE DOSE PRE-FILLED PEN SYRINGE | Refills: 3 | Status: SHIPPED
Start: 2024-07-02 | End: 2024-08-01

## 2024-07-02 ASSESSMENT — ENCOUNTER SYMPTOMS
RHINORRHEA: 0
COUGH: 0
TROUBLE SWALLOWING: 0
NAUSEA: 0
SORE THROAT: 0
DIARRHEA: 0
ABDOMINAL PAIN: 0
SHORTNESS OF BREATH: 0
BACK PAIN: 1

## 2024-07-02 NOTE — PATIENT INSTRUCTIONS
Skip Rybelus, aspirin, nsaids, vitamins and supplements for 7 days before procedure  Take 30 units Lantus night before procedure    May skip Metformin morning of surgery.  May take bp pills with small sip of water morning of surgery unless surgeon says not to.    --  Generally, Increase Glargine insulin to 40 units

## 2024-07-02 NOTE — PROGRESS NOTES
[facility] PRE-SURGICAL TESTING INSTRUCTIONS                      PRIOR TO PROCEDURE DATE:    1. PLEASE FOLLOW ANY INSTRUCTIONS GIVEN TO YOU PER YOUR SURGEON.      2. Arrange for someone to drive you home and be with you for the first 24 hours after discharge for your safety after your procedure for which you received sedation. Ensure it is someone we can share information with regarding your discharge.     NOTE: At this time ONLY 2 ADULTS may accompany you   One person ENCOURAGED to stay at hospital entire time if outpatient surgery      3. You must contact your surgeon for instructions IF:  You are taking any blood thinners, aspirin, anti-inflammatory or vitamins.  There is a change in your physical condition such as a cold, fever, rash, cuts, sores, or any other infection, especially near your surgical site.    4. Do not drink alcohol the day before or day of your procedure.  Do not use any recreational marijuana at least 24 hours or street drugs (heroin, cocaine) at minimum 5 days prior to your procedure.     5. A Pre-Surgical History and Physical MUST be completed WITHIN 30 DAYS OR LESS prior to your procedure.by your Physician or an Urgent Care        THE DAY OF YOUR PROCEDURE:  1.  Follow instructions for ARRIVAL TIME as DIRECTED BY YOUR SURGEON.     2. Enter the MAIN entrance from [facility] and follow the signs to the free Parking Garage or  Parking (offered free of charge 7 am-5pm).      3. Enter the Main Entrance of the hospital (do not enter from the lower level of the parking garage). Upon entrance, check in with the  at the surgical information desk on your LEFT.   Bring your insurance card and photo ID to register      4. DO NOT EAT ANYTHING 8 hours prior to arrival for surgery.  You may have up to 8 ounces of water 4 hours prior to your arrival for surgery.   NOTE: ALL Gastric, Bariatric & Bowel surgery patients - you MUST follow your surgeon's instructions regarding  please bring it with you on the day of your procedure.    10. If you use oxygen at home, please bring your oxygen tank with you to hospital..     11. We recommend that valuable personal belongings such as cash, cell phones, e-tablets, or jewelry, be left at home during your stay. The hospital will not be responsible for valuables that are not secured in the hospital safe. However, if your insurance requires a co-pay, you may want to bring a method of payment, i.e., Check or credit card, if you wish to pay your co-pay the day of surgery.      12. If you are to stay overnight, you may bring a bag with personal items. Please have any large items you may need brought in by your family after your arrival to your hospital room.    13. If you have a Living Will or Durable Power of , please bring a copy on the day of your procedure.     How we keep you safe and work to prevent surgical site infections:   1. Health care workers should always check your ID bracelet to verify your name and birth date. You will be asked many times to state your name, date of birth, and allergies.    2. Health care workers should always clean their hands with soap or alcohol gel before providing care to you. It is okay to ask anyone if they cleaned their hands before they touch you.    3. You will be actively involved in verifying the type of procedure you are having and ensuring the correct surgical site. This will be confirmed multiple times prior to your procedure. Do NOT max your surgery site UNLESS instructed to by your surgeon.     4. When you are in the operating room, your surgical site will be cleansed with a special soap, and in most cases, you will be given an antibiotic before the surgery begins.      What to expect AFTER your procedure?  1. Immediately following your procedure, your will be taken to the PACU for the first phase of your recovery.  Your nurse will help you recover from any potential side effects of

## 2024-07-02 NOTE — H&P (VIEW-ONLY)
Preoperative Consultation      Ramona Ragland  YOB: 1965    Date of Service:  2024    Vitals:    24 0907   BP: 128/82   Pulse: 82   SpO2: 96%   Weight: 107 kg (236 lb)           Chief Complaint   Patient presents with    6 Month Follow-Up       HPI:    This patient presents to the office today for a preoperative consultation at the request of surgeon, Dr. Llanes, who plans on performing laparoscopic cholecystectomy on  at [facility].     Planned anesthesia: General   Known anesthesia problems: None   Bleeding risk: No recent or remote history of abnormal bleeding  Personal or FH of DVT/PE: No   Recent steroid use: no  Exercise tolerance: greater than 4 METs         Past Medical History:   Diagnosis Date    Allergic rhinitis     Alopecia     CKD (chronic kidney disease) stage 3, GFR 30-59 ml/min ([facility])     COVID-19 2020    Diabetes mellitus type 2, uncontrolled, with complications 2014    TYPE 2    Fibroids, intramural 2011    History of uterine fibroid     Hyperaldosteronism (HCC)     ?    Hyperlipidemia     Obesity     After I had my first child    ZA (obstructive sleep apnea) 2011    [facility] sleep eval     Osteoarthritis of right knee     secondary hypertension     Vitamin D deficiency 2012     Past Surgical History:   Procedure Laterality Date     SECTION      x3    COLONOSCOPY      fu 5 years    COLONOSCOPY N/A 2022    fu 5 yrs, COLONOSCOPY POLYPECTOMY SNARE/COLD BIOPSY performed by Tristan Nuno MD at [facility] ENDOSCOPY    TUBAL LIGATION  1990    BTL     Family History   Problem Relation Age of Onset    Diabetes Mother         DM 2    Heart Attack Mother     Heart Disease Mother     High Blood Pressure Mother     Cancer Father         Lung    Stroke Father 50        smoker    No Known Problems Brother     Hypertension Sister     Hypertension Brother     No Known Problems Maternal Aunt     No Known Problems  Rhythm: Normal rate and regular rhythm.      Heart sounds: No murmur heard.  Pulmonary:      Effort: No respiratory distress.      Breath sounds: Normal breath sounds. No wheezing or rales.   Abdominal:      General: Bowel sounds are normal. There is no distension.      Palpations: Abdomen is soft.      Tenderness: There is no abdominal tenderness.   Musculoskeletal:         General: Normal range of motion.      Right lower leg: No edema.      Left lower leg: No edema.   Lymphadenopathy:      Cervical: No cervical adenopathy.   Skin:     General: Skin is warm and dry.   Neurological:      Mental Status: She is alert and oriented to person, place, and time.      Cranial Nerves: No cranial nerve deficit.      Sensory: No sensory deficit.      Coordination: Coordination normal.   Psychiatric:         Behavior: Behavior normal.             Assessment/Plan:     1. Preop examination  - EKG 12 Lead bradycardia, otherwise normal    2. Diabetes mellitus due to underlying condition, uncontrolled, with hyperglycemia (HCC)  Diabetes has been recently under much worse control for unclear reasons and medications and diet are in the process of being adjusted. Increase to Glargine 40 units, keep nutritionist appointment, and see me in 6 weeks to follow up.  - POCT glycosylated hemoglobin (Hb A1C) today improved to 12.8 from over 14 in past 4 weeks.     Pre-Operative Risk assessment using 2014 ACC/AHA guidelines     Emergent procedure No  Active Cardiac Condition No (decompensated HF, Arrhythmia, MI <3 weeks, severe valve disease)  Risk Level of Procedure Intermediate Risk (intraperitoneal, intrathoracic, HENT, orthopedic, or carotid endarterectomy, etc.)  Revised Cardiac Risk Index Risk factors: Diabetic treated with insulin  Measurement of Exercise Tolerance before Surgery >4 Yes    According to the 2014 ACC/AHA pre-operative risk assessment guidelines Ramona Ragland is a low risk for major cardiac complications during a  intermediate risk procedure and may continue as planned. Specific medication recommendations:  Skip Rybelus, aspirin, nsaids, vitamins and supplements for 7 days before procedure  Take 30 units Lantus night before procedure  May skip Metformin morning of surgery.  May take bp pills with small sip of water morning of surgery   --    On this date I have spent 30 minutes reviewing previous notes, test results and face to face with the patient discussing the diagnosis and importance of compliance with the treatment plan as well as documenting on the day of the visit.

## 2024-07-11 ENCOUNTER — APPOINTMENT (OUTPATIENT)
Dept: GENERAL RADIOLOGY | Age: 59
End: 2024-07-11
Attending: SURGERY
Payer: COMMERCIAL

## 2024-07-11 ENCOUNTER — ANESTHESIA EVENT (OUTPATIENT)
Dept: OPERATING ROOM | Age: 59
End: 2024-07-11
Payer: COMMERCIAL

## 2024-07-11 ENCOUNTER — ANESTHESIA (OUTPATIENT)
Dept: OPERATING ROOM | Age: 59
End: 2024-07-11
Payer: COMMERCIAL

## 2024-07-11 ENCOUNTER — HOSPITAL ENCOUNTER (OUTPATIENT)
Age: 59
Setting detail: OUTPATIENT SURGERY
Discharge: HOME | End: 2024-07-11
Attending: SURGERY | Admitting: SURGERY
Payer: COMMERCIAL

## 2024-07-11 VITALS
TEMPERATURE: 97 F | HEART RATE: 69 BPM | SYSTOLIC BLOOD PRESSURE: 131 MMHG | BODY MASS INDEX: 42.52 KG/M2 | WEIGHT: 240 LBS | HEIGHT: 63 IN | DIASTOLIC BLOOD PRESSURE: 87 MMHG | RESPIRATION RATE: 18 BRPM | OXYGEN SATURATION: 93 %

## 2024-07-11 DIAGNOSIS — K80.20 SYMPTOMATIC CHOLELITHIASIS: ICD-10-CM

## 2024-07-11 LAB
GLUCOSE BLD-MCNC: 148 MG/DL (ref 70–99)
GLUCOSE BLD-MCNC: 194 MG/DL (ref 70–99)
PERFORMED ON: ABNORMAL
PERFORMED ON: ABNORMAL

## 2024-07-11 PROCEDURE — 6360000002 HC RX W HCPCS: Performed by: SURGERY

## 2024-07-11 PROCEDURE — 7100000001 HC PACU RECOVERY - ADDTL 15 MIN: Performed by: SURGERY

## 2024-07-11 PROCEDURE — 2500000003 HC RX 250 WO HCPCS

## 2024-07-11 PROCEDURE — 6360000004 HC RX CONTRAST MEDICATION: Performed by: SURGERY

## 2024-07-11 PROCEDURE — 2580000003 HC RX 258: Performed by: ANESTHESIOLOGY

## 2024-07-11 PROCEDURE — 7100000000 HC PACU RECOVERY - FIRST 15 MIN: Performed by: SURGERY

## 2024-07-11 PROCEDURE — C1758 CATHETER, URETERAL: HCPCS | Performed by: SURGERY

## 2024-07-11 PROCEDURE — 3600000004 HC SURGERY LEVEL 4 BASE: Performed by: SURGERY

## 2024-07-11 PROCEDURE — 2580000003 HC RX 258: Performed by: SURGERY

## 2024-07-11 PROCEDURE — 6360000002 HC RX W HCPCS

## 2024-07-11 PROCEDURE — 3600000014 HC SURGERY LEVEL 4 ADDTL 15MIN: Performed by: SURGERY

## 2024-07-11 PROCEDURE — 88304 TISSUE EXAM BY PATHOLOGIST: CPT

## 2024-07-11 PROCEDURE — 3700000001 HC ADD 15 MINUTES (ANESTHESIA): Performed by: SURGERY

## 2024-07-11 PROCEDURE — 74300 X-RAY BILE DUCTS/PANCREAS: CPT

## 2024-07-11 PROCEDURE — 7100000011 HC PHASE II RECOVERY - ADDTL 15 MIN: Performed by: SURGERY

## 2024-07-11 PROCEDURE — 7100000010 HC PHASE II RECOVERY - FIRST 15 MIN: Performed by: SURGERY

## 2024-07-11 PROCEDURE — 6370000000 HC RX 637 (ALT 250 FOR IP): Performed by: ANESTHESIOLOGY

## 2024-07-11 PROCEDURE — 2709999900 HC NON-CHARGEABLE SUPPLY: Performed by: SURGERY

## 2024-07-11 PROCEDURE — 6360000002 HC RX W HCPCS: Performed by: ANESTHESIOLOGY

## 2024-07-11 PROCEDURE — 3700000000 HC ANESTHESIA ATTENDED CARE: Performed by: SURGERY

## 2024-07-11 RX ORDER — METHOCARBAMOL 100 MG/ML
INJECTION, SOLUTION INTRAMUSCULAR; INTRAVENOUS PRN
Status: DISCONTINUED | OUTPATIENT
Start: 2024-07-11 | End: 2024-07-11 | Stop reason: SDUPTHER

## 2024-07-11 RX ORDER — SODIUM CHLORIDE, SODIUM LACTATE, POTASSIUM CHLORIDE, CALCIUM CHLORIDE 600; 310; 30; 20 MG/100ML; MG/100ML; MG/100ML; MG/100ML
INJECTION, SOLUTION INTRAVENOUS CONTINUOUS
Status: DISCONTINUED | OUTPATIENT
Start: 2024-07-11 | End: 2024-07-11 | Stop reason: HOSPADM

## 2024-07-11 RX ORDER — OXYCODONE HYDROCHLORIDE 5 MG/1
5 TABLET ORAL EVERY 6 HOURS PRN
Qty: 20 TABLET | Refills: 0 | Status: SHIPPED | OUTPATIENT
Start: 2024-07-11 | End: 2024-07-16

## 2024-07-11 RX ORDER — ACETAMINOPHEN 500 MG
1000 TABLET ORAL ONCE
Status: DISCONTINUED | OUTPATIENT
Start: 2024-07-11 | End: 2024-07-11 | Stop reason: HOSPADM

## 2024-07-11 RX ORDER — SODIUM CHLORIDE 9 MG/ML
INJECTION, SOLUTION INTRAVENOUS PRN
Status: DISCONTINUED | OUTPATIENT
Start: 2024-07-11 | End: 2024-07-11 | Stop reason: HOSPADM

## 2024-07-11 RX ORDER — SODIUM CHLORIDE 0.9 % (FLUSH) 0.9 %
5-40 SYRINGE (ML) INJECTION PRN
Status: DISCONTINUED | OUTPATIENT
Start: 2024-07-11 | End: 2024-07-11 | Stop reason: HOSPADM

## 2024-07-11 RX ORDER — DEXAMETHASONE SODIUM PHOSPHATE 4 MG/ML
INJECTION, SOLUTION INTRA-ARTICULAR; INTRALESIONAL; INTRAMUSCULAR; INTRAVENOUS; SOFT TISSUE PRN
Status: DISCONTINUED | OUTPATIENT
Start: 2024-07-11 | End: 2024-07-11 | Stop reason: SDUPTHER

## 2024-07-11 RX ORDER — ACETAMINOPHEN 325 MG/1
650 TABLET ORAL
Status: DISCONTINUED | OUTPATIENT
Start: 2024-07-11 | End: 2024-07-11 | Stop reason: HOSPADM

## 2024-07-11 RX ORDER — ROCURONIUM BROMIDE 10 MG/ML
INJECTION, SOLUTION INTRAVENOUS PRN
Status: DISCONTINUED | OUTPATIENT
Start: 2024-07-11 | End: 2024-07-11 | Stop reason: SDUPTHER

## 2024-07-11 RX ORDER — SCOLOPAMINE TRANSDERMAL SYSTEM 1 MG/1
1 PATCH, EXTENDED RELEASE TRANSDERMAL
Status: DISCONTINUED | OUTPATIENT
Start: 2024-07-11 | End: 2024-07-11 | Stop reason: HOSPADM

## 2024-07-11 RX ORDER — LIDOCAINE HYDROCHLORIDE 20 MG/ML
INJECTION, SOLUTION INTRAVENOUS PRN
Status: DISCONTINUED | OUTPATIENT
Start: 2024-07-11 | End: 2024-07-11 | Stop reason: SDUPTHER

## 2024-07-11 RX ORDER — PROPOFOL 10 MG/ML
INJECTION, EMULSION INTRAVENOUS PRN
Status: DISCONTINUED | OUTPATIENT
Start: 2024-07-11 | End: 2024-07-11 | Stop reason: SDUPTHER

## 2024-07-11 RX ORDER — MIDAZOLAM HYDROCHLORIDE 1 MG/ML
INJECTION INTRAMUSCULAR; INTRAVENOUS PRN
Status: DISCONTINUED | OUTPATIENT
Start: 2024-07-11 | End: 2024-07-11 | Stop reason: SDUPTHER

## 2024-07-11 RX ORDER — FENTANYL CITRATE 50 UG/ML
INJECTION, SOLUTION INTRAMUSCULAR; INTRAVENOUS PRN
Status: DISCONTINUED | OUTPATIENT
Start: 2024-07-11 | End: 2024-07-11 | Stop reason: SDUPTHER

## 2024-07-11 RX ORDER — SODIUM CHLORIDE 0.9 % (FLUSH) 0.9 %
5-40 SYRINGE (ML) INJECTION EVERY 12 HOURS SCHEDULED
Status: DISCONTINUED | OUTPATIENT
Start: 2024-07-11 | End: 2024-07-11 | Stop reason: HOSPADM

## 2024-07-11 RX ORDER — BUPIVACAINE HYDROCHLORIDE 5 MG/ML
INJECTION, SOLUTION EPIDURAL; INTRACAUDAL PRN
Status: DISCONTINUED | OUTPATIENT
Start: 2024-07-11 | End: 2024-07-11 | Stop reason: ALTCHOICE

## 2024-07-11 RX ORDER — NALOXONE HYDROCHLORIDE 0.4 MG/ML
INJECTION, SOLUTION INTRAMUSCULAR; INTRAVENOUS; SUBCUTANEOUS PRN
Status: DISCONTINUED | OUTPATIENT
Start: 2024-07-11 | End: 2024-07-11 | Stop reason: HOSPADM

## 2024-07-11 RX ORDER — HYDROMORPHONE HYDROCHLORIDE 2 MG/ML
INJECTION, SOLUTION INTRAMUSCULAR; INTRAVENOUS; SUBCUTANEOUS PRN
Status: DISCONTINUED | OUTPATIENT
Start: 2024-07-11 | End: 2024-07-11 | Stop reason: SDUPTHER

## 2024-07-11 RX ORDER — PHENYLEPHRINE HYDROCHLORIDE 10 MG/ML
INJECTION INTRAVENOUS PRN
Status: DISCONTINUED | OUTPATIENT
Start: 2024-07-11 | End: 2024-07-11 | Stop reason: SDUPTHER

## 2024-07-11 RX ORDER — OXYCODONE HYDROCHLORIDE 5 MG/1
5 TABLET ORAL ONCE
Status: COMPLETED | OUTPATIENT
Start: 2024-07-11 | End: 2024-07-11

## 2024-07-11 RX ORDER — ONDANSETRON 2 MG/ML
INJECTION INTRAMUSCULAR; INTRAVENOUS PRN
Status: DISCONTINUED | OUTPATIENT
Start: 2024-07-11 | End: 2024-07-11 | Stop reason: SDUPTHER

## 2024-07-11 RX ORDER — GLYCOPYRROLATE 0.2 MG/ML
INJECTION INTRAMUSCULAR; INTRAVENOUS PRN
Status: DISCONTINUED | OUTPATIENT
Start: 2024-07-11 | End: 2024-07-11 | Stop reason: SDUPTHER

## 2024-07-11 RX ORDER — HYDROMORPHONE HYDROCHLORIDE 1 MG/ML
0.25 INJECTION, SOLUTION INTRAMUSCULAR; INTRAVENOUS; SUBCUTANEOUS EVERY 5 MIN PRN
Status: DISCONTINUED | OUTPATIENT
Start: 2024-07-11 | End: 2024-07-11 | Stop reason: HOSPADM

## 2024-07-11 RX ORDER — APREPITANT 40 MG/1
40 CAPSULE ORAL ONCE
Status: DISCONTINUED | OUTPATIENT
Start: 2024-07-11 | End: 2024-07-11 | Stop reason: HOSPADM

## 2024-07-11 RX ORDER — HALOPERIDOL 5 MG/ML
1 INJECTION INTRAMUSCULAR
Status: DISCONTINUED | OUTPATIENT
Start: 2024-07-11 | End: 2024-07-11 | Stop reason: HOSPADM

## 2024-07-11 RX ORDER — DOCUSATE SODIUM 100 MG/1
100 CAPSULE, LIQUID FILLED ORAL 2 TIMES DAILY
Qty: 30 CAPSULE | Refills: 0 | Status: SHIPPED | OUTPATIENT
Start: 2024-07-11

## 2024-07-11 RX ADMIN — SODIUM CHLORIDE, POTASSIUM CHLORIDE, SODIUM LACTATE AND CALCIUM CHLORIDE: 600; 310; 30; 20 INJECTION, SOLUTION INTRAVENOUS at 14:54

## 2024-07-11 RX ADMIN — MIDAZOLAM HYDROCHLORIDE 1 MG: 2 INJECTION, SOLUTION INTRAMUSCULAR; INTRAVENOUS at 14:22

## 2024-07-11 RX ADMIN — HYDROMORPHONE HYDROCHLORIDE 0.5 MG: 2 INJECTION, SOLUTION INTRAMUSCULAR; INTRAVENOUS; SUBCUTANEOUS at 15:17

## 2024-07-11 RX ADMIN — GLYCOPYRROLATE 0.2 MG: 0.2 INJECTION INTRAMUSCULAR; INTRAVENOUS at 14:38

## 2024-07-11 RX ADMIN — DEXAMETHASONE SODIUM PHOSPHATE 4 MG: 4 INJECTION INTRA-ARTICULAR; INTRALESIONAL; INTRAMUSCULAR; INTRAVENOUS; SOFT TISSUE at 14:17

## 2024-07-11 RX ADMIN — WATER 2000 MG: 1 INJECTION INTRAMUSCULAR; INTRAVENOUS; SUBCUTANEOUS at 14:21

## 2024-07-11 RX ADMIN — FENTANYL CITRATE 50 MCG: 50 INJECTION, SOLUTION INTRAMUSCULAR; INTRAVENOUS at 14:05

## 2024-07-11 RX ADMIN — GLYCOPYRROLATE 0.2 MG: 0.2 INJECTION INTRAMUSCULAR; INTRAVENOUS at 14:32

## 2024-07-11 RX ADMIN — LIDOCAINE HYDROCHLORIDE 100 MG: 20 INJECTION, SOLUTION INTRAVENOUS at 14:10

## 2024-07-11 RX ADMIN — OXYCODONE 5 MG: 5 TABLET ORAL at 18:02

## 2024-07-11 RX ADMIN — ROCURONIUM BROMIDE 20 MG: 10 INJECTION, SOLUTION INTRAVENOUS at 15:04

## 2024-07-11 RX ADMIN — PROPOFOL 140 MG: 10 INJECTION, EMULSION INTRAVENOUS at 14:10

## 2024-07-11 RX ADMIN — SUGAMMADEX 200 MG: 100 INJECTION, SOLUTION INTRAVENOUS at 15:30

## 2024-07-11 RX ADMIN — ONDANSETRON 4 MG: 2 INJECTION INTRAMUSCULAR; INTRAVENOUS at 14:26

## 2024-07-11 RX ADMIN — METHOCARBAMOL 1000 MG: 100 INJECTION, SOLUTION INTRAMUSCULAR; INTRAVENOUS at 14:51

## 2024-07-11 RX ADMIN — HYDROMORPHONE HYDROCHLORIDE 0.25 MG: 1 INJECTION, SOLUTION INTRAMUSCULAR; INTRAVENOUS; SUBCUTANEOUS at 16:41

## 2024-07-11 RX ADMIN — SODIUM CHLORIDE, POTASSIUM CHLORIDE, SODIUM LACTATE AND CALCIUM CHLORIDE: 600; 310; 30; 20 INJECTION, SOLUTION INTRAVENOUS at 09:48

## 2024-07-11 RX ADMIN — MIDAZOLAM HYDROCHLORIDE 1 MG: 2 INJECTION, SOLUTION INTRAMUSCULAR; INTRAVENOUS at 14:04

## 2024-07-11 RX ADMIN — ROCURONIUM BROMIDE 30 MG: 10 INJECTION, SOLUTION INTRAVENOUS at 14:22

## 2024-07-11 RX ADMIN — PHENYLEPHRINE HYDROCHLORIDE 100 MCG: 10 INJECTION, SOLUTION INTRAVENOUS at 14:32

## 2024-07-11 RX ADMIN — ROCURONIUM BROMIDE 50 MG: 10 INJECTION, SOLUTION INTRAVENOUS at 14:10

## 2024-07-11 RX ADMIN — FENTANYL CITRATE 50 MCG: 50 INJECTION, SOLUTION INTRAMUSCULAR; INTRAVENOUS at 15:11

## 2024-07-11 ASSESSMENT — PAIN SCALES - GENERAL
PAINLEVEL_OUTOF10: 0
PAINLEVEL_OUTOF10: 0
PAINLEVEL_OUTOF10: 5
PAINLEVEL_OUTOF10: 6
PAINLEVEL_OUTOF10: 5
PAINLEVEL_OUTOF10: 5

## 2024-07-11 ASSESSMENT — PAIN DESCRIPTION - ORIENTATION
ORIENTATION: ANTERIOR
ORIENTATION: ANTERIOR

## 2024-07-11 ASSESSMENT — PAIN DESCRIPTION - DESCRIPTORS
DESCRIPTORS: DISCOMFORT
DESCRIPTORS: GNAWING

## 2024-07-11 ASSESSMENT — PAIN DESCRIPTION - LOCATION
LOCATION: ABDOMEN
LOCATION: ABDOMEN

## 2024-07-11 ASSESSMENT — PAIN DESCRIPTION - FREQUENCY: FREQUENCY: CONTINUOUS

## 2024-07-11 ASSESSMENT — PAIN DESCRIPTION - ONSET: ONSET: AWAKENED FROM SLEEP

## 2024-07-11 ASSESSMENT — PAIN DESCRIPTION - PAIN TYPE: TYPE: SURGICAL PAIN

## 2024-07-11 NOTE — ANESTHESIA PRE PROCEDURE
Department of Anesthesiology  Preprocedure Note       Name:  Ramona Ragland   Age:  58 y.o.  :  1965                                          MRN:  [id number]         Date:  2024      Surgeon: Surgeon(s):  Amos Llanes MD    Procedure: Procedure(s):  Laparoscopic Cholecystectomy with Cholangiogram    Medications prior to admission:   Prior to Admission medications    Medication Sig Start Date End Date Taking? Authorizing Provider   Semaglutide (RYBELSUS PO) Take by mouth Does not know how many mg - thinks 7mg   Yes Provider, MD Sukhi   insulin glargine (LANTUS SOLOSTAR) 100 UNIT/ML injection pen Inject 40 Units into the skin nightly 24  Julieta Herrera MD   Insulin Pen Needle 32G X 4 MM MISC 1 each by Does not apply route daily 6/7/24 9/15/24  Julieta Herrera MD   amLODIPine (NORVASC) 5 MG tablet TAKE 1 TABLET BY MOUTH DAILY 24   Julieta Herrera MD   atorvastatin (LIPITOR) 40 MG tablet TAKE 1 TABLET BY MOUTH DAILY 24   Julieta Herrera MD   metFORMIN (GLUCOPHAGE-XR) 500 MG extended release tablet TAKE FOUR TABLETS BY MOUTH DAILY WITH BREAKFAST 3/26/24   Julieta Herrera MD   spironolactone (ALDACTONE) 100 MG tablet TAKE 1 TABLET BY MOUTH DAILY 3/26/24   Julieta Herrera MD   atenolol (TENORMIN) 50 MG tablet TAKE 1/2 TABLET BY MOUTH DAILY 1/15/24   Julieta Herrera MD   blood glucose test strips (ONETOUCH ULTRA) strip Use to test up to 3 times per day 23   Julieta Herrera MD   OneTouch Delica Lancets 33G MISC Use to Test up to 3 times per day 23   Julieta Herrera MD   vitamin B-12 (CYANOCOBALAMIN) 500 MCG tablet Take 1 tablet by mouth daily 23  Julieta Herrera MD   fluticasone (FLONASE) 50 MCG/ACT nasal spray 1 spray by Each Nostril route daily as needed for Rhinitis    Provider, MD Sukhi   Blood Glucose Monitoring Suppl ([facility]URA

## 2024-07-11 NOTE — INTERVAL H&P NOTE
Update History & Physical    The patient's History and Physical of July 2,  was reviewed with the patient and I examined the patient. There was no change. The surgical site was confirmed by the patient and me.     Plan: The risks, benefits, expected outcome, and alternative to the recommended procedure have been discussed with the patient. Patient understands and wants to proceed with the procedure.     Electronically signed by Дмитрий Tineo DO on 7/11/2024 at 1:20 PM

## 2024-07-11 NOTE — DISCHARGE INSTRUCTIONS
Discharge Instructions:    Diet:   You may resume a regular diet.    Wound Care:   Skin glue was used to cover your incision(s). It will fall off on its own in about 10 days. You may shower, but do not scrub the incision sites directly or soak (tub, pool, etc.).    Activity:   No heavy lifting greater than a milk jug until follow up.    Pain management:   Unless informed of any restrictions by your primary care physician, please use your preferred over-the-counter pain reliever as your primary pain medication. If you have pain that persists despite over-the-counter pain medications, you have been provided with a prescription for an opioid/narcotic pain reliever (Percocet). Be aware that this medication is a combination of opioid/narcotic and acetaminophen (the main ingredient in Tylenol); therefore, it will contribute to your daily limit of 4,000mg acetaminophen.   No driving or operating machinery while taking opioid/narcotic medications.     Bowel Regimen:   Opioid/Narcotic pain relievers have a common side effect of constipation; therefore, you have been provided with a prescription for a stool softener, Docusate (Colace).  These medications are intended to help prevent you from experiencing this very common side effect and also help to regulate your bowels after surgery.   If your stools become too loose and/or frequent, decrease the Colace to one pill one time each day. If your stools are still loose after this modification, stop taking this medication all together.    Return Precautions:   Call/ Return to ED for increased redness, worsening pain, drainage from wound, fevers, or any other concerns about your incision or post op course.      Follow up with Dr. Llanes in 1-2 weeks. Please call [phone] to schedule your appointment.    [facility] AMBULATORY PROCEDURE DISCHARGE INSTRUCTIONS    There are potential side effects of anesthesia or sedation you may experience for the first 24 hours.  These  side effects include:    Confusion or Memory loss, Dizziness, or Delayed Reaction Times   [x]A responsible person should be with you for the next 24 hours.  Do not operate any vehicles (automobiles, bicycles, motorcycles) or power tools or machinery for 24 hours.  Do not sign any legal documents or make any legal decisions for 24 hours. Do not drink alcohol for 24 hours or while taking narcotic pain medication.      Nausea    [x]Start with light diet and progress to your normal diet as you feel like eating. However, if you experience nausea or repeated episodes of vomiting which persist beyond 12-24 hours, notify your physician.  Once nausea has passed, remember to keep drinking fluids.    Difficulty Passing Urine  [x]Drink extra amounts of fluid today.  Notify your physician if you have not urinated within 8 hours after your procedure or you feel uncomfortable.      Irritated Throat from a Breathing Tube  [x]Drink extra amounts of fluid today.  Lozenges may help.    Muscle Aches  [x]You may experience some generalized body aches as your muscles recover from medications used to relax them during surgery.  These will gradually subside.    MEDICATION INSTRUCTIONS:  [x]Prescription(S) x    2  sent with you.  Use as directed.  When taking pain medications, you may experience the side effect of dizziness or drowsiness.  Do not drink alcohol or drive when taking these medications.  []Prescription(S) x          Called to Pharmacy Name and location:    [x]Give the list of your medications to your primary care physician on your next visit. Keep your med list updated and carry it with in case of emergencies.    [x] Narcotic pain medications can cause the side effect of significant constipation.  You may want to add a stool softener to your postoperative medication schedule or speak to your surgeon on how best to manage this side effect.    NARCOTIC SAFETY:  Your pain medicine is only for you to take.  Safely store your

## 2024-07-11 NOTE — PROGRESS NOTES
Patient arrived from OR to PACU # 3 s/p Laparoscopic Cholecystectomy with Cholangiogram per . Attached to PACU monitoring device report received from CRNA who stated no problem intraoperatively. Opens eyes intermittently but overall drowsy from anesthesia.

## 2024-07-11 NOTE — PROGRESS NOTES
Ambulatory Surgery/Procedure Discharge Note    Vitals:    07/11/24 1802   BP:    Pulse:    Resp:    Temp: 97 °F (36.1 °C)   SpO2:      BP: 131/87, Pulse: 89. Resp: 18, Spo2: 93% RA    In: 1700 [I.V.:1700]  Out: -     Restroom use offered before discharge.  Yes    Pain assessment:  present - adequately treated & ABD binder put on patient.  Pain Level: 5    Discharge order obtained, and discharge instructions reviewed. Patient and family educated, using the teach back method, about follow up instructions and discharge instructions. A completed copy of the AVS instructions given to patient and all questions answered. IV catheter removed without complaints, catheter intact, site WNL.   Patient discharged to home/self care. Patient discharged via wheel chair by transporter to waiting family/S.O.       7/11/2024 6:21 PM

## 2024-07-11 NOTE — PROGRESS NOTES
Patient moaning out in pain holding belly. Rates \"6\" medicated with PRN Dilaudid. Additional warm blankets applied.  called and updated.

## 2024-07-11 NOTE — PROGRESS NOTES
Took few ice chips, pain down to a \"5\" appears to be lightweight with medication. Sleeps easily, VSS on RA Repositioned, hob elevated.

## 2024-07-11 NOTE — PROGRESS NOTES
PACU Transfer to [facility]    Vitals:    07/11/24 1730   BP: (!) 145/86   Pulse: 72   Resp: 17   Temp: 97 °F (36.1 °C)   SpO2: 93%         Intake/Output Summary (Last 24 hours) at 7/11/2024 1737  Last data filed at 7/11/2024 1546  Gross per 24 hour   Intake 1700 ml   Output --   Net 1700 ml       Pain assessment:  receiving treatment  Pain Level: 5    Patient transferred to care of GALLO RN.    7/11/2024 5:37 PM

## 2024-07-11 NOTE — BRIEF OP NOTE
Brief Postoperative Note      Patient: Ramona Ragland  YOB: 1965  MRN: [id number]    Date of Procedure: 7/11/2024    Pre-Op Diagnosis Codes:     * Symptomatic cholelithiasis [K80.20]    Post-Op Diagnosis: Same       Procedure(s):  Laparoscopic Cholecystectomy with Cholangiogram    Surgeon(s):  Amos Llanes MD    Assistant:  Resident: Дмитрий Tineo DO    Anesthesia: General    Estimated Blood Loss (mL): Minimal    Complications: None    Specimens:   ID Type Source Tests Collected by Time Destination   A : GALLBLADDER Tissue Tissue SURGICAL PATHOLOGY Amos Llanes MD 7/11/2024 1455        Implants:  * No implants in log *      Drains:   NG/OG/NJ/NE Tube Orogastric 18 fr Center mouth (Active)       Findings:  Infection Present At Time Of Surgery (PATOS) (choose all levels that have infection present):  No infection present  Other Findings: Normal appearing gallbladder with gallstones palpated in gallbladder. Cystic artery and cystic duct identified, clipped and dissected. Intraoperative cholangiogram of the cystic duct with good filling of the ducts. Small tear noted on liver bed that electrocautery was utilized to stop any oozing from the exposed liver bed. Gallbladder fossa irrigated and suctioned with good hemostasis.     Electronically signed by Дмитрий Tineo DO on 7/11/2024 at 3:35 PM

## 2024-07-12 ENCOUNTER — TELEPHONE (OUTPATIENT)
Dept: SURGERY | Age: 59
End: 2024-07-12

## 2024-07-12 NOTE — TELEPHONE ENCOUNTER
Patient called requesting a letter be sent to her via email, to give to work for her time off.    Please send this to [email or web address] and CC her personal email address: [email or web address]

## 2024-07-12 NOTE — ANESTHESIA POSTPROCEDURE EVALUATION
Department of Anesthesiology  Postprocedure Note    Patient: Ramona Ragland  MRN: [id number]  YOB: 1965  Date of evaluation: 7/11/2024    Procedure Summary       Date: 07/11/24 Room / Location: [address]    Anesthesia Start: 1406 Anesthesia Stop: 1546    Procedure: Laparoscopic Cholecystectomy with Cholangiogram Diagnosis:       Symptomatic cholelithiasis      (Symptomatic cholelithiasis [K80.20])    Surgeons: Amos Llanes MD Responsible Provider: Eduardo Hale DO    Anesthesia Type: General ASA Status: 3            Anesthesia Type: General    Gualberto Phase I: Gualberto Score: 10    Gualberto Phase II: Gualberto Score: 10    Vitals:    07/11/24 1802   BP:    Pulse:    Resp:    Temp: 97 °F (36.1 °C)   SpO2:        Anesthesia Post Evaluation    Patient location during evaluation: PACU  Patient participation: complete - patient participated  Level of consciousness: awake and awake and alert  Pain score: 2  Airway patency: patent  Nausea & Vomiting: no nausea and no vomiting  Cardiovascular status: hemodynamically stable  Respiratory status: acceptable  Hydration status: euvolemic  Pain management: adequate and satisfactory to patient        No notable events documented.

## 2024-07-12 NOTE — PROGRESS NOTES
Subjective:      Patient ID: Tyesha Jung is a 55 y.o. female. Patient is here for annual. Patient in menopause. Gynecologic Exam      Review of Systems   Constitutional: Negative. HENT: Negative. Eyes: Negative. Respiratory: Negative. Cardiovascular: Negative. Gastrointestinal: Negative. Endocrine: Negative. Genitourinary: Negative. Musculoskeletal: Negative. Skin: Negative. Allergic/Immunologic: Negative. Neurological: Negative. Hematological: Negative. Psychiatric/Behavioral: Negative. Date of Birth 1965  Past Medical History:   Diagnosis Date    Allergic rhinitis     Alopecia     COVID-19 2020    Diabetes mellitus type 2, uncontrolled, with complications ([facility] [address]     TYPE 2    Fibroids, intramural 2011    History of uterine fibroid     Hyperaldosteronism ([facility] [address]     ? Hyperlipidemia     ZA (obstructive sleep apnea) 2011    [facility] sleep eval     Osteoarthritis of right knee     secondary hypertension     Vitamin D deficiency 2012     Past Surgical History:   Procedure Laterality Date     SECTION      x3    COLONOSCOPY      fu 5 years    COLONOSCOPY N/A 2022    fu 5 yrs, COLONOSCOPY POLYPECTOMY SNARE/COLD BIOPSY performed by Nicole Rogel MD at [facility] [address]  1990    BTL     OB History    Para Term  AB Living   3 3 3     3   SAB IAB Ectopic Molar Multiple Live Births             3      # Outcome Date GA Lbr Alexey/2nd Weight Sex Delivery Anes PTL Lv   3 Term 200 37w0d   M CS-Unspec   ROSELIA   2 Term 80 37w0d   F CS-Unspec   ROSELIA   1 Term 1986 40w0d   M CS-Unspec   ROSELIA     Social History     Socioeconomic History    Marital status:      Spouse name: Alea Paz    Number of children: 3    Years of education: Not on file    Highest education level: Not on file   Occupational History    Occupation:      Comment: fulltime.  [employer] Working cooperatively Tobacco Use    Smoking status: Never    Smokeless tobacco: Never    Tobacco comments:     passive smoke exposure    Vaping Use    Vaping Use: Never used   Substance and Sexual Activity    Alcohol use:  Yes     Alcohol/week: 2.0 standard drinks     Types: 2 Glasses of wine per week     Comment: occasionally     Drug use: No    Sexual activity: Not Currently     Partners: Male   Other Topics Concern    Not on file   Social History Narrative    3 children, 12 grandchildren     Social Determinants of Health     Financial Resource Strain: Low Risk     Difficulty of Paying Living Expenses: Not hard at all   Food Insecurity: No Food Insecurity    Worried About Running Out of Food in the Last Year: Never true    Ran Out of Food in the Last Year: Never true   Transportation Needs: Not on file   Physical Activity: Not on file   Stress: Not on file   Social Connections: Not on file   Intimate Partner Violence: Not on file   Housing Stability: Not on file     Allergies   Allergen Reactions    Prednisone Other (See Comments)     Made her feel loopy     Outpatient Medications Marked as Taking for the 9/27/22 encounter (Office Visit) with Juan Goins MD   Medication Sig Dispense Refill    empagliflozin (JARDIANCE) 10 MG tablet Take 1 tablet by mouth daily 30 tablet 2    atenolol (TENORMIN) 50 MG tablet Take 0.5 tablets by mouth daily 30 tablet 5    atorvastatin (LIPITOR) 40 MG tablet TAKE ONE TABLET BY MOUTH DAILY 30 tablet 5    Dulaglutide (TRULICITY) 3 ZX/2.0OO SOPN Inject 3 mg into the skin once a week 4 pen 3    spironolactone (ALDACTONE) 100 MG tablet TAKE ONE TABLET BY MOUTH DAILY 30 tablet 4    amLODIPine (NORVASC) 5 MG tablet TAKE ONE TABLET BY MOUTH DAILY 30 tablet 5    metFORMIN (GLUCOPHAGE-XR) 500 mg extended release tablet TAKE FOUR TABLETS BY MOUTH DAILY WITH BREAKFAST 120 tablet 3    OneTouch Delica Lancets 71H MISC Test 3 times daily E11.9 100 each 10    Insulin Pen Needle 29G X 12.7MM MISC 1 each by Does not apply route daily 100 each 3    ONETOUCH ULTRA strip USE TO TEST THREE TIMES A  strip 1    fluticasone (FLONASE) 50 MCG/ACT nasal spray 1 spray by Each Nostril route daily as needed for Rhinitis      Blood Glucose Monitoring Suppl (ACURA BLOOD GLUCOSE METER) w/Device KIT Inject 1 Applicatorful into the skin 2 times daily (before meals) 1 kit 0     Family History   Problem Relation Age of Onset    Diabetes Mother         DM 2    Heart Attack Mother     Heart Disease Mother     High Blood Pressure Mother     Cancer Father         Lung    Stroke Father 48        smoker    No Known Problems Brother     Hypertension Sister     Hypertension Brother     No Known Problems Maternal Aunt     No Known Problems Maternal Uncle     No Known Problems Paternal Aunt     No Known Problems Paternal Uncle     No Known Problems Maternal Grandmother     No Known Problems Maternal Grandfather     No Known Problems Paternal Grandmother     No Known Problems Paternal Grandfather     No Known Problems Other     Breast Cancer Maternal Cousin     Rheum Arthritis Neg Hx     Osteoarthritis Neg Hx     Asthma Neg Hx     Heart Failure Neg Hx     High Cholesterol Neg Hx     Migraines Neg Hx     Ovarian Cancer Neg Hx     Rashes/Skin Problems Neg Hx     Seizures Neg Hx     Thyroid Disease Neg Hx      /84 (Site: Right Upper Arm, Position: Sitting, Cuff Size: Large Adult)   Pulse 74   Resp 17   Ht 5' 3\" (1.6 m)   Wt 259 lb 12.8 oz (117.8 kg)   LMP  (LMP Unknown)   SpO2 99%   BMI 46.02 kg/m²       Objective:   Physical Exam  Constitutional:       General: She is not in acute distress. Appearance: Normal appearance. She is well-developed and normal weight. She is not diaphoretic. HENT:      Head: Normocephalic and atraumatic. Nose: Nose normal.      Mouth/Throat:      Mouth: Mucous membranes are moist.      Pharynx: Oropharynx is clear. Eyes:      Extraocular Movements: Extraocular movements intact.    Neck:      Thyroid: No thyromegaly. Cardiovascular:      Rate and Rhythm: Normal rate and regular rhythm. Heart sounds: Normal heart sounds. No murmur heard. No friction rub. No gallop. Pulmonary:      Effort: Pulmonary effort is normal. No respiratory distress. Breath sounds: Normal breath sounds. No wheezing or rales. Chest:   Breasts:     Right: Normal. No swelling, bleeding, inverted nipple, mass, nipple discharge, skin change or tenderness. Left: Normal. No swelling, bleeding, inverted nipple, mass, nipple discharge, skin change or tenderness. Abdominal:      General: Abdomen is flat. Bowel sounds are normal. There is no distension. Palpations: Abdomen is soft. There is no hepatomegaly or mass. Tenderness: There is no abdominal tenderness. There is no guarding or rebound. Hernia: No hernia is present. There is no hernia in the left inguinal area. Genitourinary:     General: Normal vulva. Exam position: Lithotomy position. Pubic Area: No rash. Labia:         Right: No rash, tenderness, lesion or injury. Left: No rash, tenderness, lesion or injury. Urethra: No prolapse, urethral pain, urethral swelling or urethral lesion. Vagina: Normal. No signs of injury and foreign body. No vaginal discharge, erythema, tenderness or bleeding. Cervix: No cervical motion tenderness, discharge, friability, lesion, erythema, cervical bleeding or eversion. Uterus: Not deviated, not enlarged, not fixed, not tender and no uterine prolapse. Adnexa:         Right: No mass, tenderness or fullness. Left: No mass, tenderness or fullness. Rectum: Normal. Guaiac result negative. No mass, tenderness, anal fissure, external hemorrhoid or internal hemorrhoid. Normal anal tone. Comments: Normal urethral meatus, nl urethra, nl bladder. Musculoskeletal:         General: No swelling, tenderness, deformity or signs of injury. Normal range of motion. Cervical back: Normal range of motion and neck supple. No rigidity. Lymphadenopathy:      Cervical: No cervical adenopathy. Lower Body: No right inguinal adenopathy. No left inguinal adenopathy. Skin:     General: Skin is warm and dry. Coloration: Skin is not jaundiced or pale. Findings: No bruising, erythema, lesion or rash. Neurological:      General: No focal deficit present. Mental Status: She is alert and oriented to person, place, and time. Deep Tendon Reflexes: Reflexes are normal and symmetric. Psychiatric:         Mood and Affect: Mood normal.         Behavior: Behavior normal.         Thought Content:  Thought content normal.         Judgment: Judgment normal.       Assessment:      Annual  menopause      Plan:      Pap, calcium, exercise, mammogram, hemocult negative  stable        Bj Lucero MD Abnormal as indicated, otherwise normal... Normal volume, rate, productivity, spontaneity and articulation Normal volume, rate, productivity, spontaneity and articulation Normal volume, rate, productivity, spontaneity and articulation Normal volume, rate, productivity, spontaneity and articulation Normal volume, rate, productivity, spontaneity and articulation Normal volume, rate, productivity, spontaneity and articulation Normal volume, rate, productivity, spontaneity and articulation

## 2024-07-16 NOTE — TELEPHONE ENCOUNTER
The patient returned the call mentioned below. She is planning to return 7/22/24 with any restrictions needed at that time.    Please call with any further questions. [phone]

## 2024-07-17 ENCOUNTER — TELEPHONE (OUTPATIENT)
Dept: INTERNAL MEDICINE CLINIC | Age: 59
End: 2024-07-17

## 2024-07-17 RX ORDER — SEMAGLUTIDE 1.34 MG/ML
1 INJECTION, SOLUTION SUBCUTANEOUS
Qty: 3 ML | Refills: 0 | Status: SHIPPED | OUTPATIENT
Start: 2024-07-17 | End: 2024-08-14

## 2024-07-17 NOTE — TELEPHONE ENCOUNTER
Patient is calling in for  in regards to trying to get her medication at the pharmacy without a script. Per patient pharmacy quoted her $500 out of pocket and patient would like to know of  can prescribe her an alternative to Semaglutide (RYBELSUS PO).     If so please send medication refill to:    [facility] [id number] [address] - P [phone] - F [phone]  [address] [place] [id number]  Phone: [phone]  Fax: [phone]     Please advise

## 2024-07-18 NOTE — TELEPHONE ENCOUNTER
Tell her I sent Ozempic to replace Rybelsus. If the price is high for Ozempic too, let me know and will see what samples we can provide while trying to come up with a long term solution.

## 2024-07-18 NOTE — TELEPHONE ENCOUNTER
Pt called back in for Dr. Herrera, looks like the Ozempic would cost the pt more than she would pay for the Rybelsus . Please advise pt on next steps on what she should do, did let her know per Dr. Herrera:    Tell her I sent Ozempic to replace Rybelsus. If the price is high for Ozempic too, let me know and will see what samples we can provide while trying to come up with a long term solution.     Pt didn't seem sure in taking the Ozempic.   Pt would like a callback again.  Pt callback# [phone]

## 2024-07-19 NOTE — TELEPHONE ENCOUNTER
Has the Ozempic gone through the prior authorization process? Why is her insurance company not covering it?

## 2024-07-26 ENCOUNTER — OFFICE VISIT (OUTPATIENT)
Dept: SURGERY | Age: 59
End: 2024-07-26

## 2024-07-26 DIAGNOSIS — I10 ESSENTIAL HYPERTENSION, BENIGN: ICD-10-CM

## 2024-07-26 DIAGNOSIS — Z90.49 S/P LAPAROSCOPIC CHOLECYSTECTOMY: Primary | ICD-10-CM

## 2024-07-26 PROCEDURE — 99024 POSTOP FOLLOW-UP VISIT: CPT | Performed by: SURGERY

## 2024-07-26 RX ORDER — METFORMIN HYDROCHLORIDE 500 MG/1
TABLET, EXTENDED RELEASE ORAL
Qty: 120 TABLET | Refills: 2 | Status: SHIPPED | OUTPATIENT
Start: 2024-07-26

## 2024-07-26 RX ORDER — ATENOLOL 50 MG/1
TABLET ORAL
Qty: 15 TABLET | Refills: 2 | Status: SHIPPED | OUTPATIENT
Start: 2024-07-26

## 2024-07-26 NOTE — TELEPHONE ENCOUNTER
Last appointment: 7/2/2024    Return in about 6 weeks (around 8/13/2024).  Next appointment: Visit date not found  Last refill:   Atenolol: 01/15/2024  Metformin: 03/26/2024  Sent [employer] message to schedule next appointment due in August.

## 2024-07-26 NOTE — PROGRESS NOTES
PATIENT NAME: Ramona Ragland     YOB: 1965     TODAY'S DATE: 2024    Reason for Visit:  Post-op check    Requesting Physician:  Julieta Herrera    HISTORY OF PRESENT ILLNESS:              The patient is a 58 y.o. female with a PMHx as delineated below who presents for follow up without complaints.     Chief Complaint   Patient presents with    Post-Op Check     post op Laparoscopic Cholecystectomy with Cholangiogram 24         REVIEW OF SYSTEMS:  CONSTITUTIONAL:  negative  HEENT:  negative  RESPIRATORY:  negative  CARDIOVASCULAR:  negative  GASTROINTESTINAL:  negative except for abdominal pain  GENITOURINARY:  negative  HEMATOLOGIC/LYMPHATIC:  negative  MUSCULOSKELETAL: negative  NEUROLOGICAL:  negative    PMH  Past Medical History:   Diagnosis Date    Allergic rhinitis     Alopecia     CKD (chronic kidney disease) stage 3, GFR 30-59 ml/min ([facility])     COVID-19 2020    Diabetes mellitus type 2, uncontrolled, with complications 2014    TYPE 2    Fibroids, intramural 2011    History of uterine fibroid     Hyperaldosteronism (HCC)     ?    Hyperlipidemia     Obesity     After I had my first child    ZA (obstructive sleep apnea) 2011    [facility] sleep eval     Osteoarthritis of right knee     secondary hypertension     Vitamin D deficiency 2012       PSH  Past Surgical History:   Procedure Laterality Date    BREAST BIOPSY Left     metal clamp     SECTION      x3    CHOLECYSTECTOMY, LAPAROSCOPIC N/A 2024    Laparoscopic Cholecystectomy with Cholangiogram performed by Amos Llanes MD at [facility] OR    COLONOSCOPY      fu 5 years    COLONOSCOPY N/A 2022    fu 5 yrs, COLONOSCOPY POLYPECTOMY SNARE/COLD BIOPSY performed by Tristan Nuno MD at [facility] ENDOSCOPY    TUBAL LIGATION  1990    BTL       Social History  Social History     Socioeconomic History    Marital status:      Spouse name: [place]    Number of children: 3    Years of education:

## 2024-07-29 ENCOUNTER — OFFICE VISIT (OUTPATIENT)
Dept: ENDOCRINOLOGY | Age: 59
End: 2024-07-29
Payer: COMMERCIAL

## 2024-07-29 DIAGNOSIS — E11.29 TYPE 2 DIABETES MELLITUS WITH MICROALBUMINURIA (HCC): Primary | ICD-10-CM

## 2024-07-29 DIAGNOSIS — R80.9 TYPE 2 DIABETES MELLITUS WITH MICROALBUMINURIA (HCC): Primary | ICD-10-CM

## 2024-07-29 PROCEDURE — 3046F HEMOGLOBIN A1C LEVEL >9.0%: CPT

## 2024-07-29 PROCEDURE — 97803 MED NUTRITION INDIV SUBSEQ: CPT

## 2024-07-29 NOTE — PROGRESS NOTES
Medical Nutrition Therapy for Diabetes  [facility] Endocrinology    Ramona Ragland  July 29, 2024    Patient Care Team:  Julieta Herrera MD as PCP - General (Internal Medicine)  Julieta Herrera MD as PCP - Empaneled Provider  Eduardo Alejandre DO as Consulting Physician (Pulmonology)  Ashley Jones RD, LD as Dietitian (Dietitian Registered)  Alpa Conklin MD as Obstetrician (Obstetrics & Gynecology)    Reason for visit: 1. Type 2 diabetes mellitus with microalbuminuria (HCC)     Follow up- saw boubacar anthony in 2022    ASSESSMENT/PLAN:   NUTRITION DIAGNOSIS    #1 Problem: Altered Nutrition-Related Laboratory Values (NC-2.2)  Related to: Endocrine/Diabetes   As Evidenced by: Elevated Plasma glucose and/or HgbA1c levels         #2 Problem: Inconsistent Carbohydrate Intake (NI 5.8.4)  Related to: Varied meal timing / incorrect carbohydrate counting  As Evidenced by: fluctuation in blood glucose levels / food recall    #3 Problem: Knowledge and Beliefs-NB-3.1                       Food and nutrition deficits    NUTRITION INTERVENTION  Nutrition Prescription: 45 grams carbohydrate per meal with protein and non-starch vegetables  15 gram carbohydrate snacks     Diabetes Education/Counseling included:  Carbohydrate control- Ada plate method   Label reading- DV%, total carb   Benefit of eating protein with each meal/snack reviewed  Reviewed sugar sweetened beverage intake  Monitoring- using glucometer.     Medication Review- insulin management:  Reviewed and had patient teach back appropriate procedure for preparing insulin pen (air shot) and dosing.  Provided rational and appropriate spots for injection sites, and need for 10 second pause before removing injection.     Benefits of adequate hydration, quality sleep and stress management  Explained HgbA1c ranges, reviewed normal/at risk for diabetes/and diabetes diagnosis ranges.  Types of Insulin including length of action

## 2024-08-01 NOTE — OP NOTE
[address] [address]                            OPERATIVE REPORT      PATIENT NAME: CHANTELLE CARRASCO              : 1965  MED REC NO: [id number]                      ROOM: OR  ACCOUNT NO: [id number]                       ADMIT DATE: 2024  PROVIDER: Amos Llanes MD      DATE OF PROCEDURE:  2024    SURGEON:  Amos Llanes MD    PREOPERATIVE DIAGNOSIS:  Symptomatic cholelithiasis.    POSTOPERATIVE DIAGNOSIS:  Symptomatic cholelithiasis.    PROCEDURE PERFORMED:  Laparoscopic cholecystectomy with intraoperative cholangiogram.    ANESTHESIA:  General endotracheal anesthesia.    ESTIMATED BLOOD LOSS:  Minimal.    COMPLICATIONS:  None.    SPECIMEN:  Gallbladder.    INDICATION FOR THE PROCEDURE:  The patient is a 58-year-old female, who presents with multiple episodes of postprandial right upper quadrant abdominal pain.  The patient underwent an ultrasound, which revealed evidence of cholelithiasis without common bile duct dilatation.  Based on the patient's symptoms and the ultrasound findings, a decision was made to proceed with a laparoscopic cholecystectomy.  The risks and benefits of the procedure were discussed with the patient.  Consent was obtained.    DETAILS OF THE OPERATION:  The patient was brought to the operating room and placed in supine position.  After adequate induction of general anesthetic, the patient was prepped and draped in a sterile fashion.  An incision above the umbilicus was made using an 11 blade scalpel.  This was carried down through the subcutaneous tissue to expose the anterior fascia.  The anterior fascia was then grasped and raised in the wound using 2 towel clips.  An incision in the fascia was then made.  Blunt dissection was then used to gain entrance into the abdominal cavity.  Following entrance into the abdominal cavity, a 10 mm Marybel trocar was then introduced.  The abdomen

## 2024-09-11 NOTE — TELEPHONE ENCOUNTER
Last appointment: 7/2/2024  Return in about 6 weeks (around 8/13/2024).  Next appointment: Visit date not found  Last refill: Discontinued by: Shanta Messina MA on 5/6/2024 10:39   Sent [employer] message to schedule due/overdue appointment.

## 2024-09-11 NOTE — TELEPHONE ENCOUNTER
Please find out from patient if she is still taking the Rybelsus.  It is unclear why it shows up as discontinued.  Please make an appointment with me also.

## 2024-09-12 RX ORDER — ORAL SEMAGLUTIDE 7 MG/1
TABLET ORAL
Qty: 30 TABLET | Refills: 3 | Status: SHIPPED | OUTPATIENT
Start: 2024-09-12

## 2024-09-12 NOTE — TELEPHONE ENCOUNTER
I called and spoke to patient and she is on Rybelsus and has not stopped taking it. She is unsure why someone would every remove it. She stated she does need a refill. I also went ahead and scheduled an appointment for the patient for 09/18/2024

## 2024-09-18 ENCOUNTER — OFFICE VISIT (OUTPATIENT)
Dept: INTERNAL MEDICINE CLINIC | Age: 59
End: 2024-09-18

## 2024-09-18 VITALS
BODY MASS INDEX: 40.79 KG/M2 | HEART RATE: 70 BPM | WEIGHT: 230.2 LBS | SYSTOLIC BLOOD PRESSURE: 132 MMHG | OXYGEN SATURATION: 97 % | DIASTOLIC BLOOD PRESSURE: 80 MMHG

## 2024-09-18 DIAGNOSIS — I10 ESSENTIAL HYPERTENSION, BENIGN: ICD-10-CM

## 2024-09-18 DIAGNOSIS — R77.8 ELEVATED TOTAL PROTEIN: ICD-10-CM

## 2024-09-18 DIAGNOSIS — E78.5 HYPERLIPIDEMIA, UNSPECIFIED HYPERLIPIDEMIA TYPE: ICD-10-CM

## 2024-09-18 DIAGNOSIS — E11.65 TYPE 2 DIABETES MELLITUS WITH HYPERGLYCEMIA, UNSPECIFIED WHETHER LONG TERM INSULIN USE (HCC): Primary | ICD-10-CM

## 2024-09-18 DIAGNOSIS — Z23 NEED FOR INFLUENZA VACCINATION: ICD-10-CM

## 2024-10-05 DIAGNOSIS — I10 ESSENTIAL HYPERTENSION, BENIGN: ICD-10-CM

## 2024-10-07 RX ORDER — SPIRONOLACTONE 100 MG/1
TABLET, FILM COATED ORAL
Qty: 30 TABLET | Refills: 5 | Status: SHIPPED | OUTPATIENT
Start: 2024-10-07

## 2024-10-07 NOTE — TELEPHONE ENCOUNTER
Medication:   Requested Prescriptions     Pending Prescriptions Disp Refills    spironolactone (ALDACTONE) 100 MG tablet [Pharmacy Med Name: SPIRONOLACTONE 100 MG TABLET] 30 tablet 5     Sig: TAKE 1 TABLET BY MOUTH DAILY        Last Filled:  9/1/2024     Patient Phone Number: [phone] (home)     Last appt: 9/18/2024   Next appt: 12/26/2024    Last OARRS:        No data to display

## 2024-10-14 DIAGNOSIS — E78.5 HYPERLIPIDEMIA, UNSPECIFIED HYPERLIPIDEMIA TYPE: ICD-10-CM

## 2024-10-14 DIAGNOSIS — R77.8 ELEVATED TOTAL PROTEIN: ICD-10-CM

## 2024-10-14 DIAGNOSIS — E11.65 TYPE 2 DIABETES MELLITUS WITH HYPERGLYCEMIA, UNSPECIFIED WHETHER LONG TERM INSULIN USE (HCC): ICD-10-CM

## 2024-10-14 LAB
ALBUMIN SERPL-MCNC: 4.1 G/DL (ref 3.4–5)
ALP SERPL-CCNC: 73 U/L (ref 40–129)
ALT SERPL-CCNC: 13 U/L (ref 10–40)
ANION GAP SERPL CALCULATED.3IONS-SCNC: 10 MMOL/L (ref 3–16)
AST SERPL-CCNC: 16 U/L (ref 15–37)
BILIRUB DIRECT SERPL-MCNC: <0.1 MG/DL (ref 0–0.3)
BILIRUB INDIRECT SERPL-MCNC: 0.2 MG/DL (ref 0–1)
BILIRUB SERPL-MCNC: 0.3 MG/DL (ref 0–1)
BUN SERPL-MCNC: 17 MG/DL (ref 7–20)
CALCIUM SERPL-MCNC: 9.3 MG/DL (ref 8.3–10.6)
CHLORIDE SERPL-SCNC: 100 MMOL/L (ref 99–110)
CHOLEST SERPL-MCNC: 109 MG/DL (ref 0–199)
CO2 SERPL-SCNC: 27 MMOL/L (ref 21–32)
CREAT SERPL-MCNC: 1 MG/DL (ref 0.6–1.1)
GFR SERPLBLD CREATININE-BSD FMLA CKD-EPI: 65 ML/MIN/{1.73_M2}
GLUCOSE SERPL-MCNC: 115 MG/DL (ref 70–99)
HDLC SERPL-MCNC: 42 MG/DL (ref 40–60)
LDLC SERPL CALC-MCNC: 52 MG/DL
POTASSIUM SERPL-SCNC: 4.3 MMOL/L (ref 3.5–5.1)
PROT SERPL-MCNC: 7.4 G/DL (ref 6.4–8.2)
SODIUM SERPL-SCNC: 137 MMOL/L (ref 136–145)
TRIGL SERPL-MCNC: 74 MG/DL (ref 0–150)
VLDLC SERPL CALC-MCNC: 15 MG/DL

## 2024-10-15 LAB
EST. AVERAGE GLUCOSE BLD GHB EST-MCNC: 154.2 MG/DL
HBA1C MFR BLD: 7 %

## 2024-10-16 LAB
ALBUMIN SERPL ELPH-MCNC: 3.3 G/DL (ref 3.1–4.9)
ALPHA1 GLOB SERPL ELPH-MCNC: 0.3 G/DL (ref 0.2–0.4)
ALPHA2 GLOB SERPL ELPH-MCNC: 0.9 G/DL (ref 0.4–1.1)
B-GLOBULIN SERPL ELPH-MCNC: 1.1 G/DL (ref 0.9–1.6)
GAMMA GLOB SERPL ELPH-MCNC: 1.9 G/DL (ref 0.6–1.8)
SPE/IFE INTERPRETATION: NORMAL

## 2024-10-31 ENCOUNTER — OFFICE VISIT (OUTPATIENT)
Dept: ENDOCRINOLOGY | Age: 59
End: 2024-10-31

## 2024-10-31 DIAGNOSIS — R80.9 TYPE 2 DIABETES MELLITUS WITH MICROALBUMINURIA (HCC): Primary | ICD-10-CM

## 2024-10-31 DIAGNOSIS — E11.29 TYPE 2 DIABETES MELLITUS WITH MICROALBUMINURIA (HCC): Primary | ICD-10-CM

## 2024-10-31 NOTE — PROGRESS NOTES
Medical Nutrition Therapy for Diabetes  [facility]    Ramona Ragland  November 1, 2024    Patient Care Team:  Julieta Herrera MD as PCP - General (Internal Medicine)  Julieta Herrera MD as PCP - Empaneled Provider  Eduardo Alejandre DO as Consulting Physician (Pulmonology)  Ashley Jones RD, LD as Dietitian (Dietitian Registered)  Alpa Conklin MD as Obstetrician (Obstetrics & Gynecology)    Reason for visit: 1. Type 2 diabetes mellitus with microalbuminuria (HCC)       Follow up- saw boubacar cruz in 2022    ASSESSMENT/PLAN:   NUTRITION DIAGNOSIS    #1 Problem: Altered Nutrition-Related Laboratory Values (NC-2.2)  Related to: Endocrine/Diabetes   As Evidenced by: Elevated Plasma glucose and/or HgbA1c levels         #2 Problem: Inconsistent Carbohydrate Intake (NI 5.8.4)  Related to: Varied meal timing / incorrect carbohydrate counting  As Evidenced by: fluctuation in blood glucose levels / food recall    #3 Problem: Knowledge and Beliefs-NB-3.1                       Food and nutrition deficits    NUTRITION INTERVENTION  Nutrition Prescription: 45 grams carbohydrate per meal with protein and non-starch vegetables  15 gram carbohydrate snacks     Diabetes Education/Counseling included:  Congratulations on reduction of A1c!  Reports after cholecystectomy BG numbers reduces.   Carbohydrate control- Ada plate method   Benefit of eating protein with each meal/snack reviewed  Reviewed sugar sweetened beverage intake  Monitoring- using glucometer. Reviewed     Benefits of adequate hydration, quality sleep and stress management  Explained HgbA1c ranges, reviewed normal/at risk for diabetes/and diabetes diagnosis ranges.  Impacts of fiber, fats, and proteins on blood glucose trending     Handouts Provided:  Checking Your Blood Glucose- [employer] [facility] and Education Handouts- [facility]  Planning Healthy Meals- [employer]  Sample Menu- [employer]

## 2024-11-06 DIAGNOSIS — I10 ESSENTIAL HYPERTENSION, BENIGN: ICD-10-CM

## 2024-11-06 DIAGNOSIS — E78.5 HYPERLIPIDEMIA, UNSPECIFIED HYPERLIPIDEMIA TYPE: ICD-10-CM

## 2024-11-06 RX ORDER — AMLODIPINE BESYLATE 5 MG/1
TABLET ORAL
Qty: 30 TABLET | Refills: 5 | Status: SHIPPED | OUTPATIENT
Start: 2024-11-06

## 2024-11-06 RX ORDER — ATORVASTATIN CALCIUM 40 MG/1
TABLET, FILM COATED ORAL
Qty: 30 TABLET | Refills: 5 | Status: SHIPPED | OUTPATIENT
Start: 2024-11-06

## 2024-11-06 RX ORDER — ATENOLOL 50 MG/1
TABLET ORAL
Qty: 15 TABLET | Refills: 2 | Status: SHIPPED | OUTPATIENT
Start: 2024-11-06

## 2024-11-06 RX ORDER — METFORMIN HYDROCHLORIDE 500 MG/1
TABLET, EXTENDED RELEASE ORAL
Qty: 120 TABLET | Refills: 2 | Status: SHIPPED | OUTPATIENT
Start: 2024-11-06

## 2024-11-06 NOTE — TELEPHONE ENCOUNTER
Last appointment: 9/18/2024  Next appointment: 12/26/2024  Last refill:   Requested Prescriptions     Pending Prescriptions Disp Refills    atenolol (TENORMIN) 50 MG tablet [Pharmacy Med Name: ATENOLOL 50 MG TABLET] 15 tablet 2     Sig: TAKE 1/2 TABLET BY MOUTH DAILY    metFORMIN (GLUCOPHAGE-XR) 500 MG extended release tablet [Pharmacy Med Name: METFORMIN HCL  MG TABLET] 120 tablet 2     Sig: TAKE FOUR TABLETS BY MOUTH DAILY WITH BREAKFAST    atorvastatin (LIPITOR) 40 MG tablet [Pharmacy Med Name: ATORVASTATIN 40 MG TABLET] 30 tablet 5     Sig: TAKE 1 TABLET BY MOUTH DAILY    amLODIPine (NORVASC) 5 MG tablet [Pharmacy Med Name: amLODIPine BESYLATE 5 MG TAB] 30 tablet 5     Sig: TAKE 1 TABLET BY MOUTH DAILY

## 2024-12-21 NOTE — PROGRESS NOTES
Ramona Ragland (:  1965) is a 59 y.o. female, here for evaluation of the following chief complaint(s):    3 Month Follow-Up      ASSESSMENT/PLAN:  1. Type 2 diabetes mellitus with hyperglycemia, unspecified whether long term insulin use (HCC)  -     POCT glycosylated hemoglobin (Hb A1C) - 6.8  -     TSH; Future (check due to wt gain)  Increase to Rybelsus 14 with goal A1c of 6.5 and continue Metformin.  Hyperlipidemia, unspecified hyperlipidemia type  Stable on atorvastatin  Essential hypertension, benign  Reasonably controlled on amlodipine and atenolol and spironolactone.  Not taking ACE or ARB as per nephrology.  Possible history of hyperaldosteronism.  Return in about 6 months (around 2025).    SUBJECTIVE/OBJECTIVE:  HPI  Patient is here for routine visit.  Overall she feels well.  [birth year] her [birth year]! Her weight is up a few pounds due to dietary indiscretions.     Review of Systems    Past Medical History:   Diagnosis Date    Allergic rhinitis     Alopecia     CKD (chronic kidney disease) stage 3, GFR 30-59 ml/min (HCC)     COVID-19 2020    Diabetes mellitus type 2, uncontrolled, with complications 2014    TYPE 2    Fibroids, intramural 2011    History of uterine fibroid     Hyperaldosteronism (HCC)     ?    Hyperlipidemia     Obesity     After I had my first child    ZA (obstructive sleep apnea) 2011    [facility] sleep eval     Osteoarthritis of right knee     secondary hypertension     Vitamin D deficiency 2012       Current Outpatient Medications   Medication Sig Dispense Refill    Semaglutide (RYBELSUS) 14 MG TABS Take 14 mg by mouth daily 90 tablet 0    atenolol (TENORMIN) 50 MG tablet TAKE 1/2 TABLET BY MOUTH DAILY 15 tablet 2    metFORMIN (GLUCOPHAGE-XR) 500 MG extended release tablet TAKE FOUR TABLETS BY MOUTH DAILY WITH BREAKFAST 120 tablet 2    atorvastatin (LIPITOR) 40 MG tablet TAKE 1 TABLET BY MOUTH DAILY 30 tablet 5    amLODIPine (NORVASC) 5 MG tablet

## 2024-12-26 ENCOUNTER — OFFICE VISIT (OUTPATIENT)
Dept: INTERNAL MEDICINE CLINIC | Age: 59
End: 2024-12-26

## 2024-12-26 VITALS
WEIGHT: 239 LBS | OXYGEN SATURATION: 95 % | SYSTOLIC BLOOD PRESSURE: 120 MMHG | DIASTOLIC BLOOD PRESSURE: 76 MMHG | HEART RATE: 65 BPM | BODY MASS INDEX: 42.35 KG/M2

## 2024-12-26 DIAGNOSIS — I10 ESSENTIAL HYPERTENSION, BENIGN: ICD-10-CM

## 2024-12-26 DIAGNOSIS — E78.5 HYPERLIPIDEMIA, UNSPECIFIED HYPERLIPIDEMIA TYPE: ICD-10-CM

## 2024-12-26 DIAGNOSIS — E11.65 TYPE 2 DIABETES MELLITUS WITH HYPERGLYCEMIA, UNSPECIFIED WHETHER LONG TERM INSULIN USE (HCC): Primary | ICD-10-CM

## 2024-12-26 LAB — HBA1C MFR BLD: 6.8 %

## 2024-12-26 RX ORDER — ORAL SEMAGLUTIDE 14 MG/1
14 TABLET ORAL DAILY
Qty: 90 TABLET | Refills: 0 | Status: SHIPPED | OUTPATIENT
Start: 2024-12-26 | End: 2025-03-26

## 2025-01-28 ENCOUNTER — OFFICE VISIT (OUTPATIENT)
Dept: GYNECOLOGY | Age: 60
End: 2025-01-28
Payer: COMMERCIAL

## 2025-01-28 VITALS
BODY MASS INDEX: 44.01 KG/M2 | RESPIRATION RATE: 17 BRPM | WEIGHT: 248.4 LBS | OXYGEN SATURATION: 96 % | DIASTOLIC BLOOD PRESSURE: 72 MMHG | SYSTOLIC BLOOD PRESSURE: 118 MMHG | HEART RATE: 77 BPM | HEIGHT: 63 IN

## 2025-01-28 DIAGNOSIS — Z78.0 MENOPAUSE: ICD-10-CM

## 2025-01-28 DIAGNOSIS — Z01.419 WELL WOMAN EXAM WITH ROUTINE GYNECOLOGICAL EXAM: Primary | ICD-10-CM

## 2025-01-28 PROCEDURE — 99396 PREV VISIT EST AGE 40-64: CPT | Performed by: OBSTETRICS & GYNECOLOGY

## 2025-01-28 PROCEDURE — 3074F SYST BP LT 130 MM HG: CPT | Performed by: OBSTETRICS & GYNECOLOGY

## 2025-01-28 PROCEDURE — 3078F DIAST BP <80 MM HG: CPT | Performed by: OBSTETRICS & GYNECOLOGY

## 2025-01-29 ASSESSMENT — ENCOUNTER SYMPTOMS
ALLERGIC/IMMUNOLOGIC NEGATIVE: 1
RESPIRATORY NEGATIVE: 1
EYES NEGATIVE: 1
GASTROINTESTINAL NEGATIVE: 1

## 2025-01-30 NOTE — PROGRESS NOTES
Subjective   Patient ID: Ramona Ragland is a 59 y.o. female.    Patient is here for annual. Patient in menopause.    Gynecologic Exam        Review of Systems   Constitutional: Negative.    HENT: Negative.     Eyes: Negative.    Respiratory: Negative.     Cardiovascular: Negative.    Gastrointestinal: Negative.    Endocrine: Negative.    Genitourinary: Negative.    Musculoskeletal: Negative.    Skin: Negative.    Allergic/Immunologic: Negative.    Neurological: Negative.    Hematological: Negative.    Psychiatric/Behavioral: Negative.       Date of Birth 1965  Past Medical History:   Diagnosis Date    Allergic rhinitis     Alopecia     CKD (chronic kidney disease) stage 3, GFR 30-59 ml/min (HCC)     COVID-19 2020    Diabetes mellitus type 2, uncontrolled, with complications 2014    TYPE 2    Fibroids, intramural 2011    History of uterine fibroid     Hyperaldosteronism (HCC)     ?    Hyperlipidemia     Obesity     After I had my first child    ZA (obstructive sleep apnea) 2011    [facility] sleep eval     Osteoarthritis of right knee     secondary hypertension     Vitamin D deficiency 2012     Past Surgical History:   Procedure Laterality Date    BREAST BIOPSY Left     metal clamp     SECTION      x3    CHOLECYSTECTOMY, LAPAROSCOPIC N/A 2024    Laparoscopic Cholecystectomy with Cholangiogram performed by Amos Llanes MD at [facility] OR    COLONOSCOPY      fu 5 years    COLONOSCOPY N/A 2022    fu 5 yrs, COLONOSCOPY POLYPECTOMY SNARE/COLD BIOPSY performed by Tristan Nuno MD at [facility] ENDOSCOPY    TUBAL LIGATION  1990    BTL     OB History    Para Term  AB Living   3 3 3     3   SAB IAB Ectopic Molar Multiple Live Births             3      # Outcome Date GA Lbr Alexey/2nd Weight Sex Type Anes PTL Lv   3 Term  40w0d   M CS-Unspec   ROSELIA   2 Term  40w0d   F CS-Unspec   ROSELIA   1 Term 1986 40w0d   M CS-Unspec   ROSELIA     Social History

## 2025-02-04 DIAGNOSIS — E11.65 TYPE 2 DIABETES MELLITUS WITH HYPERGLYCEMIA, UNSPECIFIED WHETHER LONG TERM INSULIN USE (HCC): ICD-10-CM

## 2025-02-04 DIAGNOSIS — I10 ESSENTIAL HYPERTENSION, BENIGN: ICD-10-CM

## 2025-02-06 RX ORDER — ORAL SEMAGLUTIDE 14 MG/1
TABLET ORAL
Qty: 90 TABLET | Refills: 0 | Status: SHIPPED | OUTPATIENT
Start: 2025-02-06

## 2025-02-06 RX ORDER — METFORMIN HYDROCHLORIDE 500 MG/1
TABLET, EXTENDED RELEASE ORAL
Qty: 120 TABLET | Refills: 2 | Status: SHIPPED | OUTPATIENT
Start: 2025-02-06

## 2025-02-06 RX ORDER — ATENOLOL 50 MG/1
TABLET ORAL
Qty: 15 TABLET | Refills: 2 | Status: SHIPPED | OUTPATIENT
Start: 2025-02-06

## 2025-02-06 NOTE — TELEPHONE ENCOUNTER
Last appointment: 12/26/2024  Next appointment: 6/26/2025  Last refill:     Atenolol: 11/06/2024  Metformin: 11/06/2024  Rybelsus: 12/26/2024

## 2025-03-12 ENCOUNTER — OFFICE VISIT (OUTPATIENT)
Dept: INTERNAL MEDICINE CLINIC | Age: 60
End: 2025-03-12
Payer: COMMERCIAL

## 2025-03-12 ENCOUNTER — HOSPITAL ENCOUNTER (OUTPATIENT)
Dept: GENERAL RADIOLOGY | Age: 60
Discharge: HOME | End: 2025-03-12
Payer: COMMERCIAL

## 2025-03-12 VITALS
SYSTOLIC BLOOD PRESSURE: 124 MMHG | WEIGHT: 247.4 LBS | BODY MASS INDEX: 43.82 KG/M2 | OXYGEN SATURATION: 96 % | DIASTOLIC BLOOD PRESSURE: 80 MMHG | TEMPERATURE: 97.7 F | HEART RATE: 66 BPM

## 2025-03-12 DIAGNOSIS — E11.65 TYPE 2 DIABETES MELLITUS WITH HYPERGLYCEMIA, UNSPECIFIED WHETHER LONG TERM INSULIN USE (HCC): ICD-10-CM

## 2025-03-12 DIAGNOSIS — M25.551 RIGHT HIP PAIN: ICD-10-CM

## 2025-03-12 DIAGNOSIS — M25.551 RIGHT HIP PAIN: Primary | ICD-10-CM

## 2025-03-12 LAB
ALBUMIN SERPL-MCNC: 4.4 G/DL (ref 3.4–5)
ALBUMIN/GLOB SERPL: 1.3 {RATIO} (ref 1.1–2.2)
ALP SERPL-CCNC: 82 U/L (ref 40–129)
ALT SERPL-CCNC: 16 U/L (ref 10–40)
ANION GAP SERPL CALCULATED.3IONS-SCNC: 10 MMOL/L (ref 3–16)
AST SERPL-CCNC: 17 U/L (ref 15–37)
BILIRUB SERPL-MCNC: 0.3 MG/DL (ref 0–1)
BUN SERPL-MCNC: 19 MG/DL (ref 7–20)
CALCIUM SERPL-MCNC: 10 MG/DL (ref 8.3–10.6)
CHLORIDE SERPL-SCNC: 100 MMOL/L (ref 99–110)
CO2 SERPL-SCNC: 27 MMOL/L (ref 21–32)
CREAT SERPL-MCNC: 1.1 MG/DL (ref 0.6–1.1)
CRP SERPL-MCNC: 5 MG/L (ref 0–5.1)
EST. AVERAGE GLUCOSE BLD GHB EST-MCNC: 159.9 MG/DL
GFR SERPLBLD CREATININE-BSD FMLA CKD-EPI: 58 ML/MIN/{1.73_M2}
GLUCOSE SERPL-MCNC: 110 MG/DL (ref 70–99)
HBA1C MFR BLD: 7.2 %
POTASSIUM SERPL-SCNC: 4.6 MMOL/L (ref 3.5–5.1)
PROT SERPL-MCNC: 7.9 G/DL (ref 6.4–8.2)
SODIUM SERPL-SCNC: 137 MMOL/L (ref 136–145)
TSH SERPL DL<=0.005 MIU/L-ACNC: 1.73 UIU/ML (ref 0.27–4.2)

## 2025-03-12 PROCEDURE — 99214 OFFICE O/P EST MOD 30 MIN: CPT | Performed by: INTERNAL MEDICINE

## 2025-03-12 PROCEDURE — 3079F DIAST BP 80-89 MM HG: CPT | Performed by: INTERNAL MEDICINE

## 2025-03-12 PROCEDURE — 3074F SYST BP LT 130 MM HG: CPT | Performed by: INTERNAL MEDICINE

## 2025-03-12 PROCEDURE — 3051F HG A1C>EQUAL 7.0%<8.0%: CPT | Performed by: INTERNAL MEDICINE

## 2025-03-12 PROCEDURE — 73502 X-RAY EXAM HIP UNI 2-3 VIEWS: CPT

## 2025-03-12 RX ORDER — GABAPENTIN 300 MG/1
300 CAPSULE ORAL 2 TIMES DAILY
Qty: 60 CAPSULE | Refills: 0 | Status: SHIPPED | OUTPATIENT
Start: 2025-03-12 | End: 2025-04-11

## 2025-03-12 RX ORDER — MELOXICAM 15 MG/1
15 TABLET ORAL DAILY
Qty: 10 TABLET | Refills: 0 | Status: SHIPPED | OUTPATIENT
Start: 2025-03-12 | End: 2025-03-22

## 2025-03-12 SDOH — ECONOMIC STABILITY: FOOD INSECURITY: WITHIN THE PAST 12 MONTHS, YOU WORRIED THAT YOUR FOOD WOULD RUN OUT BEFORE YOU GOT MONEY TO BUY MORE.: NEVER TRUE

## 2025-03-12 SDOH — ECONOMIC STABILITY: FOOD INSECURITY: WITHIN THE PAST 12 MONTHS, THE FOOD YOU BOUGHT JUST DIDN'T LAST AND YOU DIDN'T HAVE MONEY TO GET MORE.: NEVER TRUE

## 2025-03-12 ASSESSMENT — PATIENT HEALTH QUESTIONNAIRE - PHQ9
SUM OF ALL RESPONSES TO PHQ QUESTIONS 1-9: 0
SUM OF ALL RESPONSES TO PHQ QUESTIONS 1-9: 0
2. FEELING DOWN, DEPRESSED OR HOPELESS: NOT AT ALL
1. LITTLE INTEREST OR PLEASURE IN DOING THINGS: NOT AT ALL
SUM OF ALL RESPONSES TO PHQ QUESTIONS 1-9: 0
SUM OF ALL RESPONSES TO PHQ QUESTIONS 1-9: 0

## 2025-03-12 NOTE — PROGRESS NOTES
Ramona Ragland (:  1965) is a 59 y.o. female, here for evaluation of the following chief complaint(s):    Leg Pain (Right leg pain from foot to hip. Started yesterday.)      ASSESSMENT/PLAN:  1. Right hip pain  Unclear etiology.  Possible viral synovitis.  Prescribed meloxicam and gabapentin for symptoms.  Advised using a cane for safety.  -     Sedimentation Rate; Future  -     CBC with Auto Differential; Future  -     C-Reactive Protein; Future  -     XR HIP 2-3 VW W PELVIS RIGHT; Future  2. Type 2 diabetes mellitus with hyperglycemia, unspecified whether long term insulin use (HCC)  Stable on Rybelsus and metformin  -     Comprehensive Metabolic Panel; Future  -     Hemoglobin A1C; Future    Keep 25 appt      SUBJECTIVE/OBJECTIVE:  HPI  Patient reports right-sided hip pain down the front of her leg that she describes as throbbing.  It is causing her to limp.  A leg massager did not help.  It affected her sleep.    During the same time she has had a sharp pains of her right forearm including burning between the elbow and wrist.  Her right shoulder feels sore.  She denies recent immunizations.  She denies neck pain or low back pain.  She denies headaches.    Review of Systems    Past Medical History:   Diagnosis Date    Allergic rhinitis     Alopecia     CKD (chronic kidney disease) stage 3, GFR 30-59 ml/min ([facility])     COVID-19 2020    Diabetes mellitus type 2, uncontrolled, with complications 2014    TYPE 2    Fibroids, intramural 2011    History of uterine fibroid     Hyperaldosteronism     ?    Hyperlipidemia     Obesity     After I had my first child    ZA (obstructive sleep apnea) 2011    [facility] sleep eval     Osteoarthritis of right knee     secondary hypertension     Vitamin D deficiency 2012       Current Outpatient Medications   Medication Sig Dispense Refill    meloxicam (MOBIC) 15 MG tablet Take 1 tablet by mouth daily for 10 days 10 tablet 0    gabapentin

## 2025-03-13 LAB
BASOPHILS # BLD: 0 K/UL (ref 0–0.2)
BASOPHILS NFR BLD: 0.6 %
DEPRECATED RDW RBC AUTO: 15.5 % (ref 12.4–15.4)
EOSINOPHIL # BLD: 0.1 K/UL (ref 0–0.6)
EOSINOPHIL NFR BLD: 1.1 %
ERYTHROCYTE [SEDIMENTATION RATE] IN BLOOD BY WESTERGREN METHOD: 63 MM/HR (ref 0–30)
HCT VFR BLD AUTO: 34.8 % (ref 36–48)
HGB BLD-MCNC: 11.7 G/DL (ref 12–16)
LYMPHOCYTES # BLD: 2.3 K/UL (ref 1–5.1)
LYMPHOCYTES NFR BLD: 39.7 %
MCH RBC QN AUTO: 28.1 PG (ref 26–34)
MCHC RBC AUTO-ENTMCNC: 33.5 G/DL (ref 31–36)
MCV RBC AUTO: 83.8 FL (ref 80–100)
MONOCYTES # BLD: 0.3 K/UL (ref 0–1.3)
MONOCYTES NFR BLD: 5.1 %
NEUTROPHILS # BLD: 3.2 K/UL (ref 1.7–7.7)
NEUTROPHILS NFR BLD: 53.5 %
PLATELET # BLD AUTO: 258 K/UL (ref 135–450)
PMV BLD AUTO: 9 FL (ref 5–10.5)
RBC # BLD AUTO: 4.15 M/UL (ref 4–5.2)
WBC # BLD AUTO: 5.9 K/UL (ref 4–11)

## 2025-03-15 ENCOUNTER — RESULTS FOLLOW-UP (OUTPATIENT)
Dept: INTERNAL MEDICINE CLINIC | Age: 60
End: 2025-03-15

## 2025-03-15 ENCOUNTER — RESULTS FOLLOW-UP (OUTPATIENT)
Dept: GENERAL RADIOLOGY | Age: 60
End: 2025-03-15

## 2025-03-17 ENCOUNTER — TELEPHONE (OUTPATIENT)
Dept: INTERNAL MEDICINE CLINIC | Age: 60
End: 2025-03-17

## 2025-03-17 NOTE — TELEPHONE ENCOUNTER
----- Message from Dr. Julieta Herrera MD sent at 3/16/2025 10:48 PM EDT -----  Please call patient to see if her symptoms are any better.  Let me know

## 2025-03-24 ENCOUNTER — TELEPHONE (OUTPATIENT)
Dept: INTERNAL MEDICINE CLINIC | Age: 60
End: 2025-03-24

## 2025-03-24 NOTE — TELEPHONE ENCOUNTER
Per Dr. Herrera, Please call patient to see if her symptoms are any better.  Let me know     Spoke with patient. She states her pain subsided after a couple of days. States she is fine

## 2025-04-17 DIAGNOSIS — I10 ESSENTIAL HYPERTENSION, BENIGN: ICD-10-CM

## 2025-04-17 RX ORDER — SPIRONOLACTONE 100 MG/1
100 TABLET, FILM COATED ORAL DAILY
Qty: 90 TABLET | Refills: 1 | Status: SHIPPED | OUTPATIENT
Start: 2025-04-17

## 2025-05-15 ENCOUNTER — TELEPHONE (OUTPATIENT)
Dept: INTERNAL MEDICINE CLINIC | Age: 60
End: 2025-05-15

## 2025-05-15 NOTE — TELEPHONE ENCOUNTER
----- Message from Keo ORANTES sent at 5/15/2025 11:49 AM EDT -----  Regarding: [facility] Appointment Request  E[facility] Appointment Request    Patient needs appointment for E[facility] Appointment Type: Annual Visit.    Patient Requested Dates(s): May 29 2025  Patient Requested Time: 2:30  Provider Name: Julieta Herrera MD    Wants to have a physical together with her appointment on May 29 2025    Reason for Appointment Request: Other Wants to have a physical together with her appointment on May 29 2025  --------------------------------------------------------------------------------------------------------------------------    Relationship to Patient: Self     Call Back Information: OK to leave message on [employer]  Preferred Call Back Number: Phone [phone] (home)

## 2025-05-15 NOTE — TELEPHONE ENCOUNTER
Patient has 6 mo f/u apt scheduled with you on May 29.    Requests for this to be a physical.   Please advise if ok.

## 2025-05-19 ENCOUNTER — TELEPHONE (OUTPATIENT)
Dept: INTERNAL MEDICINE CLINIC | Age: 60
End: 2025-05-19

## 2025-05-19 DIAGNOSIS — I10 ESSENTIAL HYPERTENSION, BENIGN: ICD-10-CM

## 2025-05-19 DIAGNOSIS — E78.5 HYPERLIPIDEMIA, UNSPECIFIED HYPERLIPIDEMIA TYPE: ICD-10-CM

## 2025-05-19 DIAGNOSIS — E11.65 TYPE 2 DIABETES MELLITUS WITH HYPERGLYCEMIA, UNSPECIFIED WHETHER LONG TERM INSULIN USE (HCC): ICD-10-CM

## 2025-05-19 RX ORDER — ORAL SEMAGLUTIDE 14 MG/1
14 TABLET ORAL DAILY
Qty: 90 TABLET | Refills: 0 | Status: SHIPPED | OUTPATIENT
Start: 2025-05-19

## 2025-05-19 RX ORDER — AMLODIPINE BESYLATE 5 MG/1
5 TABLET ORAL DAILY
Qty: 30 TABLET | Refills: 5 | Status: SHIPPED | OUTPATIENT
Start: 2025-05-19

## 2025-05-19 RX ORDER — ATENOLOL 50 MG/1
25 TABLET ORAL DAILY
Qty: 15 TABLET | Refills: 5 | Status: SHIPPED | OUTPATIENT
Start: 2025-05-19

## 2025-05-19 RX ORDER — METFORMIN HYDROCHLORIDE 500 MG/1
TABLET, EXTENDED RELEASE ORAL
Qty: 120 TABLET | Refills: 1 | Status: SHIPPED | OUTPATIENT
Start: 2025-05-19

## 2025-05-19 RX ORDER — ATORVASTATIN CALCIUM 40 MG/1
40 TABLET, FILM COATED ORAL DAILY
Qty: 30 TABLET | Refills: 5 | Status: SHIPPED | OUTPATIENT
Start: 2025-05-19

## 2025-05-19 NOTE — TELEPHONE ENCOUNTER
Submitted PA for Rybelsus 14MG tablets  [id number] STATUS: PENDING.    Follow up done daily; if no decision with in three days we will refax.  If another three days goes by with no decision will call the insurance for status.

## 2025-05-19 NOTE — TELEPHONE ENCOUNTER
Last appointment: 3/12/2025  Next appointment:     Sent [employer] message to schedule due/overdue appointment.     Last refill:

## 2025-05-20 NOTE — TELEPHONE ENCOUNTER
The medication Rybelsus 14mg tablets  is APPROVAL is extended from 05/27/2025 to 05/27/2026.    Please notify the patient.    If this requires a response please respond to the pool ( P [facility] MEDICATION PRE-AUTH).

## 2025-05-20 NOTE — TELEPHONE ENCOUNTER
The medication Rybelsus 14mg is APPROVED from 05/19/2025 to 05/26/2026. 2 weeks only.    I answered questions on the approval and faxed back to hopefully get the dates extended.     Please notify the patient.    If this requires a response please respond to the pool ( P [facility] MEDICATION PRE-AUTH).

## 2025-06-13 ENCOUNTER — TELEPHONE (OUTPATIENT)
Dept: INTERNAL MEDICINE CLINIC | Age: 60
End: 2025-06-13

## 2025-06-13 NOTE — TELEPHONE ENCOUNTER
----- Message from Caryn AUSTIN sent at 6/13/2025  1:31 PM EDT -----  Regarding: [facility] Appointment Request  E[facility] Appointment Request    Patient needs appointment for E[facility] Appointment Type: New to Provider.    Patient Requested Dates(s):after Monday next week   Patient Requested Time:anytime   Provider Name:Tariq Pichardo DO    Reason for Appointment Request: New to Provider- No appointments available during search  --------------------------------------------------------------------------------------------------------------------------    Relationship to Patient: Self     Call Back Information: OK to leave message on voicemail  Preferred Call Back Number: Phone [phone]   [Negative] : Heme/Lymph

## 2025-06-16 NOTE — TELEPHONE ENCOUNTER
Patient will callback and schedule with Dr. Luna after checking with her insurance to see if he is in network.

## (undated) DEVICE — FORCEPS BX L240CM JAW DIA2.8MM L CAP W/ NDL MIC MESH TOOTH

## (undated) DEVICE — TRAP FLUID

## (undated) DEVICE — GLOVE SURG SZ 7 L12IN FNGR THK75MIL WHT LTX POLYMER BEAD

## (undated) DEVICE — SOLUTION INJ LR VISIV 1000ML BG

## (undated) DEVICE — LARGE BORE STOPCOCK WITH ROTATING MALE LUER LOCK

## (undated) DEVICE — BLADE ES ELASTOMERIC COAT INSUL DURABLE BEND UPTO 90DEG

## (undated) DEVICE — NEEDLE,22GX1.5",REG,BEVEL: Brand: MEDLINE

## (undated) DEVICE — TROCAR: Brand: KII SHIELDED BLADED ACCESS SYSTEM

## (undated) DEVICE — SCISSORS ENDOSCP DIA5MM CRV MPLR CAUT W/ RATCH HNDL

## (undated) DEVICE — TISSUE RETRIEVAL SYSTEM: Brand: INZII RETRIEVAL SYSTEM

## (undated) DEVICE — MEDIA CONTRAST INJ OMNIPAQUE 300 50ML

## (undated) DEVICE — LIQUIBAND RAPID ADHESIVE 36/CS 0.8ML: Brand: MEDLINE

## (undated) DEVICE — CORD ES L10FT MPLR LAP

## (undated) DEVICE — SUTURE MONOCRYL + SZ 4-0 L18IN ABSRB UD L19MM PS-2 3/8 CIR MCP496G

## (undated) DEVICE — COVER,LIGHT HANDLE,FLX,1/PK: Brand: MEDLINE INDUSTRIES, INC.

## (undated) DEVICE — C-ARM: Brand: UNBRANDED

## (undated) DEVICE — 40583 XL ADVANCED TRENDELENBURG POSITIONING KIT: Brand: 40583 XL ADVANCED TRENDELENBURG POSITIONING KIT

## (undated) DEVICE — TOWEL,STOP FLAG GOLD N-W: Brand: MEDLINE

## (undated) DEVICE — SUTURE VICRYL + SZ 0 L27IN ABSRB WHT CT-2 1/2 CIR TAPERPOINT VCP270H

## (undated) DEVICE — GARMENT,MEDLINE,DVT,INT,CALF,MED, GEN2: Brand: MEDLINE

## (undated) DEVICE — TROCARS: Brand: KII® BALLOON BLUNT TIP SYSTEM

## (undated) DEVICE — KIT,ANTI FOG,W/SPONGE & FLUID,SOFT PACK: Brand: MEDLINE

## (undated) DEVICE — PUMP SUC IRR TBNG L10FT W/ HNDPC ASSEMB STRYKEFLOW 2

## (undated) DEVICE — SET EXTN PRIMING 4.9ML L30IN INCL SLDE CLMP SPIN M LUERLOCK

## (undated) DEVICE — ELECTRODE LAP L36CM PTFE WIRE J HK CLEANCOAT

## (undated) DEVICE — SYRINGE MED 10ML LUERLOCK TIP W/O SFTY DISP

## (undated) DEVICE — NEPTUNE E-SEP SMOKE EVACUATION PENCIL, COATED, 70MM BLADE, PUSH BUTTON SWITCH: Brand: NEPTUNE E-SEP

## (undated) DEVICE — CATHETER URET 4FR L70CM POLYUR WHSTL FLX TIP KINK RESIST W/

## (undated) DEVICE — SOLUTION SURG PREP 26 CC PURPREP

## (undated) DEVICE — GENERAL LAPAROSCOPIC: Brand: MEDLINE INDUSTRIES, INC.